# Patient Record
Sex: MALE | Race: WHITE | Employment: OTHER | URBAN - METROPOLITAN AREA
[De-identification: names, ages, dates, MRNs, and addresses within clinical notes are randomized per-mention and may not be internally consistent; named-entity substitution may affect disease eponyms.]

---

## 2017-01-30 LAB — INR BLD: 2.6

## 2017-01-31 ENCOUNTER — ANTI-COAG VISIT (OUTPATIENT)
Dept: CARDIOLOGY | Age: 82
End: 2017-01-31

## 2017-01-31 DIAGNOSIS — I48.92 ATRIAL FLUTTER, UNSPECIFIED TYPE (HCC): ICD-10-CM

## 2017-01-31 DIAGNOSIS — I48.0 PAROXYSMAL ATRIAL FIBRILLATION (HCC): ICD-10-CM

## 2017-02-08 DIAGNOSIS — I48.20 CHRONIC ATRIAL FIBRILLATION (HCC): ICD-10-CM

## 2017-02-08 RX ORDER — WARFARIN SODIUM 2 MG/1
TABLET ORAL
Qty: 90 TABLET | Refills: 3 | Status: SHIPPED | OUTPATIENT
Start: 2017-02-08 | End: 2018-07-11 | Stop reason: SDUPTHER

## 2017-02-08 RX ORDER — DILTIAZEM HYDROCHLORIDE 300 MG/1
CAPSULE, EXTENDED RELEASE ORAL
Qty: 90 CAPSULE | Refills: 3 | Status: SHIPPED | OUTPATIENT
Start: 2017-02-08 | End: 2018-02-13 | Stop reason: SDUPTHER

## 2017-03-02 LAB — INR BLD: 2.3

## 2017-03-03 ENCOUNTER — ANTI-COAG VISIT (OUTPATIENT)
Dept: CARDIOLOGY | Age: 82
End: 2017-03-03

## 2017-03-03 DIAGNOSIS — I48.92 ATRIAL FLUTTER, UNSPECIFIED TYPE (HCC): ICD-10-CM

## 2017-03-03 DIAGNOSIS — I48.0 PAROXYSMAL ATRIAL FIBRILLATION (HCC): ICD-10-CM

## 2017-04-04 LAB — INR BLD: 2.5

## 2017-04-05 ENCOUNTER — ANTI-COAG VISIT (OUTPATIENT)
Dept: CARDIOLOGY | Age: 82
End: 2017-04-05

## 2017-04-05 DIAGNOSIS — I48.92 ATRIAL FLUTTER, UNSPECIFIED TYPE (HCC): ICD-10-CM

## 2017-04-05 DIAGNOSIS — I48.0 PAROXYSMAL ATRIAL FIBRILLATION (HCC): ICD-10-CM

## 2017-05-02 ENCOUNTER — ANTI-COAG VISIT (OUTPATIENT)
Dept: CARDIOLOGY | Age: 82
End: 2017-05-02

## 2017-05-02 DIAGNOSIS — I48.0 PAROXYSMAL ATRIAL FIBRILLATION (HCC): ICD-10-CM

## 2017-05-02 DIAGNOSIS — I48.92 ATRIAL FLUTTER, UNSPECIFIED TYPE (HCC): ICD-10-CM

## 2017-05-31 ENCOUNTER — ANTI-COAG VISIT (OUTPATIENT)
Dept: CARDIOLOGY CLINIC | Age: 82
End: 2017-05-31

## 2017-05-31 DIAGNOSIS — I48.0 PAROXYSMAL ATRIAL FIBRILLATION (HCC): ICD-10-CM

## 2017-05-31 DIAGNOSIS — I48.92 ATRIAL FLUTTER, UNSPECIFIED TYPE (HCC): ICD-10-CM

## 2017-06-14 ENCOUNTER — OFFICE VISIT (OUTPATIENT)
Dept: CARDIOLOGY CLINIC | Age: 82
End: 2017-06-14

## 2017-06-14 VITALS — WEIGHT: 204.4 LBS | DIASTOLIC BLOOD PRESSURE: 60 MMHG | SYSTOLIC BLOOD PRESSURE: 150 MMHG | HEART RATE: 62 BPM

## 2017-06-14 DIAGNOSIS — I48.0 PAROXYSMAL ATRIAL FIBRILLATION (HCC): Primary | ICD-10-CM

## 2017-06-14 PROCEDURE — 93000 ELECTROCARDIOGRAM COMPLETE: CPT | Performed by: INTERNAL MEDICINE

## 2017-06-14 PROCEDURE — 4040F PNEUMOC VAC/ADMIN/RCVD: CPT | Performed by: INTERNAL MEDICINE

## 2017-06-14 PROCEDURE — 99213 OFFICE O/P EST LOW 20 MIN: CPT | Performed by: INTERNAL MEDICINE

## 2017-06-14 PROCEDURE — 1036F TOBACCO NON-USER: CPT | Performed by: INTERNAL MEDICINE

## 2017-06-14 PROCEDURE — 1123F ACP DISCUSS/DSCN MKR DOCD: CPT | Performed by: INTERNAL MEDICINE

## 2017-06-14 PROCEDURE — G8421 BMI NOT CALCULATED: HCPCS | Performed by: INTERNAL MEDICINE

## 2017-06-14 PROCEDURE — G8428 CUR MEDS NOT DOCUMENT: HCPCS | Performed by: INTERNAL MEDICINE

## 2017-07-05 LAB
INR BLD: 2.4 (ref 0.9–1.1)
PROTHROMBIN TIME: 26.9 SECONDS (ref 11.6–14.4)

## 2017-07-06 ENCOUNTER — ANTI-COAG VISIT (OUTPATIENT)
Dept: CARDIOLOGY CLINIC | Age: 82
End: 2017-07-06

## 2017-07-06 DIAGNOSIS — I48.3 TYPICAL ATRIAL FLUTTER (HCC): ICD-10-CM

## 2017-07-06 DIAGNOSIS — I48.0 PAROXYSMAL ATRIAL FIBRILLATION (HCC): ICD-10-CM

## 2017-07-06 RX ORDER — WARFARIN SODIUM 5 MG/1
TABLET ORAL
Qty: 90 TABLET | Refills: 2 | Status: SHIPPED | OUTPATIENT
Start: 2017-07-06 | End: 2018-04-24 | Stop reason: SDUPTHER

## 2017-08-01 LAB
INR BLD: 2.1 (ref 0.9–1.1)
PROTHROMBIN TIME: 24.4 SECONDS (ref 11.6–14.4)

## 2017-08-02 ENCOUNTER — ANTI-COAG VISIT (OUTPATIENT)
Dept: CARDIOLOGY CLINIC | Age: 82
End: 2017-08-02

## 2017-08-02 DIAGNOSIS — I48.0 PAROXYSMAL ATRIAL FIBRILLATION (HCC): ICD-10-CM

## 2017-08-02 DIAGNOSIS — I48.3 TYPICAL ATRIAL FLUTTER (HCC): ICD-10-CM

## 2017-08-31 ENCOUNTER — ANTI-COAG VISIT (OUTPATIENT)
Dept: CARDIOLOGY CLINIC | Age: 82
End: 2017-08-31

## 2017-08-31 DIAGNOSIS — I48.3 TYPICAL ATRIAL FLUTTER (HCC): ICD-10-CM

## 2017-08-31 DIAGNOSIS — I48.0 PAROXYSMAL ATRIAL FIBRILLATION (HCC): ICD-10-CM

## 2017-10-04 ENCOUNTER — ANTI-COAG VISIT (OUTPATIENT)
Dept: CARDIOLOGY CLINIC | Age: 82
End: 2017-10-04

## 2017-10-04 DIAGNOSIS — I48.3 TYPICAL ATRIAL FLUTTER (HCC): ICD-10-CM

## 2017-10-04 DIAGNOSIS — I48.0 PAROXYSMAL ATRIAL FIBRILLATION (HCC): ICD-10-CM

## 2017-10-04 LAB — INR BLD: 2.1

## 2017-10-04 NOTE — PATIENT INSTRUCTIONS
Old Dose: take 6 mg monday. Take 5 mg all other days.   New Dose: no change  Re-check in 1 month    Spoke to pt, verbalized understanding

## 2017-11-02 ENCOUNTER — ANTI-COAG VISIT (OUTPATIENT)
Dept: CARDIOLOGY CLINIC | Age: 82
End: 2017-11-02

## 2017-11-02 DIAGNOSIS — I48.3 TYPICAL ATRIAL FLUTTER (HCC): ICD-10-CM

## 2017-11-02 DIAGNOSIS — I48.0 PAROXYSMAL ATRIAL FIBRILLATION (HCC): ICD-10-CM

## 2017-11-02 NOTE — PATIENT INSTRUCTIONS
Old Dose: take 6 mg monday. Take 5 mg all other days.   New Dose: 6 mg   Re-check in 1 week    Left message for pt to call back

## 2017-11-10 ENCOUNTER — ANTI-COAG VISIT (OUTPATIENT)
Dept: CARDIOLOGY CLINIC | Age: 82
End: 2017-11-10

## 2017-11-10 DIAGNOSIS — I48.3 TYPICAL ATRIAL FLUTTER (HCC): ICD-10-CM

## 2017-11-10 DIAGNOSIS — I48.0 PAROXYSMAL ATRIAL FIBRILLATION (HCC): ICD-10-CM

## 2017-11-10 NOTE — PATIENT INSTRUCTIONS
Old Dose:  take 6 mg Monday and thursday 5 mg other days  New Dose:no change   Re-check in 2 weeks  attempted to call pt x3 No answer

## 2017-12-05 DIAGNOSIS — Z79.01 ANTICOAGULATED: Primary | ICD-10-CM

## 2017-12-07 ENCOUNTER — TELEPHONE (OUTPATIENT)
Dept: CARDIOLOGY CLINIC | Age: 82
End: 2017-12-07

## 2017-12-07 ENCOUNTER — ANTI-COAG VISIT (OUTPATIENT)
Dept: CARDIOLOGY CLINIC | Age: 82
End: 2017-12-07

## 2017-12-07 DIAGNOSIS — I48.0 PAROXYSMAL ATRIAL FIBRILLATION (HCC): ICD-10-CM

## 2017-12-07 DIAGNOSIS — I48.3 TYPICAL ATRIAL FLUTTER (HCC): ICD-10-CM

## 2017-12-07 LAB — INR BLD: 2.5

## 2017-12-07 NOTE — TELEPHONE ENCOUNTER
Pt called asking why he has not received a call for his INR. Informed pt three calls where made by this nurse on 11/10/17 and there was no answer. Pt then stated he had his INR drawn on 12/5/17. Informed pt there is not any results in epic. Pt stated that he went to [facility] lab. This nurse asked pt to call the lab and ask for the results to be refaxed and also offered to call the lab for him. Pt stated that he will go down to the lab office and have someone call us. Spoke to Omar Mejia at [facility] lab and asked her to re fax lab results. received fax from [facility] lab and spoke to pt about INR level. Pt stated that the lab did not have his information in the system. Informed pt to call the office in 2 days after blood test if he has not herd from us. Pt verbalized understanding and satisfied.

## 2018-01-04 ENCOUNTER — ANTI-COAG VISIT (OUTPATIENT)
Dept: CARDIOLOGY CLINIC | Age: 83
End: 2018-01-04

## 2018-01-04 DIAGNOSIS — I48.0 PAROXYSMAL ATRIAL FIBRILLATION (HCC): ICD-10-CM

## 2018-01-04 DIAGNOSIS — I48.3 TYPICAL ATRIAL FLUTTER (HCC): ICD-10-CM

## 2018-01-04 LAB — INR BLD: 2.2

## 2018-01-04 NOTE — PATIENT INSTRUCTIONS
Old Dose:  take 6 mg Monday, 5 mg other days  New Dose:no change   Re-check in 1 month  Spoke to pt he verbalized understanding

## 2018-01-10 ENCOUNTER — OFFICE VISIT (OUTPATIENT)
Dept: CARDIOLOGY CLINIC | Age: 83
End: 2018-01-10

## 2018-01-10 VITALS — SYSTOLIC BLOOD PRESSURE: 148 MMHG | HEART RATE: 86 BPM | DIASTOLIC BLOOD PRESSURE: 90 MMHG | WEIGHT: 205.2 LBS

## 2018-01-10 DIAGNOSIS — I10 ESSENTIAL HYPERTENSION, BENIGN: ICD-10-CM

## 2018-01-10 DIAGNOSIS — I48.0 PAROXYSMAL ATRIAL FIBRILLATION (HCC): Primary | ICD-10-CM

## 2018-01-10 PROCEDURE — 4040F PNEUMOC VAC/ADMIN/RCVD: CPT | Performed by: NURSE PRACTITIONER

## 2018-01-10 PROCEDURE — 99213 OFFICE O/P EST LOW 20 MIN: CPT | Performed by: NURSE PRACTITIONER

## 2018-01-10 PROCEDURE — G8421 BMI NOT CALCULATED: HCPCS | Performed by: NURSE PRACTITIONER

## 2018-01-10 PROCEDURE — 1123F ACP DISCUSS/DSCN MKR DOCD: CPT | Performed by: NURSE PRACTITIONER

## 2018-01-10 PROCEDURE — 1036F TOBACCO NON-USER: CPT | Performed by: NURSE PRACTITIONER

## 2018-01-10 PROCEDURE — G8427 DOCREV CUR MEDS BY ELIG CLIN: HCPCS | Performed by: NURSE PRACTITIONER

## 2018-01-10 PROCEDURE — G8484 FLU IMMUNIZE NO ADMIN: HCPCS | Performed by: NURSE PRACTITIONER

## 2018-01-10 ASSESSMENT — ENCOUNTER SYMPTOMS
RESPIRATORY NEGATIVE: 1
GASTROINTESTINAL NEGATIVE: 1

## 2018-01-31 ENCOUNTER — ANTI-COAG VISIT (OUTPATIENT)
Dept: CARDIOLOGY CLINIC | Age: 83
End: 2018-01-31

## 2018-01-31 DIAGNOSIS — I48.3 TYPICAL ATRIAL FLUTTER (HCC): ICD-10-CM

## 2018-01-31 DIAGNOSIS — I48.0 PAROXYSMAL ATRIAL FIBRILLATION (HCC): ICD-10-CM

## 2018-01-31 LAB — INR BLD: 2.3

## 2018-01-31 NOTE — PATIENT INSTRUCTIONS
Old Dose:  take 7 mg Monday, 5 mg other days  New Dose:no change   Re-check in 1 month  Spoke to pt he verbalized understanding

## 2018-02-13 DIAGNOSIS — I48.20 CHRONIC ATRIAL FIBRILLATION (HCC): ICD-10-CM

## 2018-02-13 RX ORDER — DILTIAZEM HYDROCHLORIDE 300 MG/1
CAPSULE, EXTENDED RELEASE ORAL
Qty: 90 CAPSULE | Refills: 3 | Status: SHIPPED | OUTPATIENT
Start: 2018-02-13 | End: 2018-10-30 | Stop reason: SDUPTHER

## 2018-03-06 LAB — INR BLD: 2.3

## 2018-03-07 ENCOUNTER — ANTI-COAG VISIT (OUTPATIENT)
Dept: CARDIOLOGY CLINIC | Age: 83
End: 2018-03-07

## 2018-03-07 DIAGNOSIS — I48.3 TYPICAL ATRIAL FLUTTER (HCC): ICD-10-CM

## 2018-03-07 DIAGNOSIS — I48.0 PAROXYSMAL ATRIAL FIBRILLATION (HCC): ICD-10-CM

## 2018-03-07 NOTE — PATIENT INSTRUCTIONS
Old Dose:  take 7 mg Monday, 5 mg other days  New Dose: No change   Re-check in 1 month  Spoke to pt he verbalized understanding

## 2018-04-25 RX ORDER — WARFARIN SODIUM 5 MG/1
TABLET ORAL
Qty: 90 TABLET | Refills: 1 | Status: SHIPPED | OUTPATIENT
Start: 2018-04-25 | End: 2018-07-11 | Stop reason: SDUPTHER

## 2018-05-01 LAB — INR BLD: 2.6

## 2018-05-02 ENCOUNTER — ANTI-COAG VISIT (OUTPATIENT)
Dept: CARDIOLOGY CLINIC | Age: 83
End: 2018-05-02

## 2018-05-02 DIAGNOSIS — I48.0 PAROXYSMAL ATRIAL FIBRILLATION (HCC): ICD-10-CM

## 2018-05-02 DIAGNOSIS — I48.3 TYPICAL ATRIAL FLUTTER (HCC): ICD-10-CM

## 2018-06-06 ENCOUNTER — ANTI-COAG VISIT (OUTPATIENT)
Dept: CARDIOLOGY CLINIC | Age: 83
End: 2018-06-06

## 2018-06-06 DIAGNOSIS — I48.3 TYPICAL ATRIAL FLUTTER (HCC): ICD-10-CM

## 2018-06-06 DIAGNOSIS — I48.0 PAROXYSMAL ATRIAL FIBRILLATION (HCC): ICD-10-CM

## 2018-06-06 LAB — INR BLD: 2.5

## 2018-07-11 ENCOUNTER — OFFICE VISIT (OUTPATIENT)
Dept: CARDIOLOGY CLINIC | Age: 83
End: 2018-07-11

## 2018-07-11 VITALS — WEIGHT: 208 LBS | DIASTOLIC BLOOD PRESSURE: 70 MMHG | SYSTOLIC BLOOD PRESSURE: 124 MMHG | HEART RATE: 64 BPM

## 2018-07-11 DIAGNOSIS — I10 ESSENTIAL HYPERTENSION, BENIGN: Primary | ICD-10-CM

## 2018-07-11 DIAGNOSIS — I48.0 PAROXYSMAL ATRIAL FIBRILLATION (HCC): ICD-10-CM

## 2018-07-11 PROCEDURE — 99213 OFFICE O/P EST LOW 20 MIN: CPT | Performed by: INTERNAL MEDICINE

## 2018-07-11 PROCEDURE — 93000 ELECTROCARDIOGRAM COMPLETE: CPT | Performed by: INTERNAL MEDICINE

## 2018-07-11 RX ORDER — WARFARIN SODIUM 2 MG/1
TABLET ORAL
Qty: 12 TABLET | Refills: 3 | Status: SHIPPED | OUTPATIENT
Start: 2018-07-11 | End: 2018-07-12 | Stop reason: SDUPTHER

## 2018-07-11 RX ORDER — WARFARIN SODIUM 5 MG/1
5 TABLET ORAL DAILY
Qty: 90 TABLET | Refills: 3 | Status: SHIPPED | OUTPATIENT
Start: 2018-07-11 | End: 2019-02-04 | Stop reason: SDUPTHER

## 2018-07-11 NOTE — PROGRESS NOTES
Subjective:      Patient ID: Kahlil Rosas is a 80 y.o. male. CC:  Follow up paroxysmal AF    HPI Mr. Laura Chapa is here for a 4 month review with history of AF. No CP. No near syncope. No palps and no chest pains. hasnt had a headache in \"years\". No Known Allergies     Social History     Social History    Marital status:      Spouse name: N/A    Number of children: N/A    Years of education: N/A     Occupational History    Not on file. Social History Main Topics    Smoking status: Never Smoker    Smokeless tobacco: Never Used    Alcohol use No    Drug use: No    Sexual activity: Not on file      Comment:  living with spouse     Other Topics Concern    Not on file     Social History Narrative    No narrative on file        Patient has a family history is not on file. Patient  has a past medical history of Hypertension and PAF (paroxysmal atrial fibrillation) ([facility] [address] Current Outpatient Prescriptions   Medication Sig Dispense Refill    warfarin (COUMADIN) 5 MG tablet TAKE ONE TABLET BY MOUTH DAILY FOR SIX DAYS PER WEEK AND 4MG ONE DAY PER WEEK OR AS DIRECTED BY THE PHYSICIAN 90 tablet 1    CARTIA  MG extended release capsule TAKE ONE CAPSULE BY MOUTH DAILY 90 capsule 3    warfarin (COUMADIN) 2 MG tablet Three tablets once day per week 90 tablet 3     No current facility-administered medications for this visit. Vitals  Weight: 208 lb (94.3 kg)  Blood Pressure:  164/98  160/98   Pulse: 64       Review of Systems   Constitutional: Negative. HENT: Negative. Eyes: Negative. Respiratory: Negative. Cardiovascular: Negative. Gastrointestinal: Negative. Genitourinary: Negative. Musculoskeletal: Negative. Neurological: Negative. Hematological: Negative. Psychiatric/Behavioral: Negative. Objective:   Physical Exam   Nursing note and vitals reviewed. Constitutional: He is oriented to person, place, and time.  He appears well-developed and well-nourished. No distress. HENT:   Head: Normocephalic and atraumatic. Nose: Nose normal.   Mouth/Throat: Oropharynx is clear and moist. No oropharyngeal exudate. Eyes: Conjunctivae and EOM are normal. Pupils are equal, round, and reactive to light. Right eye exhibits no discharge. Left eye exhibits no discharge. No scleral icterus. Neck: Normal range of motion. Neck supple. No JVD present. No tracheal deviation present. No thyromegaly present. Cardiovascular: Normal rate, regular rhythm, normal heart sounds and intact distal pulses. Exam reveals no gallop and no friction rub. II/VI HSM and ESM   No murmur heard. Pulmonary/Chest: Effort normal. No respiratory distress. He has no wheezes. He has no rales. He exhibits no tenderness. Abdominal: Soft. Bowel sounds are normal. He exhibits no distension and no mass. No tenderness. He has no rebound and no guarding. Musculoskeletal: He exhibits no edema and no tenderness. Lymphadenopathy:     He has no cervical adenopathy. Neurological: He is alert and oriented to person, place, and time. No cranial nerve deficit. Coordination normal.   Skin: Skin is warm and dry. No rash noted. He is not diaphoretic. No erythema. Psychiatric: He has a normal mood and affect. His behavior is normal. Judgment and thought content normal.       Assessment:      Patient Active Problem List   Diagnosis    Atrial fibrillation (HCC)    Atrial flutter (HCC)    Essential hypertension, benign           Plan:      Continue Cartia to 300 mg daily. AF is controlled and is on warfarin. Continue warfarin. He is SR today on ECG with RBBB and unchanged with 1 pVC. Luke Terrell

## 2018-07-12 DIAGNOSIS — I48.0 PAROXYSMAL ATRIAL FIBRILLATION (HCC): Primary | ICD-10-CM

## 2018-07-12 RX ORDER — WARFARIN SODIUM 2 MG/1
TABLET ORAL
Qty: 30 TABLET | Refills: 3 | Status: SHIPPED | OUTPATIENT
Start: 2018-07-12 | End: 2019-02-04 | Stop reason: SDUPTHER

## 2018-08-02 ENCOUNTER — TELEPHONE (OUTPATIENT)
Dept: CARDIOLOGY CLINIC | Age: 83
End: 2018-08-02

## 2018-08-02 NOTE — TELEPHONE ENCOUNTER
is upset that he has to call us to get his protime. I explained to him that we can only call him once we receive his Pro time from . He stated that for two months now it has been a delay in him getting his results. Please look into this for .

## 2018-09-05 DIAGNOSIS — I48.0 PAROXYSMAL ATRIAL FIBRILLATION (HCC): Primary | ICD-10-CM

## 2018-09-05 DIAGNOSIS — I48.3 TYPICAL ATRIAL FLUTTER (HCC): ICD-10-CM

## 2018-09-05 LAB
INR BLD: 2.36
INR BLD: 2.36 (ref 0.86–1.14)
PROTHROMBIN TIME: 26.9 SEC (ref 9.8–13)

## 2018-09-06 ENCOUNTER — ANTI-COAG VISIT (OUTPATIENT)
Dept: CARDIOLOGY CLINIC | Age: 83
End: 2018-09-06

## 2018-09-06 DIAGNOSIS — I48.3 TYPICAL ATRIAL FLUTTER (HCC): ICD-10-CM

## 2018-09-06 DIAGNOSIS — I48.0 PAROXYSMAL ATRIAL FIBRILLATION (HCC): ICD-10-CM

## 2018-10-02 DIAGNOSIS — Z79.01 ANTICOAGULATED: ICD-10-CM

## 2018-10-02 LAB
INR BLD: 2.4 (ref 0.86–1.14)
PROTHROMBIN TIME: 27.4 SEC (ref 9.8–13)

## 2018-10-03 ENCOUNTER — ANTI-COAG VISIT (OUTPATIENT)
Dept: CARDIOLOGY CLINIC | Age: 83
End: 2018-10-03
Payer: MEDICARE

## 2018-10-03 DIAGNOSIS — I48.92 ATRIAL FLUTTER, UNSPECIFIED TYPE (HCC): ICD-10-CM

## 2018-10-03 DIAGNOSIS — I48.91 ATRIAL FIBRILLATION, UNSPECIFIED TYPE (HCC): ICD-10-CM

## 2018-10-03 PROCEDURE — 93793 ANTICOAG MGMT PT WARFARIN: CPT | Performed by: NURSE PRACTITIONER

## 2018-10-24 ENCOUNTER — TELEPHONE (OUTPATIENT)
Dept: CARDIOLOGY CLINIC | Age: 83
End: 2018-10-24

## 2018-10-25 ENCOUNTER — TELEPHONE (OUTPATIENT)
Dept: PHARMACY | Age: 83
End: 2018-10-25

## 2018-10-25 ENCOUNTER — TELEPHONE (OUTPATIENT)
Dept: CARDIOLOGY CLINIC | Age: 83
End: 2018-10-25

## 2018-10-25 DIAGNOSIS — I48.91 ATRIAL FIBRILLATION, UNSPECIFIED TYPE (HCC): Primary | ICD-10-CM

## 2018-10-25 NOTE — TELEPHONE ENCOUNTER
Received e-referral from Dr. Fouzia Carrasquillo to manage warfarin therapy. Attempted to call patient's daughter To Donovan) in order to schedule first appointment. Left a voicemail with instructions for Valentina to contact the clinic in order to schedule an appointment. Note: patient was recently diagnosed with left central retinal artery occlusion. Lipitor and ASA 81 mg daily were initiated for stroke prevention. Patient was also prescribed prednisone, which will likely elevate INR. Patient has lost vision in his left eye, so Laura Chavez has been helping with medical care.       Adonis Denny, PharmD  [facility]   [facility] Ph: [phone]  [facility] Ph: [phone]  10/25/2018 1:22 PM

## 2018-10-30 ENCOUNTER — OFFICE VISIT (OUTPATIENT)
Dept: CARDIOLOGY CLINIC | Age: 83
End: 2018-10-30
Payer: MEDICARE

## 2018-10-30 ENCOUNTER — ANTI-COAG VISIT (OUTPATIENT)
Dept: CARDIOLOGY CLINIC | Age: 83
End: 2018-10-30
Payer: MEDICARE

## 2018-10-30 VITALS
OXYGEN SATURATION: 96 % | BODY MASS INDEX: 28.78 KG/M2 | DIASTOLIC BLOOD PRESSURE: 70 MMHG | HEART RATE: 78 BPM | SYSTOLIC BLOOD PRESSURE: 110 MMHG | HEIGHT: 71 IN | WEIGHT: 205.6 LBS

## 2018-10-30 DIAGNOSIS — I48.0 PAROXYSMAL ATRIAL FIBRILLATION (HCC): ICD-10-CM

## 2018-10-30 DIAGNOSIS — I48.92 ATRIAL FLUTTER, UNSPECIFIED TYPE (HCC): ICD-10-CM

## 2018-10-30 DIAGNOSIS — I48.91 ATRIAL FIBRILLATION, UNSPECIFIED TYPE (HCC): ICD-10-CM

## 2018-10-30 DIAGNOSIS — I48.20 CHRONIC ATRIAL FIBRILLATION (HCC): ICD-10-CM

## 2018-10-30 DIAGNOSIS — I48.3 TYPICAL ATRIAL FLUTTER (HCC): ICD-10-CM

## 2018-10-30 DIAGNOSIS — I48.91 ATRIAL FIBRILLATION, UNSPECIFIED TYPE (HCC): Primary | ICD-10-CM

## 2018-10-30 LAB
INR BLD: 2.75 (ref 0.86–1.14)
PROTHROMBIN TIME: 31.3 SEC (ref 9.8–13)

## 2018-10-30 PROCEDURE — 93793 ANTICOAG MGMT PT WARFARIN: CPT | Performed by: NURSE PRACTITIONER

## 2018-10-30 PROCEDURE — 99214 OFFICE O/P EST MOD 30 MIN: CPT | Performed by: INTERNAL MEDICINE

## 2018-10-30 PROCEDURE — 93000 ELECTROCARDIOGRAM COMPLETE: CPT | Performed by: INTERNAL MEDICINE

## 2018-10-30 RX ORDER — PREDNISONE 20 MG/1
20 TABLET ORAL DAILY
COMMUNITY
End: 2018-12-26 | Stop reason: CLARIF

## 2018-10-30 RX ORDER — DILTIAZEM HYDROCHLORIDE 300 MG/1
CAPSULE, COATED, EXTENDED RELEASE ORAL
Qty: 90 CAPSULE | Refills: 3 | Status: SHIPPED | OUTPATIENT
Start: 2018-10-30 | End: 2019-11-17 | Stop reason: SDUPTHER

## 2018-10-30 RX ORDER — ATORVASTATIN CALCIUM 80 MG/1
80 TABLET, FILM COATED ORAL DAILY
COMMUNITY
End: 2018-11-16 | Stop reason: SDUPTHER

## 2018-10-30 NOTE — TELEPHONE ENCOUNTER
Spoke to patient- he feels that [place] is too far away and he relies on his daughter for transportation. He will discuss plans for INR monitoring with cardiologist today at [facility].     Popeye Trujillo, PharmD, [facility]  Medication Management Clinic   [facility] Ph: [phone]  Rosanna Gerardo Ph: [phone]  10/30/2018 9:46 AM

## 2018-11-02 ENCOUNTER — TELEPHONE (OUTPATIENT)
Dept: CARDIOLOGY CLINIC | Age: 83
End: 2018-11-02

## 2018-11-08 ENCOUNTER — ANTI-COAG VISIT (OUTPATIENT)
Dept: PHARMACY | Age: 83
End: 2018-11-08
Payer: MEDICARE

## 2018-11-08 DIAGNOSIS — I48.91 ATRIAL FIBRILLATION, UNSPECIFIED TYPE (HCC): ICD-10-CM

## 2018-11-08 DIAGNOSIS — I48.92 ATRIAL FLUTTER, UNSPECIFIED TYPE (HCC): ICD-10-CM

## 2018-11-08 LAB — INTERNATIONAL NORMALIZATION RATIO, POC: 3.5

## 2018-11-08 PROCEDURE — 99213 OFFICE O/P EST LOW 20 MIN: CPT

## 2018-11-08 PROCEDURE — 85610 PROTHROMBIN TIME: CPT

## 2018-11-08 NOTE — PROGRESS NOTES
ANTICOAGULATION SERVICE    Ismael Billingsley is a 80 y.o. male with PMHx significant for A-fib/A-flutter, HTN, recent left central retinal artery occlusion who presents to clinic 11/8/2018 for anticoagulation monitoring and adjustment. Patient has been taking warfarin for a couple of years.     Anticoagulation Indication(s):  Afib, Aflutter    Referring Physician:  Dr. Bea Mosley  Goal INR Range:  2-3  Duration of Anticoagulation Therapy:  Indefinite  Time of day dose taken:  PM  Product patient has at home:  warfarin 5 mg (peach), 2 mg (lavender)    Recent INR Results:  Lab Results   Component Value Date    INR 2.75 (H) 10/30/2018    INR 2.40 (H) 10/02/2018    INR 2.36 (H) 09/05/2018    INR 2.36 09/05/2018       INR Summary                             Warfarin regimen (mg)  Date INR   A/P     Sun Mon Tue Wed Thu Fri Sat Mg/wk  11/8 3.5 Above goal, decrease   5 5 5 5 2.5/5 5 5 35  10/30 2.75 Per cardio office, continue  5 7 5 5 5 5 5 37    Last CBC:  Lab Results   Component Value Date    RBC 5.16 02/12/2014    HGB 16.2 02/12/2014    HCT 47.7 02/12/2014    MCV 92.5 02/12/2014    MCH 31.5 02/12/2014    MPV 10.4 02/12/2014    RDW 14.0 02/12/2014     02/12/2014       Patient History:  Recent hospitalizations/HC visits -11/8 rheumatologist: patient needs temporal artery biopsy to r/o temporal arteritis   -10/30 Dr. Bea Mosley: no med changes  -10/19 [facility] AT [facility] ED: diagnosed with left central retinal artery occlusion   Recent medication changes -Prednisone 60 mg daily started 10/23  -Started on ASA + Lipitor for central retinal artery occlusion on 10/19   Medications taken regularly that may interact with warfarin or alter INR ASA 81 mg   Warfarin dose taken as prescribed Yes, does not use pillbox, but has own system of taking medications that works very well   Signs/symptoms of bleeding No h/o bleeding reported   Vitamin K intake Normally has ~0 servings of green, leafy vegetables per week   Recent vomiting/diarrhea/fever,

## 2018-11-16 ENCOUNTER — TELEPHONE (OUTPATIENT)
Dept: CARDIOLOGY CLINIC | Age: 83
End: 2018-11-16

## 2018-11-16 RX ORDER — ATORVASTATIN CALCIUM 80 MG/1
80 TABLET, FILM COATED ORAL DAILY
Qty: 30 TABLET | Refills: 5 | Status: ON HOLD | OUTPATIENT
Start: 2018-11-16 | End: 2019-02-24 | Stop reason: CLARIF

## 2018-11-19 ENCOUNTER — ANTI-COAG VISIT (OUTPATIENT)
Dept: PHARMACY | Age: 83
End: 2018-11-19
Payer: MEDICARE

## 2018-11-19 DIAGNOSIS — I48.91 ATRIAL FIBRILLATION, UNSPECIFIED TYPE (HCC): ICD-10-CM

## 2018-11-19 DIAGNOSIS — I48.92 ATRIAL FLUTTER, UNSPECIFIED TYPE (HCC): ICD-10-CM

## 2018-11-19 LAB — INTERNATIONAL NORMALIZATION RATIO, POC: 2.7

## 2018-11-19 PROCEDURE — 85610 PROTHROMBIN TIME: CPT

## 2018-11-19 PROCEDURE — 99211 OFF/OP EST MAY X REQ PHY/QHP: CPT

## 2018-11-19 NOTE — PROGRESS NOTES
ANTICOAGULATION SERVICE    Shellie Valentino is a 80 y.o. male with PMHx significant for A-fib/A-flutter, HTN, recent left central retinal artery occlusion who presents to clinic 11/19/2018 for anticoagulation monitoring and adjustment. Patient has been taking warfarin for a couple of years.     Anticoagulation Indication(s):  Afib, Aflutter    Referring Physician:  Dr. Adan Fields  Goal INR Range:  2-3  Duration of Anticoagulation Therapy:  Indefinite  Time of day dose taken:  PM  Product patient has at home:  warfarin 5 mg (peach), 2 mg (lavender)    Recent INR Results:  Lab Results   Component Value Date    INR 3.5 11/08/2018    INR 2.75 (H) 10/30/2018    INR 2.40 (H) 10/02/2018    INR 2.36 (H) 09/05/2018       INR Summary                             Warfarin regimen (mg)  Date INR   A/P     Sun Mon Tue Wed Thu Fri Sat Mg/wk  11/19 2.7 At goal, no change   5 5 5 5 5 5 5 35  11/8 3.5 Above goal, decrease   5 5 5 5 2.5/5 5 5 35  10/30 2.75 Per cardio office, continue  5 7 5 5 5 5 5 37    Last CBC:  Lab Results   Component Value Date    RBC 5.16 02/12/2014    HGB 16.2 02/12/2014    HCT 47.7 02/12/2014    MCV 92.5 02/12/2014    MCH 31.5 02/12/2014    MPV 10.4 02/12/2014    RDW 14.0 02/12/2014     02/12/2014       Patient History:  Recent hospitalizations/HC visits -11/8 rheumatologist: patient needs temporal artery biopsy to r/o temporal arteritis   -10/30 Dr. Adan Fields: no med changes  -10/19 [facility] AT [facility] ED: diagnosed with left central retinal artery occlusion   Recent medication changes -Prednisone 60 mg daily started 10/23  -Started on ASA + Lipitor for central retinal artery occlusion on 10/19   Medications taken regularly that may interact with warfarin or alter INR ASA 81 mg   Warfarin dose taken as prescribed Yes, does not use pillbox, but has own system of taking medications that works very well   Signs/symptoms of bleeding No h/o bleeding reported   Vitamin K intake Normally has ~0 servings of green, leafy vegetables per week   Recent vomiting/diarrhea/fever, changes in weight or activity level None reported   Tobacco or alcohol use Patient quit smoking in 1955  Patient quit drinking alcohol at least 5 years ago   Upcoming surgeries or procedures Temporal artery biopsy 11/26 at [facility]: patient will hold warfarin 11/21-11/26 and resume on 11/27     Assessment/Plan:  Patient's INR was therapeutic today (2.7) following warfarin dose reduction at last visit. Patient was instructed to continue warfarin 5 mg daily, then hold 11/21-11/26 for temporal artery biopsy on 11/26. Repeat INR 1 week after procedure. Patient was reminded to maintain consistent vitamin K intake and call with any bleeding, medication changes, or fever/vomiting/diarrhea. Patient understands dosing directions and information discussed. Dosing schedule and follow up appointment given to patient. Progress note routed to referring physician's office. Patient acknowledges working in consult agreement with pharmacist as referred by his/her physician. Next Clinic Appointment:  12/6    Please call [facility] at [phone] with any questions. Thanks! Flor Toure.  Ag Bolton, PharmD  [facility]  Ph: [phone]  11/19/2018 9:53 AM

## 2018-12-06 ENCOUNTER — ANTI-COAG VISIT (OUTPATIENT)
Dept: PHARMACY | Age: 83
End: 2018-12-06
Payer: MEDICARE

## 2018-12-06 DIAGNOSIS — I48.92 ATRIAL FLUTTER, UNSPECIFIED TYPE (HCC): ICD-10-CM

## 2018-12-06 DIAGNOSIS — I48.91 ATRIAL FIBRILLATION, UNSPECIFIED TYPE (HCC): ICD-10-CM

## 2018-12-06 LAB — INTERNATIONAL NORMALIZATION RATIO, POC: 2.8

## 2018-12-06 PROCEDURE — 99211 OFF/OP EST MAY X REQ PHY/QHP: CPT

## 2018-12-06 PROCEDURE — 85610 PROTHROMBIN TIME: CPT

## 2018-12-07 ENCOUNTER — TELEPHONE (OUTPATIENT)
Dept: CARDIOLOGY CLINIC | Age: 83
End: 2018-12-07

## 2018-12-21 ENCOUNTER — ANTI-COAG VISIT (OUTPATIENT)
Dept: PHARMACY | Age: 83
End: 2018-12-21
Payer: MEDICARE

## 2018-12-21 DIAGNOSIS — I48.91 ATRIAL FIBRILLATION, UNSPECIFIED TYPE (HCC): ICD-10-CM

## 2018-12-21 DIAGNOSIS — I48.92 ATRIAL FLUTTER, UNSPECIFIED TYPE (HCC): ICD-10-CM

## 2018-12-21 LAB — INTERNATIONAL NORMALIZATION RATIO, POC: 3.6

## 2018-12-21 PROCEDURE — 99211 OFF/OP EST MAY X REQ PHY/QHP: CPT

## 2018-12-21 PROCEDURE — 85610 PROTHROMBIN TIME: CPT

## 2018-12-26 ENCOUNTER — TELEPHONE (OUTPATIENT)
Dept: CARDIOLOGY CLINIC | Age: 83
End: 2018-12-26

## 2018-12-26 ENCOUNTER — HOSPITAL ENCOUNTER (INPATIENT)
Age: 83
LOS: 3 days | Discharge: HOME HEALTH | DRG: 292 | End: 2018-12-29
Attending: EMERGENCY MEDICINE | Admitting: INTERNAL MEDICINE
Payer: MEDICARE

## 2018-12-26 ENCOUNTER — APPOINTMENT (OUTPATIENT)
Dept: GENERAL RADIOLOGY | Age: 83
DRG: 292 | End: 2018-12-26
Payer: MEDICARE

## 2018-12-26 DIAGNOSIS — L03.116 CELLULITIS OF LEFT LOWER EXTREMITY: ICD-10-CM

## 2018-12-26 DIAGNOSIS — I50.9 ACUTE CONGESTIVE HEART FAILURE, UNSPECIFIED HEART FAILURE TYPE (HCC): Primary | ICD-10-CM

## 2018-12-26 LAB
ANION GAP SERPL CALCULATED.3IONS-SCNC: 12 MMOL/L (ref 3–16)
BASOPHILS ABSOLUTE: 0 K/UL (ref 0–0.2)
BASOPHILS RELATIVE PERCENT: 0.5 %
BUN BLDV-MCNC: 10 MG/DL (ref 7–20)
CALCIUM SERPL-MCNC: 8.5 MG/DL (ref 8.3–10.6)
CHLORIDE BLD-SCNC: 99 MMOL/L (ref 99–110)
CO2: 29 MMOL/L (ref 21–32)
CREAT SERPL-MCNC: 1.3 MG/DL (ref 0.8–1.3)
EKG ATRIAL RATE: 73 BPM
EKG DIAGNOSIS: NORMAL
EKG Q-T INTERVAL: 408 MS
EKG QRS DURATION: 128 MS
EKG QTC CALCULATION (BAZETT): 479 MS
EKG R AXIS: 18 DEGREES
EKG T AXIS: 34 DEGREES
EKG VENTRICULAR RATE: 83 BPM
EOSINOPHILS ABSOLUTE: 0 K/UL (ref 0–0.6)
EOSINOPHILS RELATIVE PERCENT: 0.6 %
GFR AFRICAN AMERICAN: >60
GFR NON-AFRICAN AMERICAN: 52
GLUCOSE BLD-MCNC: 200 MG/DL (ref 70–99)
HCT VFR BLD CALC: 38.5 % (ref 40.5–52.5)
HEMOGLOBIN: 13.1 G/DL (ref 13.5–17.5)
INR BLD: 4.01 (ref 0.86–1.14)
LYMPHOCYTES ABSOLUTE: 1.3 K/UL (ref 1–5.1)
LYMPHOCYTES RELATIVE PERCENT: 15.9 %
MCH RBC QN AUTO: 30.9 PG (ref 26–34)
MCHC RBC AUTO-ENTMCNC: 34 G/DL (ref 31–36)
MCV RBC AUTO: 90.9 FL (ref 80–100)
MONOCYTES ABSOLUTE: 0.7 K/UL (ref 0–1.3)
MONOCYTES RELATIVE PERCENT: 9.2 %
NEUTROPHILS ABSOLUTE: 5.8 K/UL (ref 1.7–7.7)
NEUTROPHILS RELATIVE PERCENT: 73.8 %
PDW BLD-RTO: 15.1 % (ref 12.4–15.4)
PLATELET # BLD: 190 K/UL (ref 135–450)
PMV BLD AUTO: 9 FL (ref 5–10.5)
POTASSIUM SERPL-SCNC: 3.4 MMOL/L (ref 3.5–5.1)
PRO-BNP: 474 PG/ML (ref 0–449)
PROTHROMBIN TIME: 45.7 SEC (ref 9.8–13)
RBC # BLD: 4.23 M/UL (ref 4.2–5.9)
SODIUM BLD-SCNC: 140 MMOL/L (ref 136–145)
TROPONIN: 0.01 NG/ML
WBC # BLD: 7.9 K/UL (ref 4–11)

## 2018-12-26 PROCEDURE — 6370000000 HC RX 637 (ALT 250 FOR IP): Performed by: INTERNAL MEDICINE

## 2018-12-26 PROCEDURE — 2580000003 HC RX 258: Performed by: INTERNAL MEDICINE

## 2018-12-26 PROCEDURE — 36415 COLL VENOUS BLD VENIPUNCTURE: CPT

## 2018-12-26 PROCEDURE — 2580000003 HC RX 258: Performed by: EMERGENCY MEDICINE

## 2018-12-26 PROCEDURE — 84443 ASSAY THYROID STIM HORMONE: CPT

## 2018-12-26 PROCEDURE — 80048 BASIC METABOLIC PNL TOTAL CA: CPT

## 2018-12-26 PROCEDURE — 1200000000 HC SEMI PRIVATE

## 2018-12-26 PROCEDURE — 83880 ASSAY OF NATRIURETIC PEPTIDE: CPT

## 2018-12-26 PROCEDURE — 99285 EMERGENCY DEPT VISIT HI MDM: CPT

## 2018-12-26 PROCEDURE — 6360000002 HC RX W HCPCS: Performed by: EMERGENCY MEDICINE

## 2018-12-26 PROCEDURE — 2500000003 HC RX 250 WO HCPCS: Performed by: EMERGENCY MEDICINE

## 2018-12-26 PROCEDURE — 93005 ELECTROCARDIOGRAM TRACING: CPT | Performed by: EMERGENCY MEDICINE

## 2018-12-26 PROCEDURE — 87040 BLOOD CULTURE FOR BACTERIA: CPT

## 2018-12-26 PROCEDURE — 96365 THER/PROPH/DIAG IV INF INIT: CPT

## 2018-12-26 PROCEDURE — 85610 PROTHROMBIN TIME: CPT

## 2018-12-26 PROCEDURE — 6360000002 HC RX W HCPCS: Performed by: INTERNAL MEDICINE

## 2018-12-26 PROCEDURE — 96375 TX/PRO/DX INJ NEW DRUG ADDON: CPT

## 2018-12-26 PROCEDURE — 99223 1ST HOSP IP/OBS HIGH 75: CPT | Performed by: INTERNAL MEDICINE

## 2018-12-26 PROCEDURE — 85025 COMPLETE CBC W/AUTO DIFF WBC: CPT

## 2018-12-26 PROCEDURE — 71046 X-RAY EXAM CHEST 2 VIEWS: CPT

## 2018-12-26 PROCEDURE — 84484 ASSAY OF TROPONIN QUANT: CPT

## 2018-12-26 RX ORDER — SACCHAROMYCES BOULARDII 250 MG
250 CAPSULE ORAL 2 TIMES DAILY
Status: DISCONTINUED | OUTPATIENT
Start: 2018-12-26 | End: 2018-12-29 | Stop reason: HOSPADM

## 2018-12-26 RX ORDER — DILTIAZEM HYDROCHLORIDE 300 MG/1
300 CAPSULE, COATED, EXTENDED RELEASE ORAL DAILY
Status: DISCONTINUED | OUTPATIENT
Start: 2018-12-27 | End: 2018-12-29 | Stop reason: HOSPADM

## 2018-12-26 RX ORDER — CLINDAMYCIN HYDROCHLORIDE 300 MG/1
300 CAPSULE ORAL EVERY 6 HOURS
Status: DISCONTINUED | OUTPATIENT
Start: 2018-12-26 | End: 2018-12-28

## 2018-12-26 RX ORDER — ASPIRIN 81 MG/1
81 TABLET, CHEWABLE ORAL DAILY
Status: DISCONTINUED | OUTPATIENT
Start: 2018-12-26 | End: 2018-12-29 | Stop reason: HOSPADM

## 2018-12-26 RX ORDER — SODIUM CHLORIDE 0.9 % (FLUSH) 0.9 %
10 SYRINGE (ML) INJECTION EVERY 12 HOURS SCHEDULED
Status: DISCONTINUED | OUTPATIENT
Start: 2018-12-26 | End: 2018-12-29 | Stop reason: HOSPADM

## 2018-12-26 RX ORDER — POTASSIUM CHLORIDE 20 MEQ/1
20 TABLET, EXTENDED RELEASE ORAL
Status: DISCONTINUED | OUTPATIENT
Start: 2018-12-27 | End: 2018-12-27

## 2018-12-26 RX ORDER — ATORVASTATIN CALCIUM 80 MG/1
80 TABLET, FILM COATED ORAL DAILY
Status: DISCONTINUED | OUTPATIENT
Start: 2018-12-26 | End: 2018-12-29 | Stop reason: HOSPADM

## 2018-12-26 RX ORDER — POTASSIUM CHLORIDE 20 MEQ/1
40 TABLET, EXTENDED RELEASE ORAL ONCE
Status: COMPLETED | OUTPATIENT
Start: 2018-12-26 | End: 2018-12-26

## 2018-12-26 RX ORDER — ONDANSETRON 2 MG/ML
4 INJECTION INTRAMUSCULAR; INTRAVENOUS EVERY 6 HOURS PRN
Status: DISCONTINUED | OUTPATIENT
Start: 2018-12-26 | End: 2018-12-29 | Stop reason: HOSPADM

## 2018-12-26 RX ORDER — FUROSEMIDE 10 MG/ML
60 INJECTION INTRAMUSCULAR; INTRAVENOUS ONCE
Status: COMPLETED | OUTPATIENT
Start: 2018-12-26 | End: 2018-12-26

## 2018-12-26 RX ORDER — CLINDAMYCIN PHOSPHATE 600 MG/50ML
600 INJECTION INTRAVENOUS ONCE
Status: COMPLETED | OUTPATIENT
Start: 2018-12-26 | End: 2018-12-26

## 2018-12-26 RX ORDER — FUROSEMIDE 10 MG/ML
40 INJECTION INTRAMUSCULAR; INTRAVENOUS 2 TIMES DAILY
Status: DISCONTINUED | OUTPATIENT
Start: 2018-12-26 | End: 2018-12-29

## 2018-12-26 RX ORDER — SODIUM CHLORIDE 0.9 % (FLUSH) 0.9 %
10 SYRINGE (ML) INJECTION PRN
Status: DISCONTINUED | OUTPATIENT
Start: 2018-12-26 | End: 2018-12-29 | Stop reason: HOSPADM

## 2018-12-26 RX ADMIN — POTASSIUM CHLORIDE 40 MEQ: 20 TABLET, EXTENDED RELEASE ORAL at 17:22

## 2018-12-26 RX ADMIN — ASPIRIN 81 MG 81 MG: 81 TABLET ORAL at 18:47

## 2018-12-26 RX ADMIN — FUROSEMIDE 40 MG: 10 INJECTION, SOLUTION INTRAMUSCULAR; INTRAVENOUS at 18:44

## 2018-12-26 RX ADMIN — CEFAZOLIN 1 G: 1 INJECTION, POWDER, FOR SOLUTION INTRAMUSCULAR; INTRAVENOUS at 16:47

## 2018-12-26 RX ADMIN — FUROSEMIDE 60 MG: 10 INJECTION, SOLUTION INTRAMUSCULAR; INTRAVENOUS at 16:47

## 2018-12-26 RX ADMIN — CLINDAMYCIN HYDROCHLORIDE 300 MG: 300 CAPSULE ORAL at 23:28

## 2018-12-26 RX ADMIN — ATORVASTATIN CALCIUM 80 MG: 80 TABLET, FILM COATED ORAL at 18:47

## 2018-12-26 RX ADMIN — Medication 10 ML: at 20:24

## 2018-12-26 RX ADMIN — CLINDAMYCIN PHOSPHATE 600 MG: 600 INJECTION, SOLUTION INTRAVENOUS at 18:43

## 2018-12-26 RX ADMIN — Medication 10 ML: at 18:42

## 2018-12-26 RX ADMIN — Medication 250 MG: at 20:24

## 2018-12-26 ASSESSMENT — PAIN SCALES - GENERAL
PAINLEVEL_OUTOF10: 0
PAINLEVEL_OUTOF10: 3

## 2018-12-26 ASSESSMENT — PAIN DESCRIPTION - PAIN TYPE: TYPE: ACUTE PAIN

## 2018-12-26 ASSESSMENT — PAIN DESCRIPTION - LOCATION: LOCATION: ANKLE

## 2018-12-26 ASSESSMENT — PAIN DESCRIPTION - ORIENTATION: ORIENTATION: LEFT

## 2018-12-27 LAB
ANION GAP SERPL CALCULATED.3IONS-SCNC: 12 MMOL/L (ref 3–16)
BASOPHILS ABSOLUTE: 0 K/UL (ref 0–0.2)
BASOPHILS RELATIVE PERCENT: 0.4 %
BUN BLDV-MCNC: 9 MG/DL (ref 7–20)
CALCIUM SERPL-MCNC: 8.1 MG/DL (ref 8.3–10.6)
CHLORIDE BLD-SCNC: 97 MMOL/L (ref 99–110)
CHOLESTEROL, TOTAL: 85 MG/DL (ref 0–199)
CO2: 29 MMOL/L (ref 21–32)
CREAT SERPL-MCNC: 1.2 MG/DL (ref 0.8–1.3)
EOSINOPHILS ABSOLUTE: 0.1 K/UL (ref 0–0.6)
EOSINOPHILS RELATIVE PERCENT: 1 %
GFR AFRICAN AMERICAN: >60
GFR NON-AFRICAN AMERICAN: 57
GLUCOSE BLD-MCNC: 127 MG/DL (ref 70–99)
HCT VFR BLD CALC: 36.9 % (ref 40.5–52.5)
HDLC SERPL-MCNC: 36 MG/DL (ref 40–60)
HEMOGLOBIN: 12.5 G/DL (ref 13.5–17.5)
INR BLD: 3.3 (ref 0.86–1.14)
LDL CHOLESTEROL CALCULATED: 36 MG/DL
LV EF: 65 %
LVEF MODALITY: NORMAL
LYMPHOCYTES ABSOLUTE: 1.9 K/UL (ref 1–5.1)
LYMPHOCYTES RELATIVE PERCENT: 22 %
MAGNESIUM: 1.3 MG/DL (ref 1.8–2.4)
MCH RBC QN AUTO: 31 PG (ref 26–34)
MCHC RBC AUTO-ENTMCNC: 33.7 G/DL (ref 31–36)
MCV RBC AUTO: 92.1 FL (ref 80–100)
MONOCYTES ABSOLUTE: 1 K/UL (ref 0–1.3)
MONOCYTES RELATIVE PERCENT: 11.1 %
NEUTROPHILS ABSOLUTE: 5.6 K/UL (ref 1.7–7.7)
NEUTROPHILS RELATIVE PERCENT: 65.5 %
PDW BLD-RTO: 15 % (ref 12.4–15.4)
PLATELET # BLD: 187 K/UL (ref 135–450)
PMV BLD AUTO: 8.2 FL (ref 5–10.5)
POTASSIUM SERPL-SCNC: 3.3 MMOL/L (ref 3.5–5.1)
PROTHROMBIN TIME: 37.6 SEC (ref 9.8–13)
RBC # BLD: 4.01 M/UL (ref 4.2–5.9)
REASON FOR REJECTION: NORMAL
REJECTED TEST: NORMAL
SODIUM BLD-SCNC: 138 MMOL/L (ref 136–145)
TRIGL SERPL-MCNC: 67 MG/DL (ref 0–150)
VLDLC SERPL CALC-MCNC: 13 MG/DL
WBC # BLD: 8.5 K/UL (ref 4–11)

## 2018-12-27 PROCEDURE — 6360000002 HC RX W HCPCS: Performed by: INTERNAL MEDICINE

## 2018-12-27 PROCEDURE — 1200000000 HC SEMI PRIVATE

## 2018-12-27 PROCEDURE — 83735 ASSAY OF MAGNESIUM: CPT

## 2018-12-27 PROCEDURE — 80048 BASIC METABOLIC PNL TOTAL CA: CPT

## 2018-12-27 PROCEDURE — 80061 LIPID PANEL: CPT

## 2018-12-27 PROCEDURE — 6370000000 HC RX 637 (ALT 250 FOR IP): Performed by: INTERNAL MEDICINE

## 2018-12-27 PROCEDURE — 6370000000 HC RX 637 (ALT 250 FOR IP): Performed by: STUDENT IN AN ORGANIZED HEALTH CARE EDUCATION/TRAINING PROGRAM

## 2018-12-27 PROCEDURE — 85025 COMPLETE CBC W/AUTO DIFF WBC: CPT

## 2018-12-27 PROCEDURE — 2580000003 HC RX 258: Performed by: INTERNAL MEDICINE

## 2018-12-27 PROCEDURE — 99233 SBSQ HOSP IP/OBS HIGH 50: CPT | Performed by: INTERNAL MEDICINE

## 2018-12-27 PROCEDURE — 85610 PROTHROMBIN TIME: CPT

## 2018-12-27 PROCEDURE — 93306 TTE W/DOPPLER COMPLETE: CPT

## 2018-12-27 PROCEDURE — 36415 COLL VENOUS BLD VENIPUNCTURE: CPT

## 2018-12-27 RX ORDER — POTASSIUM CHLORIDE 20 MEQ/1
40 TABLET, EXTENDED RELEASE ORAL ONCE
Status: COMPLETED | OUTPATIENT
Start: 2018-12-27 | End: 2018-12-27

## 2018-12-27 RX ORDER — POTASSIUM CHLORIDE 20 MEQ/1
40 TABLET, EXTENDED RELEASE ORAL
Status: DISCONTINUED | OUTPATIENT
Start: 2018-12-28 | End: 2018-12-27

## 2018-12-27 RX ADMIN — POTASSIUM CHLORIDE 40 MEQ: 20 TABLET, EXTENDED RELEASE ORAL at 10:37

## 2018-12-27 RX ADMIN — Medication 250 MG: at 20:19

## 2018-12-27 RX ADMIN — MAGNESIUM SULFATE HEPTAHYDRATE 3 G: 500 INJECTION, SOLUTION INTRAMUSCULAR; INTRAVENOUS at 10:41

## 2018-12-27 RX ADMIN — CLINDAMYCIN HYDROCHLORIDE 300 MG: 300 CAPSULE ORAL at 10:37

## 2018-12-27 RX ADMIN — CLINDAMYCIN HYDROCHLORIDE 300 MG: 300 CAPSULE ORAL at 04:37

## 2018-12-27 RX ADMIN — Medication 10 ML: at 20:19

## 2018-12-27 RX ADMIN — CLINDAMYCIN HYDROCHLORIDE 300 MG: 300 CAPSULE ORAL at 22:24

## 2018-12-27 RX ADMIN — ASPIRIN 81 MG 81 MG: 81 TABLET ORAL at 08:19

## 2018-12-27 RX ADMIN — Medication 250 MG: at 08:19

## 2018-12-27 RX ADMIN — FUROSEMIDE 40 MG: 10 INJECTION, SOLUTION INTRAMUSCULAR; INTRAVENOUS at 08:19

## 2018-12-27 RX ADMIN — Medication 10 ML: at 08:20

## 2018-12-27 RX ADMIN — CLINDAMYCIN HYDROCHLORIDE 300 MG: 300 CAPSULE ORAL at 17:16

## 2018-12-27 RX ADMIN — DILTIAZEM HYDROCHLORIDE 300 MG: 300 CAPSULE, COATED, EXTENDED RELEASE ORAL at 08:19

## 2018-12-27 RX ADMIN — FUROSEMIDE 40 MG: 10 INJECTION, SOLUTION INTRAMUSCULAR; INTRAVENOUS at 17:16

## 2018-12-27 RX ADMIN — ATORVASTATIN CALCIUM 80 MG: 80 TABLET, FILM COATED ORAL at 08:19

## 2018-12-27 ASSESSMENT — PAIN SCALES - GENERAL
PAINLEVEL_OUTOF10: 0
PAINLEVEL_OUTOF10: 4
PAINLEVEL_OUTOF10: 0

## 2018-12-28 ENCOUNTER — TELEPHONE (OUTPATIENT)
Dept: CARDIOLOGY CLINIC | Age: 83
End: 2018-12-28

## 2018-12-28 LAB
ANION GAP SERPL CALCULATED.3IONS-SCNC: 11 MMOL/L (ref 3–16)
BUN BLDV-MCNC: 9 MG/DL (ref 7–20)
CALCIUM SERPL-MCNC: 8 MG/DL (ref 8.3–10.6)
CHLORIDE BLD-SCNC: 97 MMOL/L (ref 99–110)
CO2: 30 MMOL/L (ref 21–32)
CREAT SERPL-MCNC: 1.2 MG/DL (ref 0.8–1.3)
EKG ATRIAL RATE: 87 BPM
EKG DIAGNOSIS: NORMAL
EKG P AXIS: 57 DEGREES
EKG P-R INTERVAL: 236 MS
EKG Q-T INTERVAL: 442 MS
EKG QRS DURATION: 138 MS
EKG QTC CALCULATION (BAZETT): 531 MS
EKG R AXIS: 1 DEGREES
EKG T AXIS: 61 DEGREES
EKG VENTRICULAR RATE: 87 BPM
GFR AFRICAN AMERICAN: >60
GFR NON-AFRICAN AMERICAN: 57
GLUCOSE BLD-MCNC: 148 MG/DL (ref 70–99)
HCT VFR BLD CALC: 37.3 % (ref 40.5–52.5)
HEMOGLOBIN: 12.8 G/DL (ref 13.5–17.5)
INR BLD: 2.77 (ref 0.86–1.14)
MAGNESIUM: 1.7 MG/DL (ref 1.8–2.4)
MCH RBC QN AUTO: 30.8 PG (ref 26–34)
MCHC RBC AUTO-ENTMCNC: 34.4 G/DL (ref 31–36)
MCV RBC AUTO: 89.5 FL (ref 80–100)
PDW BLD-RTO: 14.8 % (ref 12.4–15.4)
PLATELET # BLD: 209 K/UL (ref 135–450)
PMV BLD AUTO: 8.6 FL (ref 5–10.5)
POTASSIUM REFLEX MAGNESIUM: 3.6 MMOL/L (ref 3.5–5.1)
POTASSIUM SERPL-SCNC: 2.9 MMOL/L (ref 3.5–5.1)
PRO-BNP: 391 PG/ML (ref 0–449)
PROTHROMBIN TIME: 31.6 SEC (ref 9.8–13)
RBC # BLD: 4.16 M/UL (ref 4.2–5.9)
SODIUM BLD-SCNC: 138 MMOL/L (ref 136–145)
TSH SERPL DL<=0.05 MIU/L-ACNC: 3.13 UIU/ML (ref 0.27–4.2)
WBC # BLD: 9.6 K/UL (ref 4–11)

## 2018-12-28 PROCEDURE — G0238 OTH RESP PROC, INDIV: HCPCS

## 2018-12-28 PROCEDURE — 51798 US URINE CAPACITY MEASURE: CPT

## 2018-12-28 PROCEDURE — 6370000000 HC RX 637 (ALT 250 FOR IP): Performed by: INTERNAL MEDICINE

## 2018-12-28 PROCEDURE — 1200000000 HC SEMI PRIVATE

## 2018-12-28 PROCEDURE — 6360000002 HC RX W HCPCS: Performed by: STUDENT IN AN ORGANIZED HEALTH CARE EDUCATION/TRAINING PROGRAM

## 2018-12-28 PROCEDURE — 97535 SELF CARE MNGMENT TRAINING: CPT

## 2018-12-28 PROCEDURE — 85027 COMPLETE CBC AUTOMATED: CPT

## 2018-12-28 PROCEDURE — 93005 ELECTROCARDIOGRAM TRACING: CPT | Performed by: INTERNAL MEDICINE

## 2018-12-28 PROCEDURE — 83735 ASSAY OF MAGNESIUM: CPT

## 2018-12-28 PROCEDURE — G8988 SELF CARE GOAL STATUS: HCPCS

## 2018-12-28 PROCEDURE — 6370000000 HC RX 637 (ALT 250 FOR IP): Performed by: STUDENT IN AN ORGANIZED HEALTH CARE EDUCATION/TRAINING PROGRAM

## 2018-12-28 PROCEDURE — 83880 ASSAY OF NATRIURETIC PEPTIDE: CPT

## 2018-12-28 PROCEDURE — 85610 PROTHROMBIN TIME: CPT

## 2018-12-28 PROCEDURE — 36415 COLL VENOUS BLD VENIPUNCTURE: CPT

## 2018-12-28 PROCEDURE — 99233 SBSQ HOSP IP/OBS HIGH 50: CPT | Performed by: INTERNAL MEDICINE

## 2018-12-28 PROCEDURE — 84132 ASSAY OF SERUM POTASSIUM: CPT

## 2018-12-28 PROCEDURE — 94760 N-INVAS EAR/PLS OXIMETRY 1: CPT

## 2018-12-28 PROCEDURE — 97165 OT EVAL LOW COMPLEX 30 MIN: CPT

## 2018-12-28 PROCEDURE — 2580000003 HC RX 258: Performed by: INTERNAL MEDICINE

## 2018-12-28 PROCEDURE — 93010 ELECTROCARDIOGRAM REPORT: CPT | Performed by: INTERNAL MEDICINE

## 2018-12-28 PROCEDURE — 6360000002 HC RX W HCPCS: Performed by: INTERNAL MEDICINE

## 2018-12-28 PROCEDURE — G8987 SELF CARE CURRENT STATUS: HCPCS

## 2018-12-28 PROCEDURE — 97530 THERAPEUTIC ACTIVITIES: CPT

## 2018-12-28 PROCEDURE — 80048 BASIC METABOLIC PNL TOTAL CA: CPT

## 2018-12-28 RX ORDER — WARFARIN SODIUM 5 MG/1
5 TABLET ORAL
Status: DISCONTINUED | OUTPATIENT
Start: 2018-12-28 | End: 2018-12-29 | Stop reason: HOSPADM

## 2018-12-28 RX ORDER — POTASSIUM CHLORIDE 20 MEQ/1
40 TABLET, EXTENDED RELEASE ORAL ONCE
Status: COMPLETED | OUTPATIENT
Start: 2018-12-28 | End: 2018-12-28

## 2018-12-28 RX ORDER — MAGNESIUM SULFATE IN WATER 40 MG/ML
2 INJECTION, SOLUTION INTRAVENOUS ONCE
Status: COMPLETED | OUTPATIENT
Start: 2018-12-28 | End: 2018-12-28

## 2018-12-28 RX ORDER — AMIODARONE HYDROCHLORIDE 200 MG/1
200 TABLET ORAL DAILY
Status: DISCONTINUED | OUTPATIENT
Start: 2018-12-28 | End: 2018-12-29 | Stop reason: HOSPADM

## 2018-12-28 RX ORDER — POTASSIUM CHLORIDE 7.45 MG/ML
10 INJECTION INTRAVENOUS ONCE
Status: COMPLETED | OUTPATIENT
Start: 2018-12-28 | End: 2018-12-28

## 2018-12-28 RX ORDER — MAGNESIUM SULFATE 1 G/100ML
1 INJECTION INTRAVENOUS ONCE
Status: COMPLETED | OUTPATIENT
Start: 2018-12-28 | End: 2018-12-28

## 2018-12-28 RX ORDER — FUROSEMIDE 40 MG/1
40 TABLET ORAL DAILY
Qty: 30 TABLET | Refills: 0 | Status: CANCELLED | OUTPATIENT
Start: 2018-12-28 | End: 2019-01-27

## 2018-12-28 RX ORDER — WARFARIN SODIUM 2.5 MG/1
2.5 TABLET ORAL
Status: DISCONTINUED | OUTPATIENT
Start: 2018-12-30 | End: 2018-12-29 | Stop reason: HOSPADM

## 2018-12-28 RX ORDER — SACCHAROMYCES BOULARDII 250 MG
250 CAPSULE ORAL 2 TIMES DAILY
Qty: 20 CAPSULE | Refills: 0 | Status: CANCELLED | OUTPATIENT
Start: 2018-12-28 | End: 2019-01-07

## 2018-12-28 RX ORDER — POTASSIUM CHLORIDE 20 MEQ/1
20 TABLET, EXTENDED RELEASE ORAL DAILY
Qty: 30 TABLET | Refills: 0 | Status: CANCELLED | OUTPATIENT
Start: 2018-12-28 | End: 2019-01-27

## 2018-12-28 RX ORDER — CLINDAMYCIN HYDROCHLORIDE 300 MG/1
600 CAPSULE ORAL EVERY 8 HOURS SCHEDULED
Qty: 60 CAPSULE | Refills: 0 | Status: CANCELLED | OUTPATIENT
Start: 2018-12-28 | End: 2019-01-07

## 2018-12-28 RX ORDER — CLINDAMYCIN HYDROCHLORIDE 300 MG/1
600 CAPSULE ORAL EVERY 8 HOURS SCHEDULED
Status: DISCONTINUED | OUTPATIENT
Start: 2018-12-28 | End: 2018-12-29 | Stop reason: HOSPADM

## 2018-12-28 RX ADMIN — POTASSIUM CHLORIDE 10 MEQ: 7.46 INJECTION, SOLUTION INTRAVENOUS at 06:47

## 2018-12-28 RX ADMIN — AMIODARONE HYDROCHLORIDE 200 MG: 200 TABLET ORAL at 10:26

## 2018-12-28 RX ADMIN — FUROSEMIDE 40 MG: 10 INJECTION, SOLUTION INTRAMUSCULAR; INTRAVENOUS at 08:21

## 2018-12-28 RX ADMIN — Medication 250 MG: at 21:40

## 2018-12-28 RX ADMIN — Medication 10 ML: at 08:21

## 2018-12-28 RX ADMIN — DILTIAZEM HYDROCHLORIDE 300 MG: 300 CAPSULE, COATED, EXTENDED RELEASE ORAL at 08:21

## 2018-12-28 RX ADMIN — Medication 250 MG: at 08:20

## 2018-12-28 RX ADMIN — Medication 10 ML: at 21:42

## 2018-12-28 RX ADMIN — CLINDAMYCIN HYDROCHLORIDE 600 MG: 300 CAPSULE ORAL at 21:40

## 2018-12-28 RX ADMIN — CLINDAMYCIN HYDROCHLORIDE 600 MG: 300 CAPSULE ORAL at 14:49

## 2018-12-28 RX ADMIN — MAGNESIUM SULFATE IN DEXTROSE 1 G: 10 INJECTION, SOLUTION INTRAVENOUS at 10:30

## 2018-12-28 RX ADMIN — MAGNESIUM SULFATE HEPTAHYDRATE 2 G: 40 INJECTION, SOLUTION INTRAVENOUS at 08:07

## 2018-12-28 RX ADMIN — ASPIRIN 81 MG 81 MG: 81 TABLET ORAL at 08:21

## 2018-12-28 RX ADMIN — DICLOFENAC 2 G: 10 GEL TOPICAL at 20:22

## 2018-12-28 RX ADMIN — DICLOFENAC 2 G: 10 GEL TOPICAL at 10:26

## 2018-12-28 RX ADMIN — WARFARIN SODIUM 5 MG: 5 TABLET ORAL at 18:45

## 2018-12-28 RX ADMIN — ATORVASTATIN CALCIUM 80 MG: 80 TABLET, FILM COATED ORAL at 08:21

## 2018-12-28 RX ADMIN — POTASSIUM CHLORIDE 40 MEQ: 20 TABLET, EXTENDED RELEASE ORAL at 06:47

## 2018-12-28 RX ADMIN — CLINDAMYCIN HYDROCHLORIDE 300 MG: 300 CAPSULE ORAL at 05:14

## 2018-12-28 RX ADMIN — FUROSEMIDE 40 MG: 10 INJECTION, SOLUTION INTRAMUSCULAR; INTRAVENOUS at 18:45

## 2018-12-28 ASSESSMENT — PAIN SCALES - GENERAL
PAINLEVEL_OUTOF10: 0
PAINLEVEL_OUTOF10: 0
PAINLEVEL_OUTOF10: 6
PAINLEVEL_OUTOF10: 0

## 2018-12-28 ASSESSMENT — PAIN DESCRIPTION - PAIN TYPE: TYPE: ACUTE PAIN

## 2018-12-28 NOTE — TELEPHONE ENCOUNTER
Patient's daughter, Álvaro Zepeda called to see if labs will be needed before visit on 1/2/19 with Dr. Vincent Stein. They have been going to the lab next door. Please call her at [phone]. Thanks.

## 2018-12-29 VITALS
DIASTOLIC BLOOD PRESSURE: 79 MMHG | TEMPERATURE: 97.4 F | HEIGHT: 71 IN | BODY MASS INDEX: 27.72 KG/M2 | SYSTOLIC BLOOD PRESSURE: 125 MMHG | WEIGHT: 197.97 LBS | HEART RATE: 76 BPM | RESPIRATION RATE: 20 BRPM | OXYGEN SATURATION: 97 %

## 2018-12-29 LAB
ANION GAP SERPL CALCULATED.3IONS-SCNC: 10 MMOL/L (ref 3–16)
BUN BLDV-MCNC: 13 MG/DL (ref 7–20)
CALCIUM SERPL-MCNC: 8.2 MG/DL (ref 8.3–10.6)
CHLORIDE BLD-SCNC: 96 MMOL/L (ref 99–110)
CO2: 29 MMOL/L (ref 21–32)
CREAT SERPL-MCNC: 1.2 MG/DL (ref 0.8–1.3)
EKG ATRIAL RATE: 79 BPM
EKG ATRIAL RATE: 83 BPM
EKG DIAGNOSIS: NORMAL
EKG DIAGNOSIS: NORMAL
EKG P AXIS: 41 DEGREES
EKG P AXIS: 48 DEGREES
EKG P-R INTERVAL: 210 MS
EKG P-R INTERVAL: 248 MS
EKG Q-T INTERVAL: 416 MS
EKG Q-T INTERVAL: 446 MS
EKG QRS DURATION: 136 MS
EKG QRS DURATION: 148 MS
EKG QTC CALCULATION (BAZETT): 488 MS
EKG QTC CALCULATION (BAZETT): 511 MS
EKG R AXIS: -6 DEGREES
EKG R AXIS: 12 DEGREES
EKG T AXIS: 26 DEGREES
EKG T AXIS: 33 DEGREES
EKG VENTRICULAR RATE: 79 BPM
EKG VENTRICULAR RATE: 83 BPM
GFR AFRICAN AMERICAN: >60
GFR NON-AFRICAN AMERICAN: 57
GLUCOSE BLD-MCNC: 143 MG/DL (ref 70–99)
HCT VFR BLD CALC: 39.4 % (ref 40.5–52.5)
HEMOGLOBIN: 13.4 G/DL (ref 13.5–17.5)
INR BLD: 2.54 (ref 0.86–1.14)
MAGNESIUM: 2 MG/DL (ref 1.8–2.4)
MCH RBC QN AUTO: 30.8 PG (ref 26–34)
MCHC RBC AUTO-ENTMCNC: 34.1 G/DL (ref 31–36)
MCV RBC AUTO: 90.3 FL (ref 80–100)
PDW BLD-RTO: 14.9 % (ref 12.4–15.4)
PLATELET # BLD: 209 K/UL (ref 135–450)
PMV BLD AUTO: 8.5 FL (ref 5–10.5)
POTASSIUM SERPL-SCNC: 2.9 MMOL/L (ref 3.5–5.1)
POTASSIUM SERPL-SCNC: 3.6 MMOL/L (ref 3.5–5.1)
PROTHROMBIN TIME: 28.9 SEC (ref 9.8–13)
RBC # BLD: 4.36 M/UL (ref 4.2–5.9)
SODIUM BLD-SCNC: 135 MMOL/L (ref 136–145)
WBC # BLD: 10.3 K/UL (ref 4–11)

## 2018-12-29 PROCEDURE — 6370000000 HC RX 637 (ALT 250 FOR IP): Performed by: INTERNAL MEDICINE

## 2018-12-29 PROCEDURE — 83735 ASSAY OF MAGNESIUM: CPT

## 2018-12-29 PROCEDURE — 85027 COMPLETE CBC AUTOMATED: CPT

## 2018-12-29 PROCEDURE — 93010 ELECTROCARDIOGRAM REPORT: CPT | Performed by: INTERNAL MEDICINE

## 2018-12-29 PROCEDURE — 94760 N-INVAS EAR/PLS OXIMETRY 1: CPT

## 2018-12-29 PROCEDURE — 80048 BASIC METABOLIC PNL TOTAL CA: CPT

## 2018-12-29 PROCEDURE — 99233 SBSQ HOSP IP/OBS HIGH 50: CPT | Performed by: NURSE PRACTITIONER

## 2018-12-29 PROCEDURE — 85610 PROTHROMBIN TIME: CPT

## 2018-12-29 PROCEDURE — 36415 COLL VENOUS BLD VENIPUNCTURE: CPT

## 2018-12-29 PROCEDURE — 93005 ELECTROCARDIOGRAM TRACING: CPT | Performed by: INTERNAL MEDICINE

## 2018-12-29 PROCEDURE — 84132 ASSAY OF SERUM POTASSIUM: CPT

## 2018-12-29 PROCEDURE — 2580000003 HC RX 258: Performed by: INTERNAL MEDICINE

## 2018-12-29 PROCEDURE — 6370000000 HC RX 637 (ALT 250 FOR IP): Performed by: STUDENT IN AN ORGANIZED HEALTH CARE EDUCATION/TRAINING PROGRAM

## 2018-12-29 RX ORDER — POTASSIUM CHLORIDE 20 MEQ/1
20 TABLET, EXTENDED RELEASE ORAL ONCE
Status: COMPLETED | OUTPATIENT
Start: 2018-12-29 | End: 2018-12-29

## 2018-12-29 RX ORDER — CLINDAMYCIN HYDROCHLORIDE 300 MG/1
600 CAPSULE ORAL EVERY 8 HOURS SCHEDULED
Qty: 60 CAPSULE | Refills: 0 | Status: SHIPPED | OUTPATIENT
Start: 2018-12-29 | End: 2018-12-29

## 2018-12-29 RX ORDER — AMIODARONE HYDROCHLORIDE 200 MG/1
200 TABLET ORAL DAILY
Qty: 30 TABLET | Refills: 3 | Status: SHIPPED | OUTPATIENT
Start: 2018-12-29 | End: 2019-04-03 | Stop reason: SDUPTHER

## 2018-12-29 RX ORDER — FUROSEMIDE 40 MG/1
40 TABLET ORAL DAILY
Qty: 60 TABLET | Refills: 3 | Status: SHIPPED | OUTPATIENT
Start: 2018-12-29 | End: 2018-12-29

## 2018-12-29 RX ORDER — FUROSEMIDE 40 MG/1
40 TABLET ORAL DAILY
Status: DISCONTINUED | OUTPATIENT
Start: 2018-12-29 | End: 2018-12-29 | Stop reason: HOSPADM

## 2018-12-29 RX ORDER — FUROSEMIDE 40 MG/1
40 TABLET ORAL DAILY
Qty: 60 TABLET | Refills: 3 | Status: SHIPPED | OUTPATIENT
Start: 2018-12-29 | End: 2019-01-02 | Stop reason: SDUPTHER

## 2018-12-29 RX ORDER — POTASSIUM CHLORIDE 29.8 MG/ML
10 INJECTION INTRAVENOUS
Status: DISCONTINUED | OUTPATIENT
Start: 2018-12-29 | End: 2018-12-29

## 2018-12-29 RX ORDER — POTASSIUM CHLORIDE 20 MEQ/1
40 TABLET, EXTENDED RELEASE ORAL 2 TIMES DAILY WITH MEALS
Status: DISCONTINUED | OUTPATIENT
Start: 2018-12-29 | End: 2018-12-29 | Stop reason: HOSPADM

## 2018-12-29 RX ORDER — POTASSIUM CHLORIDE 20 MEQ/1
20 TABLET, EXTENDED RELEASE ORAL DAILY
Qty: 60 TABLET | Refills: 3 | Status: SHIPPED | OUTPATIENT
Start: 2018-12-29 | End: 2019-01-02 | Stop reason: SDUPTHER

## 2018-12-29 RX ORDER — AMIODARONE HYDROCHLORIDE 200 MG/1
200 TABLET ORAL DAILY
Qty: 30 TABLET | Refills: 3 | Status: SHIPPED | OUTPATIENT
Start: 2018-12-29 | End: 2018-12-29

## 2018-12-29 RX ORDER — L. ACIDOPHILUS/L.BULGARICUS 100MM CELL
1 GRANULES IN PACKET (EA) ORAL 2 TIMES DAILY
Qty: 20 PACKET | Refills: 0 | Status: SHIPPED | OUTPATIENT
Start: 2018-12-29 | End: 2019-01-08

## 2018-12-29 RX ORDER — CLINDAMYCIN HYDROCHLORIDE 300 MG/1
600 CAPSULE ORAL EVERY 8 HOURS SCHEDULED
Qty: 60 CAPSULE | Refills: 0 | Status: SHIPPED | OUTPATIENT
Start: 2018-12-29 | End: 2019-01-08

## 2018-12-29 RX ORDER — POTASSIUM CHLORIDE 20 MEQ/1
20 TABLET, EXTENDED RELEASE ORAL DAILY
Qty: 60 TABLET | Refills: 3 | Status: SHIPPED | OUTPATIENT
Start: 2018-12-29 | End: 2018-12-29

## 2018-12-29 RX ORDER — POTASSIUM CHLORIDE 20 MEQ/1
40 TABLET, EXTENDED RELEASE ORAL 2 TIMES DAILY WITH MEALS
Status: DISCONTINUED | OUTPATIENT
Start: 2018-12-29 | End: 2018-12-29

## 2018-12-29 RX ORDER — POTASSIUM CHLORIDE 20 MEQ/1
40 TABLET, EXTENDED RELEASE ORAL
Status: DISCONTINUED | OUTPATIENT
Start: 2018-12-29 | End: 2018-12-29

## 2018-12-29 RX ORDER — L. ACIDOPHILUS/L.BULGARICUS 100MM CELL
1 GRANULES IN PACKET (EA) ORAL 2 TIMES DAILY
Qty: 20 PACKET | Refills: 0 | Status: SHIPPED | OUTPATIENT
Start: 2018-12-29 | End: 2018-12-29

## 2018-12-29 RX ADMIN — FUROSEMIDE 40 MG: 40 TABLET ORAL at 09:08

## 2018-12-29 RX ADMIN — POTASSIUM CHLORIDE 20 MEQ: 20 TABLET, EXTENDED RELEASE ORAL at 16:04

## 2018-12-29 RX ADMIN — AMIODARONE HYDROCHLORIDE 200 MG: 200 TABLET ORAL at 09:08

## 2018-12-29 RX ADMIN — Medication 10 ML: at 09:10

## 2018-12-29 RX ADMIN — DILTIAZEM HYDROCHLORIDE 300 MG: 300 CAPSULE, COATED, EXTENDED RELEASE ORAL at 09:08

## 2018-12-29 RX ADMIN — CLINDAMYCIN HYDROCHLORIDE 600 MG: 300 CAPSULE ORAL at 06:55

## 2018-12-29 RX ADMIN — Medication 250 MG: at 09:06

## 2018-12-29 RX ADMIN — ATORVASTATIN CALCIUM 80 MG: 80 TABLET, FILM COATED ORAL at 09:06

## 2018-12-29 RX ADMIN — ASPIRIN 81 MG 81 MG: 81 TABLET ORAL at 09:08

## 2018-12-29 RX ADMIN — POTASSIUM CHLORIDE 40 MEQ: 20 TABLET, EXTENDED RELEASE ORAL at 06:55

## 2018-12-29 RX ADMIN — CLINDAMYCIN HYDROCHLORIDE 600 MG: 300 CAPSULE ORAL at 16:04

## 2018-12-29 ASSESSMENT — PAIN SCALES - GENERAL
PAINLEVEL_OUTOF10: 0

## 2018-12-31 ENCOUNTER — CARE COORDINATION (OUTPATIENT)
Dept: CASE MANAGEMENT | Age: 83
End: 2018-12-31

## 2018-12-31 DIAGNOSIS — I50.9 CHF WITH UNKNOWN LVEF (HCC): Primary | ICD-10-CM

## 2018-12-31 LAB
BLOOD CULTURE, ROUTINE: NORMAL
CULTURE, BLOOD 2: NORMAL

## 2018-12-31 PROCEDURE — 1111F DSCHRG MED/CURRENT MED MERGE: CPT | Performed by: INTERNAL MEDICINE

## 2018-12-31 NOTE — CARE COORDINATION
[facility] Transitions Initial Follow Up Call    Call within 2 business days of discharge: No. RRS changed and after call, noted pt has [facility] PCP    Patient: Kae Sorto Patient : 1931   MRN: [id number]  Reason for Admission: CHF  Discharge Date: 18 RARS: Readmission Risk Score: 14     Spoke with: [address]: [facility]  Non-face-to-face services provided:  Scheduled appointment with Specialist-cardiology  Obtained and reviewed discharge summary and/or continuity of care documents  Assessment and support for treatment adherence and medication management-med rec 1111f completed. Care Transitions 24 Hour Call    Do you have any ongoing symptoms?:  No  Do you have a copy of your discharge instructions?:  Yes  Do you have all of your prescriptions and are they filled?:  Yes  Have you been contacted by a [address]?:  No  Have you scheduled your follow up appointment?:  Yes  How are you going to get to your appointment?:  Car - family or friend to transport  Were you discharged with any Home Care or Post Acute Services:  No  Do you feel like you have everything you need to keep you well at home?:  Yes  Care Transitions Interventions     Care Transition f/u call to pt. And spoke with Laura Chavez dtr. She said pt is doing \"good\". She and her sister are assisting pt with his meds. Med rec 4414N completed without discrepancy. They have new meds as noted on AVS.   Pt has f/u appt scheduled. Review to check wt daily and to call for wt gain of 3# in 1 day or 5# over 1 week and she expressed understanding. Follow Up  Future Appointments  Date Time Provider Polly Armendariz   2019 11:30 AM Casimiro Alvarez MD [facility] [address] [place] Transition Coordinator  [phone]  [email or web address]

## 2019-01-02 ENCOUNTER — OFFICE VISIT (OUTPATIENT)
Dept: CARDIOLOGY CLINIC | Age: 84
End: 2019-01-02
Payer: MEDICARE

## 2019-01-02 ENCOUNTER — TELEPHONE (OUTPATIENT)
Dept: CARDIOLOGY CLINIC | Age: 84
End: 2019-01-02

## 2019-01-02 ENCOUNTER — ANTI-COAG VISIT (OUTPATIENT)
Dept: PHARMACY | Age: 84
End: 2019-01-02
Payer: MEDICARE

## 2019-01-02 VITALS
DIASTOLIC BLOOD PRESSURE: 80 MMHG | BODY MASS INDEX: 27.98 KG/M2 | SYSTOLIC BLOOD PRESSURE: 130 MMHG | HEART RATE: 60 BPM | WEIGHT: 200.6 LBS

## 2019-01-02 DIAGNOSIS — I48.92 ATRIAL FLUTTER, UNSPECIFIED TYPE (HCC): ICD-10-CM

## 2019-01-02 DIAGNOSIS — I48.0 PAROXYSMAL ATRIAL FIBRILLATION (HCC): ICD-10-CM

## 2019-01-02 DIAGNOSIS — I50.9 CHF WITH UNKNOWN LVEF (HCC): ICD-10-CM

## 2019-01-02 DIAGNOSIS — I10 ESSENTIAL HYPERTENSION, BENIGN: Primary | ICD-10-CM

## 2019-01-02 DIAGNOSIS — I50.33 ACUTE ON CHRONIC DIASTOLIC HEART FAILURE (HCC): ICD-10-CM

## 2019-01-02 LAB — INTERNATIONAL NORMALIZATION RATIO, POC: 3

## 2019-01-02 PROCEDURE — 85610 PROTHROMBIN TIME: CPT

## 2019-01-02 PROCEDURE — 99214 OFFICE O/P EST MOD 30 MIN: CPT | Performed by: INTERNAL MEDICINE

## 2019-01-02 PROCEDURE — 99211 OFF/OP EST MAY X REQ PHY/QHP: CPT

## 2019-01-02 RX ORDER — POTASSIUM CHLORIDE 20 MEQ/1
20 TABLET, EXTENDED RELEASE ORAL DAILY
Qty: 180 TABLET | Refills: 3 | Status: SHIPPED | OUTPATIENT
Start: 2019-01-02 | End: 2019-01-14 | Stop reason: ALTCHOICE

## 2019-01-02 RX ORDER — FUROSEMIDE 40 MG/1
40 TABLET ORAL 2 TIMES DAILY
Qty: 60 TABLET | Refills: 5 | Status: SHIPPED | OUTPATIENT
Start: 2019-01-02 | End: 2019-01-14 | Stop reason: SDUPTHER

## 2019-01-09 ENCOUNTER — OFFICE VISIT (OUTPATIENT)
Dept: INTERNAL MEDICINE CLINIC | Age: 84
End: 2019-01-09
Payer: MEDICARE

## 2019-01-09 VITALS
SYSTOLIC BLOOD PRESSURE: 130 MMHG | HEIGHT: 68 IN | BODY MASS INDEX: 30.04 KG/M2 | OXYGEN SATURATION: 96 % | DIASTOLIC BLOOD PRESSURE: 72 MMHG | WEIGHT: 198.2 LBS | HEART RATE: 76 BPM

## 2019-01-09 DIAGNOSIS — I48.0 PAROXYSMAL ATRIAL FIBRILLATION (HCC): ICD-10-CM

## 2019-01-09 DIAGNOSIS — I50.9 CHF WITH UNKNOWN LVEF (HCC): ICD-10-CM

## 2019-01-09 DIAGNOSIS — I10 ESSENTIAL HYPERTENSION, BENIGN: ICD-10-CM

## 2019-01-09 DIAGNOSIS — I48.92 ATRIAL FLUTTER, UNSPECIFIED TYPE (HCC): ICD-10-CM

## 2019-01-09 DIAGNOSIS — L03.116 CELLULITIS OF LEFT FOOT: ICD-10-CM

## 2019-01-09 DIAGNOSIS — I10 ESSENTIAL HYPERTENSION, BENIGN: Primary | ICD-10-CM

## 2019-01-09 LAB
ANION GAP SERPL CALCULATED.3IONS-SCNC: 17 MMOL/L (ref 3–16)
BUN BLDV-MCNC: 14 MG/DL (ref 7–20)
CALCIUM SERPL-MCNC: 8.8 MG/DL (ref 8.3–10.6)
CHLORIDE BLD-SCNC: 94 MMOL/L (ref 99–110)
CO2: 29 MMOL/L (ref 21–32)
CREAT SERPL-MCNC: 1.5 MG/DL (ref 0.8–1.3)
GFR AFRICAN AMERICAN: 54
GFR NON-AFRICAN AMERICAN: 44
GLUCOSE BLD-MCNC: 105 MG/DL (ref 70–99)
POTASSIUM SERPL-SCNC: 3.4 MMOL/L (ref 3.5–5.1)
SODIUM BLD-SCNC: 140 MMOL/L (ref 136–145)

## 2019-01-09 PROCEDURE — 99204 OFFICE O/P NEW MOD 45 MIN: CPT | Performed by: NURSE PRACTITIONER

## 2019-01-09 PROCEDURE — 3288F FALL RISK ASSESSMENT DOCD: CPT | Performed by: NURSE PRACTITIONER

## 2019-01-09 RX ORDER — CLINDAMYCIN HYDROCHLORIDE 300 MG/1
600 CAPSULE ORAL 3 TIMES DAILY
Qty: 30 CAPSULE | Refills: 0 | Status: SHIPPED | OUTPATIENT
Start: 2019-01-09 | End: 2019-01-19

## 2019-01-09 ASSESSMENT — ENCOUNTER SYMPTOMS
SHORTNESS OF BREATH: 1
ABDOMINAL PAIN: 0

## 2019-01-10 ENCOUNTER — TELEPHONE (OUTPATIENT)
Dept: CARDIOLOGY CLINIC | Age: 84
End: 2019-01-10

## 2019-01-10 DIAGNOSIS — R94.4 DECREASED GFR: Primary | ICD-10-CM

## 2019-01-10 DIAGNOSIS — R79.89 ELEVATED SERUM CREATININE: ICD-10-CM

## 2019-01-11 ENCOUNTER — TELEPHONE (OUTPATIENT)
Dept: INTERNAL MEDICINE CLINIC | Age: 84
End: 2019-01-11

## 2019-01-11 ENCOUNTER — ANTI-COAG VISIT (OUTPATIENT)
Dept: PHARMACY | Age: 84
End: 2019-01-11
Payer: MEDICARE

## 2019-01-11 DIAGNOSIS — I48.0 PAROXYSMAL ATRIAL FIBRILLATION (HCC): ICD-10-CM

## 2019-01-11 DIAGNOSIS — R94.4 DECREASED GFR: ICD-10-CM

## 2019-01-11 DIAGNOSIS — I48.92 ATRIAL FLUTTER, UNSPECIFIED TYPE (HCC): ICD-10-CM

## 2019-01-11 DIAGNOSIS — R79.89 ELEVATED SERUM CREATININE: ICD-10-CM

## 2019-01-11 LAB
ALBUMIN SERPL-MCNC: 3.7 G/DL (ref 3.4–5)
ANION GAP SERPL CALCULATED.3IONS-SCNC: 13 MMOL/L (ref 3–16)
BUN BLDV-MCNC: 13 MG/DL (ref 7–20)
CALCIUM SERPL-MCNC: 9 MG/DL (ref 8.3–10.6)
CHLORIDE BLD-SCNC: 98 MMOL/L (ref 99–110)
CO2: 31 MMOL/L (ref 21–32)
CREAT SERPL-MCNC: 1.8 MG/DL (ref 0.8–1.3)
GFR AFRICAN AMERICAN: 43
GFR NON-AFRICAN AMERICAN: 36
GLUCOSE BLD-MCNC: 149 MG/DL (ref 70–99)
INTERNATIONAL NORMALIZATION RATIO, POC: 2.8
PHOSPHORUS: 3.3 MG/DL (ref 2.5–4.9)
POTASSIUM SERPL-SCNC: 3.4 MMOL/L (ref 3.5–5.1)
SODIUM BLD-SCNC: 142 MMOL/L (ref 136–145)

## 2019-01-11 PROCEDURE — 99211 OFF/OP EST MAY X REQ PHY/QHP: CPT

## 2019-01-11 PROCEDURE — 85610 PROTHROMBIN TIME: CPT

## 2019-01-13 ENCOUNTER — APPOINTMENT (OUTPATIENT)
Dept: GENERAL RADIOLOGY | Age: 84
End: 2019-01-13
Payer: MEDICARE

## 2019-01-13 ENCOUNTER — HOSPITAL ENCOUNTER (EMERGENCY)
Age: 84
Discharge: HOME | End: 2019-01-13
Attending: EMERGENCY MEDICINE
Payer: MEDICARE

## 2019-01-13 VITALS
DIASTOLIC BLOOD PRESSURE: 92 MMHG | RESPIRATION RATE: 15 BRPM | SYSTOLIC BLOOD PRESSURE: 155 MMHG | OXYGEN SATURATION: 95 % | HEIGHT: 70 IN | HEART RATE: 71 BPM | TEMPERATURE: 98 F | BODY MASS INDEX: 28.63 KG/M2 | WEIGHT: 200 LBS

## 2019-01-13 DIAGNOSIS — M79.89 LEG SWELLING: Primary | ICD-10-CM

## 2019-01-13 LAB
ANION GAP SERPL CALCULATED.3IONS-SCNC: 11 MMOL/L (ref 3–16)
BASOPHILS ABSOLUTE: 0.1 K/UL (ref 0–0.2)
BASOPHILS RELATIVE PERCENT: 1.1 %
BUN BLDV-MCNC: 12 MG/DL (ref 7–20)
CALCIUM SERPL-MCNC: 8.2 MG/DL (ref 8.3–10.6)
CHLORIDE BLD-SCNC: 95 MMOL/L (ref 99–110)
CO2: 29 MMOL/L (ref 21–32)
CREAT SERPL-MCNC: 1.5 MG/DL (ref 0.8–1.3)
EOSINOPHILS ABSOLUTE: 0.1 K/UL (ref 0–0.6)
EOSINOPHILS RELATIVE PERCENT: 1 %
GFR AFRICAN AMERICAN: 54
GFR NON-AFRICAN AMERICAN: 44
GLUCOSE BLD-MCNC: 133 MG/DL (ref 70–99)
HCT VFR BLD CALC: 37 % (ref 40.5–52.5)
HEMOGLOBIN: 12.9 G/DL (ref 13.5–17.5)
LYMPHOCYTES ABSOLUTE: 1.9 K/UL (ref 1–5.1)
LYMPHOCYTES RELATIVE PERCENT: 21.5 %
MCH RBC QN AUTO: 30.9 PG (ref 26–34)
MCHC RBC AUTO-ENTMCNC: 35 G/DL (ref 31–36)
MCV RBC AUTO: 88.2 FL (ref 80–100)
MONOCYTES ABSOLUTE: 0.9 K/UL (ref 0–1.3)
MONOCYTES RELATIVE PERCENT: 10.3 %
NEUTROPHILS ABSOLUTE: 5.9 K/UL (ref 1.7–7.7)
NEUTROPHILS RELATIVE PERCENT: 66.1 %
PDW BLD-RTO: 15.3 % (ref 12.4–15.4)
PLATELET # BLD: 284 K/UL (ref 135–450)
PMV BLD AUTO: 9.2 FL (ref 5–10.5)
POTASSIUM REFLEX MAGNESIUM: 3.9 MMOL/L (ref 3.5–5.1)
PRO-BNP: 185 PG/ML (ref 0–449)
RBC # BLD: 4.19 M/UL (ref 4.2–5.9)
SODIUM BLD-SCNC: 135 MMOL/L (ref 136–145)
TROPONIN: 0.01 NG/ML
TROPONIN: 0.02 NG/ML
WBC # BLD: 9 K/UL (ref 4–11)

## 2019-01-13 PROCEDURE — 84484 ASSAY OF TROPONIN QUANT: CPT

## 2019-01-13 PROCEDURE — 80048 BASIC METABOLIC PNL TOTAL CA: CPT

## 2019-01-13 PROCEDURE — 71046 X-RAY EXAM CHEST 2 VIEWS: CPT

## 2019-01-13 PROCEDURE — 85025 COMPLETE CBC W/AUTO DIFF WBC: CPT

## 2019-01-13 PROCEDURE — 83880 ASSAY OF NATRIURETIC PEPTIDE: CPT

## 2019-01-13 PROCEDURE — 93005 ELECTROCARDIOGRAM TRACING: CPT | Performed by: STUDENT IN AN ORGANIZED HEALTH CARE EDUCATION/TRAINING PROGRAM

## 2019-01-13 PROCEDURE — 99285 EMERGENCY DEPT VISIT HI MDM: CPT

## 2019-01-14 ENCOUNTER — OFFICE VISIT (OUTPATIENT)
Dept: CARDIOLOGY CLINIC | Age: 84
End: 2019-01-14
Payer: MEDICARE

## 2019-01-14 ENCOUNTER — TELEPHONE (OUTPATIENT)
Dept: OTHER | Facility: CLINIC | Age: 84
End: 2019-01-14

## 2019-01-14 VITALS
DIASTOLIC BLOOD PRESSURE: 74 MMHG | SYSTOLIC BLOOD PRESSURE: 136 MMHG | BODY MASS INDEX: 28.98 KG/M2 | HEART RATE: 86 BPM | WEIGHT: 202 LBS

## 2019-01-14 DIAGNOSIS — I50.32 CHRONIC DIASTOLIC HEART FAILURE (HCC): Primary | ICD-10-CM

## 2019-01-14 DIAGNOSIS — I48.0 PAROXYSMAL ATRIAL FIBRILLATION (HCC): ICD-10-CM

## 2019-01-14 DIAGNOSIS — R79.89 ELEVATED SERUM CREATININE: ICD-10-CM

## 2019-01-14 DIAGNOSIS — I10 ESSENTIAL HYPERTENSION, BENIGN: ICD-10-CM

## 2019-01-14 PROBLEM — I50.9 CHF WITH UNKNOWN LVEF (HCC): Status: RESOLVED | Noted: 2018-12-26 | Resolved: 2019-01-14

## 2019-01-14 LAB
EKG ATRIAL RATE: 60 BPM
EKG DIAGNOSIS: NORMAL
EKG P AXIS: 49 DEGREES
EKG P-R INTERVAL: 226 MS
EKG Q-T INTERVAL: 450 MS
EKG QRS DURATION: 144 MS
EKG QTC CALCULATION (BAZETT): 450 MS
EKG R AXIS: -22 DEGREES
EKG T AXIS: 29 DEGREES
EKG VENTRICULAR RATE: 60 BPM

## 2019-01-14 PROCEDURE — 99214 OFFICE O/P EST MOD 30 MIN: CPT | Performed by: NURSE PRACTITIONER

## 2019-01-14 RX ORDER — SPIRONOLACTONE 25 MG/1
12.5 TABLET ORAL DAILY
Qty: 30 TABLET | Refills: 3 | Status: SHIPPED | OUTPATIENT
Start: 2019-01-14 | End: 2019-08-11 | Stop reason: SDUPTHER

## 2019-01-14 RX ORDER — FUROSEMIDE 40 MG/1
40 TABLET ORAL DAILY
Qty: 60 TABLET | Refills: 5 | Status: SHIPPED | OUTPATIENT
Start: 2019-01-14 | End: 2019-12-04 | Stop reason: SDUPTHER

## 2019-01-14 ASSESSMENT — ENCOUNTER SYMPTOMS
COUGH: 0
GASTROINTESTINAL NEGATIVE: 1
WHEEZING: 0
SHORTNESS OF BREATH: 1

## 2019-01-21 DIAGNOSIS — I10 ESSENTIAL HYPERTENSION, BENIGN: ICD-10-CM

## 2019-01-22 LAB — PRO-BNP: 279 PG/ML (ref 0–449)

## 2019-01-25 ENCOUNTER — ANTI-COAG VISIT (OUTPATIENT)
Dept: PHARMACY | Age: 84
End: 2019-01-25
Payer: MEDICARE

## 2019-01-25 DIAGNOSIS — I48.92 ATRIAL FLUTTER, UNSPECIFIED TYPE (HCC): ICD-10-CM

## 2019-01-25 DIAGNOSIS — I48.0 PAROXYSMAL ATRIAL FIBRILLATION (HCC): ICD-10-CM

## 2019-01-25 LAB — INTERNATIONAL NORMALIZATION RATIO, POC: 3.1

## 2019-01-25 PROCEDURE — 85610 PROTHROMBIN TIME: CPT

## 2019-01-25 PROCEDURE — 99211 OFF/OP EST MAY X REQ PHY/QHP: CPT

## 2019-02-04 ENCOUNTER — OFFICE VISIT (OUTPATIENT)
Dept: CARDIOLOGY CLINIC | Age: 84
End: 2019-02-04
Payer: MEDICARE

## 2019-02-04 ENCOUNTER — ANTI-COAG VISIT (OUTPATIENT)
Dept: PHARMACY | Age: 84
End: 2019-02-04
Payer: MEDICARE

## 2019-02-04 VITALS
BODY MASS INDEX: 28.7 KG/M2 | DIASTOLIC BLOOD PRESSURE: 60 MMHG | SYSTOLIC BLOOD PRESSURE: 128 MMHG | WEIGHT: 200 LBS | HEART RATE: 72 BPM

## 2019-02-04 DIAGNOSIS — I48.0 PAROXYSMAL ATRIAL FIBRILLATION (HCC): ICD-10-CM

## 2019-02-04 DIAGNOSIS — I48.92 ATRIAL FLUTTER, UNSPECIFIED TYPE (HCC): ICD-10-CM

## 2019-02-04 DIAGNOSIS — I50.32 CHRONIC DIASTOLIC HEART FAILURE (HCC): ICD-10-CM

## 2019-02-04 DIAGNOSIS — I10 ESSENTIAL HYPERTENSION, BENIGN: Primary | ICD-10-CM

## 2019-02-04 LAB — INTERNATIONAL NORMALIZATION RATIO, POC: 2.4

## 2019-02-04 PROCEDURE — 85610 PROTHROMBIN TIME: CPT

## 2019-02-04 PROCEDURE — 99214 OFFICE O/P EST MOD 30 MIN: CPT | Performed by: INTERNAL MEDICINE

## 2019-02-04 PROCEDURE — 99211 OFF/OP EST MAY X REQ PHY/QHP: CPT

## 2019-02-04 RX ORDER — WARFARIN SODIUM 2 MG/1
TABLET ORAL
Qty: 30 TABLET | Refills: 3 | Status: ON HOLD | OUTPATIENT
Start: 2019-02-04 | End: 2019-02-24 | Stop reason: CLARIF

## 2019-02-04 RX ORDER — WARFARIN SODIUM 5 MG/1
5 TABLET ORAL DAILY
Qty: 90 TABLET | Refills: 3 | Status: ON HOLD | OUTPATIENT
Start: 2019-02-04 | End: 2019-03-02 | Stop reason: HOSPADM

## 2019-02-11 ENCOUNTER — TELEPHONE (OUTPATIENT)
Dept: INTERNAL MEDICINE CLINIC | Age: 84
End: 2019-02-11

## 2019-02-12 ENCOUNTER — HOSPITAL ENCOUNTER (EMERGENCY)
Age: 84
Discharge: HOME | End: 2019-02-12
Attending: EMERGENCY MEDICINE
Payer: MEDICARE

## 2019-02-12 ENCOUNTER — APPOINTMENT (OUTPATIENT)
Dept: GENERAL RADIOLOGY | Age: 84
End: 2019-02-12
Payer: MEDICARE

## 2019-02-12 ENCOUNTER — APPOINTMENT (OUTPATIENT)
Dept: VASCULAR LAB | Age: 84
End: 2019-02-12
Payer: MEDICARE

## 2019-02-12 VITALS
DIASTOLIC BLOOD PRESSURE: 85 MMHG | HEIGHT: 71 IN | HEART RATE: 71 BPM | WEIGHT: 200 LBS | OXYGEN SATURATION: 91 % | RESPIRATION RATE: 16 BRPM | TEMPERATURE: 98.2 F | BODY MASS INDEX: 28 KG/M2 | SYSTOLIC BLOOD PRESSURE: 145 MMHG

## 2019-02-12 DIAGNOSIS — M25.551 RIGHT HIP PAIN: Primary | ICD-10-CM

## 2019-02-12 DIAGNOSIS — I73.9 PERIPHERAL ARTERY DISEASE (HCC): ICD-10-CM

## 2019-02-12 LAB
ANION GAP SERPL CALCULATED.3IONS-SCNC: 15 MMOL/L (ref 3–16)
BASOPHILS ABSOLUTE: 0 K/UL (ref 0–0.2)
BASOPHILS RELATIVE PERCENT: 0.5 %
BUN BLDV-MCNC: 24 MG/DL (ref 7–20)
CALCIUM SERPL-MCNC: 8.5 MG/DL (ref 8.3–10.6)
CHLORIDE BLD-SCNC: 98 MMOL/L (ref 99–110)
CO2: 28 MMOL/L (ref 21–32)
CREAT SERPL-MCNC: 2 MG/DL (ref 0.8–1.3)
EKG ATRIAL RATE: 66 BPM
EKG DIAGNOSIS: NORMAL
EKG P AXIS: 90 DEGREES
EKG P-R INTERVAL: 212 MS
EKG Q-T INTERVAL: 452 MS
EKG QRS DURATION: 124 MS
EKG QTC CALCULATION (BAZETT): 473 MS
EKG R AXIS: 21 DEGREES
EKG T AXIS: 32 DEGREES
EKG VENTRICULAR RATE: 66 BPM
EOSINOPHILS ABSOLUTE: 0.1 K/UL (ref 0–0.6)
EOSINOPHILS RELATIVE PERCENT: 1.2 %
GFR AFRICAN AMERICAN: 38
GFR NON-AFRICAN AMERICAN: 32
GLUCOSE BLD-MCNC: 106 MG/DL (ref 70–99)
HCT VFR BLD CALC: 36.1 % (ref 40.5–52.5)
HEMOGLOBIN: 12 G/DL (ref 13.5–17.5)
INR BLD: 3.65 (ref 0.86–1.14)
LYMPHOCYTES ABSOLUTE: 1.6 K/UL (ref 1–5.1)
LYMPHOCYTES RELATIVE PERCENT: 28.5 %
MCH RBC QN AUTO: 30.3 PG (ref 26–34)
MCHC RBC AUTO-ENTMCNC: 33.1 G/DL (ref 31–36)
MCV RBC AUTO: 91.4 FL (ref 80–100)
MONOCYTES ABSOLUTE: 1.2 K/UL (ref 0–1.3)
MONOCYTES RELATIVE PERCENT: 22.2 %
NEUTROPHILS ABSOLUTE: 2.6 K/UL (ref 1.7–7.7)
NEUTROPHILS RELATIVE PERCENT: 47.6 %
PDW BLD-RTO: 15.5 % (ref 12.4–15.4)
PLATELET # BLD: 250 K/UL (ref 135–450)
PMV BLD AUTO: 8.6 FL (ref 5–10.5)
POTASSIUM SERPL-SCNC: 3.5 MMOL/L (ref 3.5–5.1)
PRO-BNP: 250 PG/ML (ref 0–449)
PROTHROMBIN TIME: 41.6 SEC (ref 9.8–13)
RBC # BLD: 3.95 M/UL (ref 4.2–5.9)
SODIUM BLD-SCNC: 141 MMOL/L (ref 136–145)
WBC # BLD: 5.5 K/UL (ref 4–11)

## 2019-02-12 PROCEDURE — 83880 ASSAY OF NATRIURETIC PEPTIDE: CPT

## 2019-02-12 PROCEDURE — 93005 ELECTROCARDIOGRAM TRACING: CPT | Performed by: PHYSICIAN ASSISTANT

## 2019-02-12 PROCEDURE — 93971 EXTREMITY STUDY: CPT

## 2019-02-12 PROCEDURE — 85610 PROTHROMBIN TIME: CPT

## 2019-02-12 PROCEDURE — 99285 EMERGENCY DEPT VISIT HI MDM: CPT

## 2019-02-12 PROCEDURE — 85025 COMPLETE CBC W/AUTO DIFF WBC: CPT

## 2019-02-12 PROCEDURE — 73502 X-RAY EXAM HIP UNI 2-3 VIEWS: CPT

## 2019-02-12 PROCEDURE — 80048 BASIC METABOLIC PNL TOTAL CA: CPT

## 2019-02-12 PROCEDURE — 93923 UPR/LXTR ART STDY 3+ LVLS: CPT

## 2019-02-12 PROCEDURE — 71046 X-RAY EXAM CHEST 2 VIEWS: CPT

## 2019-02-12 PROCEDURE — 6370000000 HC RX 637 (ALT 250 FOR IP): Performed by: PHYSICIAN ASSISTANT

## 2019-02-12 RX ORDER — TRAMADOL HYDROCHLORIDE 50 MG/1
50 TABLET ORAL ONCE
Status: COMPLETED | OUTPATIENT
Start: 2019-02-12 | End: 2019-02-12

## 2019-02-12 RX ORDER — TRAMADOL HYDROCHLORIDE 50 MG/1
50 TABLET ORAL EVERY 4 HOURS PRN
Qty: 10 TABLET | Refills: 0 | Status: SHIPPED | OUTPATIENT
Start: 2019-02-12 | End: 2019-02-15

## 2019-02-12 RX ORDER — ACETAMINOPHEN 325 MG/1
650 TABLET ORAL ONCE
Status: DISCONTINUED | OUTPATIENT
Start: 2019-02-12 | End: 2019-02-12 | Stop reason: HOSPADM

## 2019-02-12 RX ADMIN — TRAMADOL HYDROCHLORIDE 50 MG: 50 TABLET, FILM COATED ORAL at 11:40

## 2019-02-12 RX ADMIN — TRAMADOL HYDROCHLORIDE 50 MG: 50 TABLET, FILM COATED ORAL at 15:53

## 2019-02-12 ASSESSMENT — PAIN DESCRIPTION - PAIN TYPE: TYPE: ACUTE PAIN

## 2019-02-12 ASSESSMENT — PAIN SCALES - GENERAL
PAINLEVEL_OUTOF10: 7
PAINLEVEL_OUTOF10: 7
PAINLEVEL_OUTOF10: 10
PAINLEVEL_OUTOF10: 5

## 2019-02-12 ASSESSMENT — ENCOUNTER SYMPTOMS
SHORTNESS OF BREATH: 1
BACK PAIN: 0
COUGH: 0
RHINORRHEA: 0
ABDOMINAL PAIN: 0

## 2019-02-12 ASSESSMENT — PAIN DESCRIPTION - ONSET: ONSET: AWAKENED FROM SLEEP

## 2019-02-12 ASSESSMENT — PAIN - FUNCTIONAL ASSESSMENT: PAIN_FUNCTIONAL_ASSESSMENT: 0-10

## 2019-02-12 ASSESSMENT — PAIN DESCRIPTION - ORIENTATION: ORIENTATION: RIGHT

## 2019-02-12 ASSESSMENT — PAIN DESCRIPTION - FREQUENCY: FREQUENCY: CONTINUOUS

## 2019-02-13 ENCOUNTER — TELEPHONE (OUTPATIENT)
Dept: VASCULAR SURGERY | Age: 84
End: 2019-02-13

## 2019-02-13 ENCOUNTER — TELEPHONE (OUTPATIENT)
Dept: SURGERY | Age: 84
End: 2019-02-13

## 2019-02-14 ENCOUNTER — TELEPHONE (OUTPATIENT)
Dept: SURGERY | Age: 84
End: 2019-02-14

## 2019-02-14 ENCOUNTER — TELEPHONE (OUTPATIENT)
Dept: CARDIOLOGY CLINIC | Age: 84
End: 2019-02-14

## 2019-02-15 ENCOUNTER — TELEPHONE (OUTPATIENT)
Dept: INTERNAL MEDICINE CLINIC | Age: 84
End: 2019-02-15

## 2019-02-15 NOTE — TELEPHONE ENCOUNTER
Pt has blood INR done regularly , I want him to be current in the system so he can get his blood test in the lab of the office. Do you need to get things set up with his cardiology for him to come be tested in the office?      or we just need to show up for the test ?    Pt is taking these two blood thiner:    warfarin (COUMADIN) 5 MG tablet [[id number]    warfarin (COUMADIN) 2 MG tablet [[id number]    Daughter is Sylvester Spann phone # [phone]

## 2019-02-18 NOTE — TELEPHONE ENCOUNTER
Called  The daughter  Back  He just wants  To have it done at  The cardiologist   / [facility]  [facility]  Wrote  The orders and he  Told him he could go to any [facility] to have drawn  Cardiologist is following him on this

## 2019-02-19 DIAGNOSIS — I48.0 PAROXYSMAL ATRIAL FIBRILLATION (HCC): ICD-10-CM

## 2019-02-19 DIAGNOSIS — I48.3 TYPICAL ATRIAL FLUTTER (HCC): ICD-10-CM

## 2019-02-19 LAB
INR BLD: 4.75 (ref 0.86–1.14)
PROTHROMBIN TIME: 54.1 SEC (ref 9.8–13)

## 2019-02-20 ENCOUNTER — TELEPHONE (OUTPATIENT)
Dept: PHARMACY | Age: 84
End: 2019-02-20

## 2019-02-20 ENCOUNTER — ANTI-COAG VISIT (OUTPATIENT)
Dept: CARDIOLOGY CLINIC | Age: 84
End: 2019-02-20
Payer: MEDICARE

## 2019-02-20 ENCOUNTER — TELEPHONE (OUTPATIENT)
Dept: CARDIOLOGY CLINIC | Age: 84
End: 2019-02-20

## 2019-02-20 DIAGNOSIS — I48.91 ATRIAL FIBRILLATION, UNSPECIFIED TYPE (HCC): Primary | ICD-10-CM

## 2019-02-20 DIAGNOSIS — I48.91 ATRIAL FIBRILLATION, UNSPECIFIED TYPE (HCC): ICD-10-CM

## 2019-02-20 DIAGNOSIS — I48.92 ATRIAL FLUTTER, UNSPECIFIED TYPE (HCC): ICD-10-CM

## 2019-02-20 PROCEDURE — 93793 ANTICOAG MGMT PT WARFARIN: CPT | Performed by: NURSE PRACTITIONER

## 2019-02-24 ENCOUNTER — APPOINTMENT (OUTPATIENT)
Dept: GENERAL RADIOLOGY | Age: 84
DRG: 074 | End: 2019-02-24
Payer: MEDICARE

## 2019-02-24 ENCOUNTER — HOSPITAL ENCOUNTER (INPATIENT)
Age: 84
LOS: 6 days | Discharge: HOME HEALTH | DRG: 074 | End: 2019-03-02
Attending: EMERGENCY MEDICINE | Admitting: INTERNAL MEDICINE
Payer: MEDICARE

## 2019-02-24 DIAGNOSIS — E83.42 HYPOMAGNESEMIA: ICD-10-CM

## 2019-02-24 DIAGNOSIS — R79.1 SUPRATHERAPEUTIC INR: ICD-10-CM

## 2019-02-24 DIAGNOSIS — R53.1 GENERALIZED WEAKNESS: Primary | ICD-10-CM

## 2019-02-24 PROBLEM — R11.2 NAUSEA & VOMITING: Status: ACTIVE | Noted: 2019-02-24

## 2019-02-24 LAB
A/G RATIO: 1 (ref 1.1–2.2)
ALBUMIN SERPL-MCNC: 2.7 G/DL (ref 3.4–5)
ALP BLD-CCNC: 72 U/L (ref 40–129)
ALT SERPL-CCNC: 14 U/L (ref 10–40)
ANION GAP SERPL CALCULATED.3IONS-SCNC: 14 MMOL/L (ref 3–16)
AST SERPL-CCNC: 19 U/L (ref 15–37)
BASOPHILS ABSOLUTE: 0 K/UL (ref 0–0.2)
BASOPHILS RELATIVE PERCENT: 0.2 %
BILIRUB SERPL-MCNC: 1 MG/DL (ref 0–1)
BUN BLDV-MCNC: 27 MG/DL (ref 7–20)
C-REACTIVE PROTEIN: 63.2 MG/L (ref 0–5.1)
CALCIUM SERPL-MCNC: 8.2 MG/DL (ref 8.3–10.6)
CHLORIDE BLD-SCNC: 95 MMOL/L (ref 99–110)
CO2: 26 MMOL/L (ref 21–32)
CREAT SERPL-MCNC: 1.8 MG/DL (ref 0.8–1.3)
EOSINOPHILS ABSOLUTE: 0 K/UL (ref 0–0.6)
EOSINOPHILS RELATIVE PERCENT: 0.1 %
GFR AFRICAN AMERICAN: 43
GFR NON-AFRICAN AMERICAN: 36
GLOBULIN: 2.8 G/DL
GLUCOSE BLD-MCNC: 118 MG/DL (ref 70–99)
HCT VFR BLD CALC: 37 % (ref 40.5–52.5)
HCT VFR BLD CALC: 40.6 % (ref 40.5–52.5)
HEMOGLOBIN: 12.2 G/DL (ref 13.5–17.5)
HEMOGLOBIN: 13.4 G/DL (ref 13.5–17.5)
INR BLD: 5.48 (ref 0.86–1.14)
LACTIC ACID: 1.7 MMOL/L (ref 0.4–2)
LYMPHOCYTES ABSOLUTE: 1.1 K/UL (ref 1–5.1)
LYMPHOCYTES RELATIVE PERCENT: 6.4 %
MAGNESIUM: 1.4 MG/DL (ref 1.8–2.4)
MCH RBC QN AUTO: 29.5 PG (ref 26–34)
MCH RBC QN AUTO: 29.7 PG (ref 26–34)
MCHC RBC AUTO-ENTMCNC: 32.9 G/DL (ref 31–36)
MCHC RBC AUTO-ENTMCNC: 33.1 G/DL (ref 31–36)
MCV RBC AUTO: 89.6 FL (ref 80–100)
MCV RBC AUTO: 89.8 FL (ref 80–100)
MONOCYTES ABSOLUTE: 1.4 K/UL (ref 0–1.3)
MONOCYTES RELATIVE PERCENT: 7.8 %
NEUTROPHILS ABSOLUTE: 15.2 K/UL (ref 1.7–7.7)
NEUTROPHILS RELATIVE PERCENT: 85.5 %
PDW BLD-RTO: 15.7 % (ref 12.4–15.4)
PDW BLD-RTO: 15.7 % (ref 12.4–15.4)
PLATELET # BLD: 310 K/UL (ref 135–450)
PLATELET # BLD: 350 K/UL (ref 135–450)
PMV BLD AUTO: 8.5 FL (ref 5–10.5)
PMV BLD AUTO: 9.1 FL (ref 5–10.5)
POTASSIUM REFLEX MAGNESIUM: 3.5 MMOL/L (ref 3.5–5.1)
PRO-BNP: 437 PG/ML (ref 0–449)
PROTHROMBIN TIME: 62.5 SEC (ref 9.8–13)
RBC # BLD: 4.13 M/UL (ref 4.2–5.9)
RBC # BLD: 4.52 M/UL (ref 4.2–5.9)
SEDIMENTATION RATE, ERYTHROCYTE: 11 MM/HR (ref 0–20)
SODIUM BLD-SCNC: 135 MMOL/L (ref 136–145)
TOTAL PROTEIN: 5.5 G/DL (ref 6.4–8.2)
TROPONIN: 0.01 NG/ML
URIC ACID, SERUM: 6.4 MG/DL (ref 3.5–7.2)
WBC # BLD: 14 K/UL (ref 4–11)
WBC # BLD: 17.7 K/UL (ref 4–11)

## 2019-02-24 PROCEDURE — 93005 ELECTROCARDIOGRAM TRACING: CPT | Performed by: STUDENT IN AN ORGANIZED HEALTH CARE EDUCATION/TRAINING PROGRAM

## 2019-02-24 PROCEDURE — 85652 RBC SED RATE AUTOMATED: CPT

## 2019-02-24 PROCEDURE — 83735 ASSAY OF MAGNESIUM: CPT

## 2019-02-24 PROCEDURE — 84550 ASSAY OF BLOOD/URIC ACID: CPT

## 2019-02-24 PROCEDURE — 85027 COMPLETE CBC AUTOMATED: CPT

## 2019-02-24 PROCEDURE — 6370000000 HC RX 637 (ALT 250 FOR IP): Performed by: INTERNAL MEDICINE

## 2019-02-24 PROCEDURE — 73630 X-RAY EXAM OF FOOT: CPT

## 2019-02-24 PROCEDURE — C9113 INJ PANTOPRAZOLE SODIUM, VIA: HCPCS | Performed by: STUDENT IN AN ORGANIZED HEALTH CARE EDUCATION/TRAINING PROGRAM

## 2019-02-24 PROCEDURE — 83880 ASSAY OF NATRIURETIC PEPTIDE: CPT

## 2019-02-24 PROCEDURE — 36415 COLL VENOUS BLD VENIPUNCTURE: CPT

## 2019-02-24 PROCEDURE — 99285 EMERGENCY DEPT VISIT HI MDM: CPT

## 2019-02-24 PROCEDURE — 2580000003 HC RX 258: Performed by: INTERNAL MEDICINE

## 2019-02-24 PROCEDURE — 84484 ASSAY OF TROPONIN QUANT: CPT

## 2019-02-24 PROCEDURE — 96374 THER/PROPH/DIAG INJ IV PUSH: CPT

## 2019-02-24 PROCEDURE — 6370000000 HC RX 637 (ALT 250 FOR IP): Performed by: STUDENT IN AN ORGANIZED HEALTH CARE EDUCATION/TRAINING PROGRAM

## 2019-02-24 PROCEDURE — 85610 PROTHROMBIN TIME: CPT

## 2019-02-24 PROCEDURE — 6360000002 HC RX W HCPCS: Performed by: STUDENT IN AN ORGANIZED HEALTH CARE EDUCATION/TRAINING PROGRAM

## 2019-02-24 PROCEDURE — 71046 X-RAY EXAM CHEST 2 VIEWS: CPT

## 2019-02-24 PROCEDURE — 83605 ASSAY OF LACTIC ACID: CPT

## 2019-02-24 PROCEDURE — 1200000000 HC SEMI PRIVATE

## 2019-02-24 PROCEDURE — 86140 C-REACTIVE PROTEIN: CPT

## 2019-02-24 PROCEDURE — 85025 COMPLETE CBC W/AUTO DIFF WBC: CPT

## 2019-02-24 PROCEDURE — 80053 COMPREHEN METABOLIC PANEL: CPT

## 2019-02-24 RX ORDER — ALLOPURINOL 300 MG/1
300 TABLET ORAL DAILY
Status: ON HOLD | COMMUNITY
End: 2019-03-02 | Stop reason: HOSPADM

## 2019-02-24 RX ORDER — FENTANYL CITRATE 50 UG/ML
25 INJECTION, SOLUTION INTRAMUSCULAR; INTRAVENOUS ONCE
Status: COMPLETED | OUTPATIENT
Start: 2019-02-24 | End: 2019-02-24

## 2019-02-24 RX ORDER — WARFARIN SODIUM 5 MG/1
2.5-5 TABLET ORAL
Status: ON HOLD | COMMUNITY
End: 2019-03-02 | Stop reason: HOSPADM

## 2019-02-24 RX ORDER — PANTOPRAZOLE SODIUM 40 MG/10ML
40 INJECTION, POWDER, LYOPHILIZED, FOR SOLUTION INTRAVENOUS ONCE
Status: COMPLETED | OUTPATIENT
Start: 2019-02-24 | End: 2019-02-24

## 2019-02-24 RX ORDER — COLCHICINE 0.6 MG/1
0.6 TABLET ORAL DAILY
Status: ON HOLD | COMMUNITY
End: 2019-03-02 | Stop reason: HOSPADM

## 2019-02-24 RX ORDER — MAGNESIUM SULFATE IN WATER 40 MG/ML
4 INJECTION, SOLUTION INTRAVENOUS ONCE
Status: DISCONTINUED | OUTPATIENT
Start: 2019-02-24 | End: 2019-02-24

## 2019-02-24 RX ORDER — CEPHALEXIN 250 MG/1
250 CAPSULE ORAL EVERY 6 HOURS SCHEDULED
Status: DISCONTINUED | OUTPATIENT
Start: 2019-02-24 | End: 2019-02-24

## 2019-02-24 RX ORDER — ATORVASTATIN CALCIUM 40 MG/1
40 TABLET, FILM COATED ORAL NIGHTLY
COMMUNITY
End: 2020-01-10 | Stop reason: DRUGHIGH

## 2019-02-24 RX ORDER — SODIUM CHLORIDE 0.9 % (FLUSH) 0.9 %
10 SYRINGE (ML) INJECTION EVERY 12 HOURS SCHEDULED
Status: DISCONTINUED | OUTPATIENT
Start: 2019-02-24 | End: 2019-03-02 | Stop reason: HOSPADM

## 2019-02-24 RX ORDER — ONDANSETRON 2 MG/ML
4 INJECTION INTRAMUSCULAR; INTRAVENOUS EVERY 6 HOURS PRN
Status: DISCONTINUED | OUTPATIENT
Start: 2019-02-24 | End: 2019-03-02 | Stop reason: HOSPADM

## 2019-02-24 RX ORDER — SODIUM CHLORIDE 0.9 % (FLUSH) 0.9 %
10 SYRINGE (ML) INJECTION PRN
Status: DISCONTINUED | OUTPATIENT
Start: 2019-02-24 | End: 2019-03-02 | Stop reason: HOSPADM

## 2019-02-24 RX ORDER — ATORVASTATIN CALCIUM 40 MG/1
40 TABLET, FILM COATED ORAL NIGHTLY
Status: DISCONTINUED | OUTPATIENT
Start: 2019-02-24 | End: 2019-03-02 | Stop reason: HOSPADM

## 2019-02-24 RX ORDER — LANOLIN ALCOHOL/MO/W.PET/CERES
CREAM (GRAM) TOPICAL 2 TIMES DAILY
Status: DISCONTINUED | OUTPATIENT
Start: 2019-02-24 | End: 2019-03-02 | Stop reason: HOSPADM

## 2019-02-24 RX ORDER — LIDOCAINE HYDROCHLORIDE 10 MG/ML
5 INJECTION, SOLUTION EPIDURAL; INFILTRATION; INTRACAUDAL; PERINEURAL ONCE
Status: DISCONTINUED | OUTPATIENT
Start: 2019-02-24 | End: 2019-02-24

## 2019-02-24 RX ORDER — MAGNESIUM SULFATE IN WATER 40 MG/ML
2 INJECTION, SOLUTION INTRAVENOUS ONCE
Status: COMPLETED | OUTPATIENT
Start: 2019-02-24 | End: 2019-02-24

## 2019-02-24 RX ORDER — SODIUM CHLORIDE 9 MG/ML
INJECTION, SOLUTION INTRAVENOUS CONTINUOUS
Status: DISCONTINUED | OUTPATIENT
Start: 2019-02-24 | End: 2019-02-26

## 2019-02-24 RX ORDER — ATORVASTATIN CALCIUM 40 MG/1
80 TABLET, FILM COATED ORAL DAILY
Status: DISCONTINUED | OUTPATIENT
Start: 2019-02-24 | End: 2019-02-24

## 2019-02-24 RX ORDER — AMIODARONE HYDROCHLORIDE 200 MG/1
200 TABLET ORAL DAILY
Status: DISCONTINUED | OUTPATIENT
Start: 2019-02-24 | End: 2019-03-02 | Stop reason: HOSPADM

## 2019-02-24 RX ORDER — BACITRACIN ZINC AND POLYMYXIN B SULFATE 500; 1000 [USP'U]/G; [USP'U]/G
OINTMENT TOPICAL 2 TIMES DAILY
Status: DISCONTINUED | OUTPATIENT
Start: 2019-02-24 | End: 2019-03-02

## 2019-02-24 RX ORDER — ACETAMINOPHEN 325 MG/1
650 TABLET ORAL EVERY 4 HOURS PRN
Status: DISCONTINUED | OUTPATIENT
Start: 2019-02-24 | End: 2019-03-02 | Stop reason: HOSPADM

## 2019-02-24 RX ADMIN — MAGNESIUM SULFATE HEPTAHYDRATE 2 G: 40 INJECTION, SOLUTION INTRAVENOUS at 16:33

## 2019-02-24 RX ADMIN — LIDOCAINE HYDROCHLORIDE: 20 SOLUTION ORAL; TOPICAL at 14:07

## 2019-02-24 RX ADMIN — AMIODARONE HYDROCHLORIDE 200 MG: 200 TABLET ORAL at 23:22

## 2019-02-24 RX ADMIN — ATORVASTATIN CALCIUM 40 MG: 40 TABLET, FILM COATED ORAL at 23:22

## 2019-02-24 RX ADMIN — DILTIAZEM HYDROCHLORIDE 300 MG: 180 CAPSULE, COATED, EXTENDED RELEASE ORAL at 23:22

## 2019-02-24 RX ADMIN — PANTOPRAZOLE SODIUM 40 MG: 40 INJECTION, POWDER, FOR SOLUTION INTRAVENOUS at 14:17

## 2019-02-24 RX ADMIN — FENTANYL CITRATE 25 MCG: 50 INJECTION INTRAMUSCULAR; INTRAVENOUS at 15:58

## 2019-02-24 RX ADMIN — SODIUM CHLORIDE: 900 INJECTION, SOLUTION INTRAVENOUS at 18:08

## 2019-02-24 ASSESSMENT — PAIN SCALES - GENERAL
PAINLEVEL_OUTOF10: 10
PAINLEVEL_OUTOF10: 0
PAINLEVEL_OUTOF10: 10
PAINLEVEL_OUTOF10: 8

## 2019-02-24 ASSESSMENT — PAIN DESCRIPTION - DESCRIPTORS: DESCRIPTORS: ACHING

## 2019-02-24 ASSESSMENT — PAIN - FUNCTIONAL ASSESSMENT: PAIN_FUNCTIONAL_ASSESSMENT: PREVENTS OR INTERFERES SOME ACTIVE ACTIVITIES AND ADLS

## 2019-02-24 ASSESSMENT — PAIN DESCRIPTION - LOCATION
LOCATION: FOOT
LOCATION: LEG;FOOT

## 2019-02-24 ASSESSMENT — PAIN DESCRIPTION - PAIN TYPE: TYPE: ACUTE PAIN

## 2019-02-24 ASSESSMENT — PAIN DESCRIPTION - ORIENTATION
ORIENTATION: RIGHT
ORIENTATION: RIGHT

## 2019-02-24 ASSESSMENT — PAIN DESCRIPTION - ONSET: ONSET: ON-GOING

## 2019-02-24 ASSESSMENT — PAIN DESCRIPTION - PROGRESSION: CLINICAL_PROGRESSION: NOT CHANGED

## 2019-02-24 ASSESSMENT — PAIN DESCRIPTION - FREQUENCY: FREQUENCY: CONTINUOUS

## 2019-02-24 ASSESSMENT — PAIN DESCRIPTION - DIRECTION: RADIATING_TOWARDS: FOOT

## 2019-02-25 ENCOUNTER — APPOINTMENT (OUTPATIENT)
Dept: MRI IMAGING | Age: 84
DRG: 074 | End: 2019-02-25
Payer: MEDICARE

## 2019-02-25 ENCOUNTER — TELEPHONE (OUTPATIENT)
Dept: CARDIOLOGY CLINIC | Age: 84
End: 2019-02-25

## 2019-02-25 ENCOUNTER — TELEPHONE (OUTPATIENT)
Dept: PHARMACY | Age: 84
End: 2019-02-25

## 2019-02-25 LAB
ANION GAP SERPL CALCULATED.3IONS-SCNC: 12 MMOL/L (ref 3–16)
BASOPHILS ABSOLUTE: 0 K/UL (ref 0–0.2)
BASOPHILS RELATIVE PERCENT: 0.3 %
BUN BLDV-MCNC: 25 MG/DL (ref 7–20)
C DIFFICILE TOXIN, EIA: NORMAL
CALCIUM SERPL-MCNC: 7.6 MG/DL (ref 8.3–10.6)
CHLORIDE BLD-SCNC: 102 MMOL/L (ref 99–110)
CO2: 27 MMOL/L (ref 21–32)
CREAT SERPL-MCNC: 1.5 MG/DL (ref 0.8–1.3)
EKG ATRIAL RATE: 83 BPM
EKG DIAGNOSIS: NORMAL
EKG P AXIS: 27 DEGREES
EKG P-R INTERVAL: 194 MS
EKG Q-T INTERVAL: 462 MS
EKG QRS DURATION: 142 MS
EKG QTC CALCULATION (BAZETT): 542 MS
EKG R AXIS: -42 DEGREES
EKG T AXIS: 8 DEGREES
EKG VENTRICULAR RATE: 83 BPM
EOSINOPHILS ABSOLUTE: 0 K/UL (ref 0–0.6)
EOSINOPHILS RELATIVE PERCENT: 0.2 %
GFR AFRICAN AMERICAN: 54
GFR NON-AFRICAN AMERICAN: 44
GLUCOSE BLD-MCNC: 108 MG/DL (ref 70–99)
HCT VFR BLD CALC: 35.5 % (ref 40.5–52.5)
HEMOGLOBIN: 11.8 G/DL (ref 13.5–17.5)
INR BLD: 6.99 (ref 0.86–1.14)
LYMPHOCYTES ABSOLUTE: 1.3 K/UL (ref 1–5.1)
LYMPHOCYTES RELATIVE PERCENT: 9.5 %
MAGNESIUM: 2 MG/DL (ref 1.8–2.4)
MCH RBC QN AUTO: 30 PG (ref 26–34)
MCHC RBC AUTO-ENTMCNC: 33.3 G/DL (ref 31–36)
MCV RBC AUTO: 90.1 FL (ref 80–100)
MONOCYTES ABSOLUTE: 1.3 K/UL (ref 0–1.3)
MONOCYTES RELATIVE PERCENT: 9.3 %
NEUTROPHILS ABSOLUTE: 11.1 K/UL (ref 1.7–7.7)
NEUTROPHILS RELATIVE PERCENT: 80.7 %
PDW BLD-RTO: 15.6 % (ref 12.4–15.4)
PLATELET # BLD: 269 K/UL (ref 135–450)
PMV BLD AUTO: 8.4 FL (ref 5–10.5)
POTASSIUM REFLEX MAGNESIUM: 3.5 MMOL/L (ref 3.5–5.1)
PROTHROMBIN TIME: 79.7 SEC (ref 9.8–13)
RBC # BLD: 3.95 M/UL (ref 4.2–5.9)
SODIUM BLD-SCNC: 141 MMOL/L (ref 136–145)
WBC # BLD: 13.8 K/UL (ref 4–11)

## 2019-02-25 PROCEDURE — 36415 COLL VENOUS BLD VENIPUNCTURE: CPT

## 2019-02-25 PROCEDURE — 6370000000 HC RX 637 (ALT 250 FOR IP): Performed by: INTERNAL MEDICINE

## 2019-02-25 PROCEDURE — 87493 C DIFF AMPLIFIED PROBE: CPT

## 2019-02-25 PROCEDURE — 83735 ASSAY OF MAGNESIUM: CPT

## 2019-02-25 PROCEDURE — 2580000003 HC RX 258: Performed by: INTERNAL MEDICINE

## 2019-02-25 PROCEDURE — 6370000000 HC RX 637 (ALT 250 FOR IP): Performed by: STUDENT IN AN ORGANIZED HEALTH CARE EDUCATION/TRAINING PROGRAM

## 2019-02-25 PROCEDURE — 87449 NOS EACH ORGANISM AG IA: CPT

## 2019-02-25 PROCEDURE — 87046 STOOL CULTR AEROBIC BACT EA: CPT

## 2019-02-25 PROCEDURE — 85025 COMPLETE CBC W/AUTO DIFF WBC: CPT

## 2019-02-25 PROCEDURE — 87505 NFCT AGENT DETECTION GI: CPT

## 2019-02-25 PROCEDURE — 85610 PROTHROMBIN TIME: CPT

## 2019-02-25 PROCEDURE — 87324 CLOSTRIDIUM AG IA: CPT

## 2019-02-25 PROCEDURE — 80048 BASIC METABOLIC PNL TOTAL CA: CPT

## 2019-02-25 PROCEDURE — 73718 MRI LOWER EXTREMITY W/O DYE: CPT

## 2019-02-25 PROCEDURE — 1200000000 HC SEMI PRIVATE

## 2019-02-25 RX ORDER — TRAMADOL HYDROCHLORIDE 50 MG/1
50 TABLET ORAL EVERY 8 HOURS PRN
Status: DISCONTINUED | OUTPATIENT
Start: 2019-02-25 | End: 2019-03-02 | Stop reason: HOSPADM

## 2019-02-25 RX ORDER — ASPIRIN 81 MG/1
81 TABLET, CHEWABLE ORAL DAILY
Status: DISCONTINUED | OUTPATIENT
Start: 2019-02-26 | End: 2019-03-02 | Stop reason: HOSPADM

## 2019-02-25 RX ORDER — GENTAMICIN SULFATE 1 MG/G
CREAM TOPICAL DAILY
Status: DISCONTINUED | OUTPATIENT
Start: 2019-02-25 | End: 2019-03-02 | Stop reason: HOSPADM

## 2019-02-25 RX ADMIN — BACITRACIN ZINC AND POLYMYXIN B SULFATE: 500; 10000 OINTMENT TOPICAL at 00:20

## 2019-02-25 RX ADMIN — GENTAMICIN SULFATE: 1 CREAM TOPICAL at 17:14

## 2019-02-25 RX ADMIN — ATORVASTATIN CALCIUM 40 MG: 40 TABLET, FILM COATED ORAL at 21:40

## 2019-02-25 RX ADMIN — BACITRACIN ZINC AND POLYMYXIN B SULFATE 28.3 G: 500; 10000 OINTMENT TOPICAL at 21:40

## 2019-02-25 RX ADMIN — Medication: at 21:40

## 2019-02-25 RX ADMIN — DICLOFENAC 2 G: 10 GEL TOPICAL at 17:14

## 2019-02-25 RX ADMIN — Medication: at 08:37

## 2019-02-25 RX ADMIN — DILTIAZEM HYDROCHLORIDE 300 MG: 180 CAPSULE, COATED, EXTENDED RELEASE ORAL at 08:37

## 2019-02-25 RX ADMIN — BACITRACIN ZINC AND POLYMYXIN B SULFATE: 500; 10000 OINTMENT TOPICAL at 08:37

## 2019-02-25 RX ADMIN — DICLOFENAC 2 G: 10 GEL TOPICAL at 21:46

## 2019-02-25 RX ADMIN — AMIODARONE HYDROCHLORIDE 200 MG: 200 TABLET ORAL at 08:37

## 2019-02-25 RX ADMIN — SODIUM CHLORIDE: 900 INJECTION, SOLUTION INTRAVENOUS at 04:20

## 2019-02-25 RX ADMIN — TRAMADOL HYDROCHLORIDE 50 MG: 50 TABLET, FILM COATED ORAL at 13:20

## 2019-02-25 RX ADMIN — Medication: at 00:20

## 2019-02-25 ASSESSMENT — PAIN - FUNCTIONAL ASSESSMENT
PAIN_FUNCTIONAL_ASSESSMENT: PREVENTS OR INTERFERES SOME ACTIVE ACTIVITIES AND ADLS
PAIN_FUNCTIONAL_ASSESSMENT: PREVENTS OR INTERFERES SOME ACTIVE ACTIVITIES AND ADLS

## 2019-02-25 ASSESSMENT — PAIN DESCRIPTION - DESCRIPTORS
DESCRIPTORS: ACHING
DESCRIPTORS: ACHING

## 2019-02-25 ASSESSMENT — PAIN DESCRIPTION - ONSET
ONSET: ON-GOING
ONSET: ON-GOING

## 2019-02-25 ASSESSMENT — PAIN DESCRIPTION - LOCATION
LOCATION: FOOT
LOCATION: FOOT

## 2019-02-25 ASSESSMENT — PAIN SCALES - GENERAL
PAINLEVEL_OUTOF10: 8
PAINLEVEL_OUTOF10: 8
PAINLEVEL_OUTOF10: 0

## 2019-02-25 ASSESSMENT — PAIN DESCRIPTION - ORIENTATION
ORIENTATION: RIGHT
ORIENTATION: RIGHT

## 2019-02-25 ASSESSMENT — PAIN DESCRIPTION - FREQUENCY
FREQUENCY: CONTINUOUS
FREQUENCY: CONTINUOUS

## 2019-02-25 ASSESSMENT — PAIN DESCRIPTION - PROGRESSION
CLINICAL_PROGRESSION: NOT CHANGED
CLINICAL_PROGRESSION: NOT CHANGED

## 2019-02-25 ASSESSMENT — PAIN DESCRIPTION - PAIN TYPE
TYPE: ACUTE PAIN
TYPE: ACUTE PAIN

## 2019-02-26 LAB
ANION GAP SERPL CALCULATED.3IONS-SCNC: 9 MMOL/L (ref 3–16)
BASOPHILS ABSOLUTE: 0 K/UL (ref 0–0.2)
BASOPHILS RELATIVE PERCENT: 0.2 %
BUN BLDV-MCNC: 17 MG/DL (ref 7–20)
CALCIUM SERPL-MCNC: 7.8 MG/DL (ref 8.3–10.6)
CHLORIDE BLD-SCNC: 102 MMOL/L (ref 99–110)
CLOSTRIDIUM DIFFICILE DNA AMPLIFICATION: ABNORMAL
CLOSTRIDIUM DIFFICILE DNA AMPLIFICATION: ABNORMAL
CO2: 27 MMOL/L (ref 21–32)
CREAT SERPL-MCNC: 1.2 MG/DL (ref 0.8–1.3)
EOSINOPHILS ABSOLUTE: 0.1 K/UL (ref 0–0.6)
EOSINOPHILS RELATIVE PERCENT: 0.5 %
GFR AFRICAN AMERICAN: >60
GFR NON-AFRICAN AMERICAN: 57
GI BACTERIAL PATHOGENS BY PCR: NORMAL
GLUCOSE BLD-MCNC: 96 MG/DL (ref 70–99)
HCT VFR BLD CALC: 35.3 % (ref 40.5–52.5)
HEMOGLOBIN: 11.9 G/DL (ref 13.5–17.5)
INR BLD: 9.39 (ref 0.86–1.14)
LYMPHOCYTES ABSOLUTE: 1.3 K/UL (ref 1–5.1)
LYMPHOCYTES RELATIVE PERCENT: 10.5 %
MAGNESIUM: 1.7 MG/DL (ref 1.8–2.4)
MCH RBC QN AUTO: 30 PG (ref 26–34)
MCHC RBC AUTO-ENTMCNC: 33.7 G/DL (ref 31–36)
MCV RBC AUTO: 89.2 FL (ref 80–100)
MONOCYTES ABSOLUTE: 1.2 K/UL (ref 0–1.3)
MONOCYTES RELATIVE PERCENT: 9.9 %
NEUTROPHILS ABSOLUTE: 9.5 K/UL (ref 1.7–7.7)
NEUTROPHILS RELATIVE PERCENT: 78.9 %
ORGANISM: ABNORMAL
PDW BLD-RTO: 15.5 % (ref 12.4–15.4)
PLATELET # BLD: 273 K/UL (ref 135–450)
PMV BLD AUTO: 8.9 FL (ref 5–10.5)
POTASSIUM REFLEX MAGNESIUM: 3.2 MMOL/L (ref 3.5–5.1)
PROTHROMBIN TIME: 107.1 SEC (ref 9.8–13)
RBC # BLD: 3.96 M/UL (ref 4.2–5.9)
SODIUM BLD-SCNC: 138 MMOL/L (ref 136–145)
WBC # BLD: 12.1 K/UL (ref 4–11)

## 2019-02-26 PROCEDURE — 6370000000 HC RX 637 (ALT 250 FOR IP): Performed by: INTERNAL MEDICINE

## 2019-02-26 PROCEDURE — 6370000000 HC RX 637 (ALT 250 FOR IP): Performed by: STUDENT IN AN ORGANIZED HEALTH CARE EDUCATION/TRAINING PROGRAM

## 2019-02-26 PROCEDURE — 6360000002 HC RX W HCPCS: Performed by: STUDENT IN AN ORGANIZED HEALTH CARE EDUCATION/TRAINING PROGRAM

## 2019-02-26 PROCEDURE — 85025 COMPLETE CBC W/AUTO DIFF WBC: CPT

## 2019-02-26 PROCEDURE — 85610 PROTHROMBIN TIME: CPT

## 2019-02-26 PROCEDURE — 2580000003 HC RX 258: Performed by: INTERNAL MEDICINE

## 2019-02-26 PROCEDURE — 83735 ASSAY OF MAGNESIUM: CPT

## 2019-02-26 PROCEDURE — 36415 COLL VENOUS BLD VENIPUNCTURE: CPT

## 2019-02-26 PROCEDURE — 1200000000 HC SEMI PRIVATE

## 2019-02-26 PROCEDURE — 80048 BASIC METABOLIC PNL TOTAL CA: CPT

## 2019-02-26 RX ORDER — GABAPENTIN 100 MG/1
100 CAPSULE ORAL 3 TIMES DAILY
Status: DISCONTINUED | OUTPATIENT
Start: 2019-02-26 | End: 2019-02-28

## 2019-02-26 RX ORDER — FUROSEMIDE 40 MG/1
40 TABLET ORAL DAILY
Status: DISCONTINUED | OUTPATIENT
Start: 2019-02-27 | End: 2019-03-01

## 2019-02-26 RX ORDER — POTASSIUM CHLORIDE 20 MEQ/1
40 TABLET, EXTENDED RELEASE ORAL EVERY 4 HOURS
Status: COMPLETED | OUTPATIENT
Start: 2019-02-26 | End: 2019-02-26

## 2019-02-26 RX ORDER — PHYTONADIONE 5 MG/1
5 TABLET ORAL ONCE
Status: COMPLETED | OUTPATIENT
Start: 2019-02-26 | End: 2019-02-26

## 2019-02-26 RX ORDER — MAGNESIUM SULFATE IN WATER 40 MG/ML
2 INJECTION, SOLUTION INTRAVENOUS ONCE
Status: COMPLETED | OUTPATIENT
Start: 2019-02-26 | End: 2019-02-26

## 2019-02-26 RX ADMIN — Medication 10 ML: at 21:10

## 2019-02-26 RX ADMIN — PHYTONADIONE 5 MG: 5 TABLET ORAL at 11:37

## 2019-02-26 RX ADMIN — DILTIAZEM HYDROCHLORIDE 300 MG: 180 CAPSULE, COATED, EXTENDED RELEASE ORAL at 08:38

## 2019-02-26 RX ADMIN — BACITRACIN ZINC AND POLYMYXIN B SULFATE 28.3 G: 500; 10000 OINTMENT TOPICAL at 08:40

## 2019-02-26 RX ADMIN — POTASSIUM CHLORIDE 40 MEQ: 20 TABLET, EXTENDED RELEASE ORAL at 15:10

## 2019-02-26 RX ADMIN — ATORVASTATIN CALCIUM 40 MG: 40 TABLET, FILM COATED ORAL at 21:10

## 2019-02-26 RX ADMIN — DICLOFENAC 2 G: 10 GEL TOPICAL at 08:40

## 2019-02-26 RX ADMIN — Medication 125 MG: at 18:38

## 2019-02-26 RX ADMIN — Medication 125 MG: at 06:19

## 2019-02-26 RX ADMIN — MAGNESIUM SULFATE HEPTAHYDRATE 2 G: 40 INJECTION, SOLUTION INTRAVENOUS at 11:37

## 2019-02-26 RX ADMIN — GABAPENTIN 100 MG: 100 CAPSULE ORAL at 21:10

## 2019-02-26 RX ADMIN — DICLOFENAC 2 G: 10 GEL TOPICAL at 21:10

## 2019-02-26 RX ADMIN — GENTAMICIN SULFATE: 1 CREAM TOPICAL at 08:41

## 2019-02-26 RX ADMIN — BACITRACIN ZINC AND POLYMYXIN B SULFATE 28.3 G: 500; 10000 OINTMENT TOPICAL at 21:10

## 2019-02-26 RX ADMIN — Medication: at 08:40

## 2019-02-26 RX ADMIN — GABAPENTIN 100 MG: 100 CAPSULE ORAL at 15:10

## 2019-02-26 RX ADMIN — POTASSIUM CHLORIDE 40 MEQ: 20 TABLET, EXTENDED RELEASE ORAL at 11:37

## 2019-02-26 RX ADMIN — Medication 125 MG: at 11:37

## 2019-02-26 RX ADMIN — ASPIRIN 81 MG 81 MG: 81 TABLET ORAL at 08:38

## 2019-02-26 RX ADMIN — Medication: at 21:10

## 2019-02-26 RX ADMIN — AMIODARONE HYDROCHLORIDE 200 MG: 200 TABLET ORAL at 08:38

## 2019-02-26 RX ADMIN — TRAMADOL HYDROCHLORIDE 50 MG: 50 TABLET, FILM COATED ORAL at 08:54

## 2019-02-26 ASSESSMENT — PAIN SCALES - GENERAL
PAINLEVEL_OUTOF10: 8
PAINLEVEL_OUTOF10: 10
PAINLEVEL_OUTOF10: 10
PAINLEVEL_OUTOF10: 8
PAINLEVEL_OUTOF10: 8

## 2019-02-26 ASSESSMENT — PAIN DESCRIPTION - DESCRIPTORS
DESCRIPTORS: ACHING
DESCRIPTORS: THROBBING
DESCRIPTORS: ACHING
DESCRIPTORS: THROBBING

## 2019-02-26 ASSESSMENT — PAIN - FUNCTIONAL ASSESSMENT
PAIN_FUNCTIONAL_ASSESSMENT: PREVENTS OR INTERFERES SOME ACTIVE ACTIVITIES AND ADLS
PAIN_FUNCTIONAL_ASSESSMENT: PREVENTS OR INTERFERES SOME ACTIVE ACTIVITIES AND ADLS
PAIN_FUNCTIONAL_ASSESSMENT: PREVENTS OR INTERFERES WITH MANY ACTIVE NOT PASSIVE ACTIVITIES
PAIN_FUNCTIONAL_ASSESSMENT: PREVENTS OR INTERFERES SOME ACTIVE ACTIVITIES AND ADLS

## 2019-02-26 ASSESSMENT — PAIN DESCRIPTION - PROGRESSION
CLINICAL_PROGRESSION: NOT CHANGED

## 2019-02-26 ASSESSMENT — PAIN DESCRIPTION - FREQUENCY
FREQUENCY: CONTINUOUS

## 2019-02-26 ASSESSMENT — PAIN DESCRIPTION - ONSET
ONSET: ON-GOING

## 2019-02-26 ASSESSMENT — PAIN DESCRIPTION - DIRECTION
RADIATING_TOWARDS: FOOT

## 2019-02-26 ASSESSMENT — PAIN DESCRIPTION - ORIENTATION
ORIENTATION: RIGHT

## 2019-02-26 ASSESSMENT — PAIN DESCRIPTION - LOCATION
LOCATION: FOOT

## 2019-02-26 ASSESSMENT — PAIN DESCRIPTION - PAIN TYPE
TYPE: ACUTE PAIN

## 2019-02-27 ENCOUNTER — APPOINTMENT (OUTPATIENT)
Dept: GENERAL RADIOLOGY | Age: 84
DRG: 074 | End: 2019-02-27
Payer: MEDICARE

## 2019-02-27 ENCOUNTER — APPOINTMENT (OUTPATIENT)
Dept: CT IMAGING | Age: 84
DRG: 074 | End: 2019-02-27
Payer: MEDICARE

## 2019-02-27 LAB
ANION GAP SERPL CALCULATED.3IONS-SCNC: 8 MMOL/L (ref 3–16)
BASOPHILS ABSOLUTE: 0 K/UL (ref 0–0.2)
BASOPHILS RELATIVE PERCENT: 0.2 %
BUN BLDV-MCNC: 14 MG/DL (ref 7–20)
CALCIUM SERPL-MCNC: 7.8 MG/DL (ref 8.3–10.6)
CHLORIDE BLD-SCNC: 101 MMOL/L (ref 99–110)
CO2: 26 MMOL/L (ref 21–32)
CREAT SERPL-MCNC: 1.1 MG/DL (ref 0.8–1.3)
EOSINOPHILS ABSOLUTE: 0.1 K/UL (ref 0–0.6)
EOSINOPHILS RELATIVE PERCENT: 0.8 %
GFR AFRICAN AMERICAN: >60
GFR NON-AFRICAN AMERICAN: >60
GLUCOSE BLD-MCNC: 81 MG/DL (ref 70–99)
HCT VFR BLD CALC: 35.4 % (ref 40.5–52.5)
HEMOGLOBIN: 11.8 G/DL (ref 13.5–17.5)
INR BLD: 1.96 (ref 0.86–1.14)
LYMPHOCYTES ABSOLUTE: 1.5 K/UL (ref 1–5.1)
LYMPHOCYTES RELATIVE PERCENT: 12.1 %
MAGNESIUM: 1.7 MG/DL (ref 1.8–2.4)
MCH RBC QN AUTO: 30.4 PG (ref 26–34)
MCHC RBC AUTO-ENTMCNC: 33.4 G/DL (ref 31–36)
MCV RBC AUTO: 90.8 FL (ref 80–100)
MONOCYTES ABSOLUTE: 1.3 K/UL (ref 0–1.3)
MONOCYTES RELATIVE PERCENT: 10 %
NEUTROPHILS ABSOLUTE: 9.7 K/UL (ref 1.7–7.7)
NEUTROPHILS RELATIVE PERCENT: 76.9 %
PDW BLD-RTO: 15.3 % (ref 12.4–15.4)
PLATELET # BLD: 253 K/UL (ref 135–450)
PMV BLD AUTO: 8.9 FL (ref 5–10.5)
POTASSIUM REFLEX MAGNESIUM: 3.4 MMOL/L (ref 3.5–5.1)
PROTHROMBIN TIME: 22.4 SEC (ref 9.8–13)
RBC # BLD: 3.9 M/UL (ref 4.2–5.9)
SODIUM BLD-SCNC: 135 MMOL/L (ref 136–145)
WBC # BLD: 12.6 K/UL (ref 4–11)

## 2019-02-27 PROCEDURE — 72131 CT LUMBAR SPINE W/O DYE: CPT

## 2019-02-27 PROCEDURE — 85610 PROTHROMBIN TIME: CPT

## 2019-02-27 PROCEDURE — 2580000003 HC RX 258: Performed by: INTERNAL MEDICINE

## 2019-02-27 PROCEDURE — 85025 COMPLETE CBC W/AUTO DIFF WBC: CPT

## 2019-02-27 PROCEDURE — 97530 THERAPEUTIC ACTIVITIES: CPT

## 2019-02-27 PROCEDURE — 80048 BASIC METABOLIC PNL TOTAL CA: CPT

## 2019-02-27 PROCEDURE — 36415 COLL VENOUS BLD VENIPUNCTURE: CPT

## 2019-02-27 PROCEDURE — 6370000000 HC RX 637 (ALT 250 FOR IP): Performed by: INTERNAL MEDICINE

## 2019-02-27 PROCEDURE — 6360000002 HC RX W HCPCS: Performed by: STUDENT IN AN ORGANIZED HEALTH CARE EDUCATION/TRAINING PROGRAM

## 2019-02-27 PROCEDURE — 97116 GAIT TRAINING THERAPY: CPT

## 2019-02-27 PROCEDURE — 73502 X-RAY EXAM HIP UNI 2-3 VIEWS: CPT

## 2019-02-27 PROCEDURE — 1200000000 HC SEMI PRIVATE

## 2019-02-27 PROCEDURE — 6370000000 HC RX 637 (ALT 250 FOR IP): Performed by: STUDENT IN AN ORGANIZED HEALTH CARE EDUCATION/TRAINING PROGRAM

## 2019-02-27 PROCEDURE — 83735 ASSAY OF MAGNESIUM: CPT

## 2019-02-27 PROCEDURE — 97165 OT EVAL LOW COMPLEX 30 MIN: CPT

## 2019-02-27 PROCEDURE — 97162 PT EVAL MOD COMPLEX 30 MIN: CPT

## 2019-02-27 RX ORDER — POTASSIUM CHLORIDE 20 MEQ/1
40 TABLET, EXTENDED RELEASE ORAL ONCE
Status: COMPLETED | OUTPATIENT
Start: 2019-02-27 | End: 2019-02-27

## 2019-02-27 RX ORDER — WARFARIN SODIUM 2.5 MG/1
2.5 TABLET ORAL DAILY
Status: DISCONTINUED | OUTPATIENT
Start: 2019-02-27 | End: 2019-03-02 | Stop reason: HOSPADM

## 2019-02-27 RX ORDER — MAGNESIUM SULFATE IN WATER 40 MG/ML
2 INJECTION, SOLUTION INTRAVENOUS ONCE
Status: COMPLETED | OUTPATIENT
Start: 2019-02-27 | End: 2019-02-27

## 2019-02-27 RX ADMIN — Medication 10 ML: at 20:56

## 2019-02-27 RX ADMIN — ASPIRIN 81 MG 81 MG: 81 TABLET ORAL at 08:03

## 2019-02-27 RX ADMIN — FUROSEMIDE 40 MG: 40 TABLET ORAL at 08:03

## 2019-02-27 RX ADMIN — BACITRACIN ZINC AND POLYMYXIN B SULFATE 28.3 G: 500; 10000 OINTMENT TOPICAL at 08:06

## 2019-02-27 RX ADMIN — Medication: at 08:05

## 2019-02-27 RX ADMIN — DICLOFENAC 2 G: 10 GEL TOPICAL at 20:57

## 2019-02-27 RX ADMIN — DICLOFENAC 2 G: 10 GEL TOPICAL at 08:05

## 2019-02-27 RX ADMIN — GABAPENTIN 100 MG: 100 CAPSULE ORAL at 14:35

## 2019-02-27 RX ADMIN — BACITRACIN ZINC AND POLYMYXIN B SULFATE: 500; 10000 OINTMENT TOPICAL at 20:57

## 2019-02-27 RX ADMIN — Medication 125 MG: at 18:21

## 2019-02-27 RX ADMIN — MAGNESIUM SULFATE HEPTAHYDRATE 2 G: 40 INJECTION, SOLUTION INTRAVENOUS at 10:33

## 2019-02-27 RX ADMIN — Medication 125 MG: at 00:02

## 2019-02-27 RX ADMIN — ATORVASTATIN CALCIUM 40 MG: 40 TABLET, FILM COATED ORAL at 20:55

## 2019-02-27 RX ADMIN — Medication: at 20:57

## 2019-02-27 RX ADMIN — Medication 125 MG: at 12:39

## 2019-02-27 RX ADMIN — TRAMADOL HYDROCHLORIDE 50 MG: 50 TABLET, FILM COATED ORAL at 20:55

## 2019-02-27 RX ADMIN — Medication 10 ML: at 08:03

## 2019-02-27 RX ADMIN — TRAMADOL HYDROCHLORIDE 50 MG: 50 TABLET, FILM COATED ORAL at 08:03

## 2019-02-27 RX ADMIN — ACETAMINOPHEN 650 MG: 325 TABLET ORAL at 12:39

## 2019-02-27 RX ADMIN — WARFARIN SODIUM 2.5 MG: 2.5 TABLET ORAL at 18:21

## 2019-02-27 RX ADMIN — GABAPENTIN 100 MG: 100 CAPSULE ORAL at 20:55

## 2019-02-27 RX ADMIN — AMIODARONE HYDROCHLORIDE 200 MG: 200 TABLET ORAL at 08:03

## 2019-02-27 RX ADMIN — GABAPENTIN 100 MG: 100 CAPSULE ORAL at 08:06

## 2019-02-27 RX ADMIN — DILTIAZEM HYDROCHLORIDE 300 MG: 180 CAPSULE, COATED, EXTENDED RELEASE ORAL at 08:02

## 2019-02-27 RX ADMIN — Medication 125 MG: at 05:34

## 2019-02-27 RX ADMIN — POTASSIUM CHLORIDE 40 MEQ: 20 TABLET, EXTENDED RELEASE ORAL at 10:33

## 2019-02-27 RX ADMIN — GENTAMICIN SULFATE: 1 CREAM TOPICAL at 08:06

## 2019-02-27 ASSESSMENT — PAIN DESCRIPTION - ONSET
ONSET: ON-GOING

## 2019-02-27 ASSESSMENT — PAIN SCALES - GENERAL
PAINLEVEL_OUTOF10: 3
PAINLEVEL_OUTOF10: 8
PAINLEVEL_OUTOF10: 10

## 2019-02-27 ASSESSMENT — PAIN DESCRIPTION - LOCATION
LOCATION: HIP
LOCATION: FOOT;HIP
LOCATION: TOE (COMMENT WHICH ONE)

## 2019-02-27 ASSESSMENT — PAIN DESCRIPTION - FREQUENCY
FREQUENCY: CONTINUOUS

## 2019-02-27 ASSESSMENT — PAIN DESCRIPTION - PROGRESSION
CLINICAL_PROGRESSION: NOT CHANGED

## 2019-02-27 ASSESSMENT — PAIN DESCRIPTION - DIRECTION
RADIATING_TOWARDS: FOOT

## 2019-02-27 ASSESSMENT — PAIN DESCRIPTION - ORIENTATION
ORIENTATION: RIGHT

## 2019-02-27 ASSESSMENT — PAIN DESCRIPTION - PAIN TYPE
TYPE: ACUTE PAIN

## 2019-02-27 ASSESSMENT — PAIN DESCRIPTION - DESCRIPTORS
DESCRIPTORS: THROBBING
DESCRIPTORS: ACHING
DESCRIPTORS: ACHING

## 2019-02-28 ENCOUNTER — APPOINTMENT (OUTPATIENT)
Dept: MRI IMAGING | Age: 84
DRG: 074 | End: 2019-02-28
Payer: MEDICARE

## 2019-02-28 LAB
ANION GAP SERPL CALCULATED.3IONS-SCNC: 10 MMOL/L (ref 3–16)
BASOPHILS ABSOLUTE: 0 K/UL (ref 0–0.2)
BASOPHILS RELATIVE PERCENT: 0.4 %
BUN BLDV-MCNC: 14 MG/DL (ref 7–20)
CALCIUM SERPL-MCNC: 7.8 MG/DL (ref 8.3–10.6)
CHLORIDE BLD-SCNC: 101 MMOL/L (ref 99–110)
CO2: 27 MMOL/L (ref 21–32)
CREAT SERPL-MCNC: 1.2 MG/DL (ref 0.8–1.3)
EOSINOPHILS ABSOLUTE: 0.1 K/UL (ref 0–0.6)
EOSINOPHILS RELATIVE PERCENT: 1.3 %
GFR AFRICAN AMERICAN: >60
GFR NON-AFRICAN AMERICAN: 57
GLUCOSE BLD-MCNC: 83 MG/DL (ref 70–99)
HCT VFR BLD CALC: 34.9 % (ref 40.5–52.5)
HEMOGLOBIN: 11.6 G/DL (ref 13.5–17.5)
INR BLD: 1.57 (ref 0.86–1.14)
LYMPHOCYTES ABSOLUTE: 1.5 K/UL (ref 1–5.1)
LYMPHOCYTES RELATIVE PERCENT: 17.4 %
MCH RBC QN AUTO: 30 PG (ref 26–34)
MCHC RBC AUTO-ENTMCNC: 33.4 G/DL (ref 31–36)
MCV RBC AUTO: 89.9 FL (ref 80–100)
MONOCYTES ABSOLUTE: 0.9 K/UL (ref 0–1.3)
MONOCYTES RELATIVE PERCENT: 10 %
NEUTROPHILS ABSOLUTE: 6.2 K/UL (ref 1.7–7.7)
NEUTROPHILS RELATIVE PERCENT: 70.9 %
PDW BLD-RTO: 15.3 % (ref 12.4–15.4)
PLATELET # BLD: 268 K/UL (ref 135–450)
PMV BLD AUTO: 8.8 FL (ref 5–10.5)
POTASSIUM REFLEX MAGNESIUM: 3.6 MMOL/L (ref 3.5–5.1)
PROTHROMBIN TIME: 17.9 SEC (ref 9.8–13)
RBC # BLD: 3.88 M/UL (ref 4.2–5.9)
SODIUM BLD-SCNC: 138 MMOL/L (ref 136–145)
WBC # BLD: 8.8 K/UL (ref 4–11)

## 2019-02-28 PROCEDURE — 36415 COLL VENOUS BLD VENIPUNCTURE: CPT

## 2019-02-28 PROCEDURE — 6370000000 HC RX 637 (ALT 250 FOR IP): Performed by: INTERNAL MEDICINE

## 2019-02-28 PROCEDURE — 85610 PROTHROMBIN TIME: CPT

## 2019-02-28 PROCEDURE — 85025 COMPLETE CBC W/AUTO DIFF WBC: CPT

## 2019-02-28 PROCEDURE — 80048 BASIC METABOLIC PNL TOTAL CA: CPT

## 2019-02-28 PROCEDURE — 1200000000 HC SEMI PRIVATE

## 2019-02-28 PROCEDURE — 2580000003 HC RX 258: Performed by: INTERNAL MEDICINE

## 2019-02-28 PROCEDURE — 6370000000 HC RX 637 (ALT 250 FOR IP): Performed by: STUDENT IN AN ORGANIZED HEALTH CARE EDUCATION/TRAINING PROGRAM

## 2019-02-28 PROCEDURE — 72148 MRI LUMBAR SPINE W/O DYE: CPT

## 2019-02-28 PROCEDURE — 97116 GAIT TRAINING THERAPY: CPT

## 2019-02-28 PROCEDURE — 97530 THERAPEUTIC ACTIVITIES: CPT

## 2019-02-28 RX ORDER — GABAPENTIN 100 MG/1
200 CAPSULE ORAL 3 TIMES DAILY
Status: DISCONTINUED | OUTPATIENT
Start: 2019-02-28 | End: 2019-03-02 | Stop reason: HOSPADM

## 2019-02-28 RX ADMIN — WARFARIN SODIUM 2.5 MG: 2.5 TABLET ORAL at 17:30

## 2019-02-28 RX ADMIN — GABAPENTIN 200 MG: 100 CAPSULE ORAL at 21:08

## 2019-02-28 RX ADMIN — Medication: at 21:08

## 2019-02-28 RX ADMIN — DILTIAZEM HYDROCHLORIDE 300 MG: 180 CAPSULE, COATED, EXTENDED RELEASE ORAL at 10:37

## 2019-02-28 RX ADMIN — Medication 125 MG: at 00:15

## 2019-02-28 RX ADMIN — ATORVASTATIN CALCIUM 40 MG: 40 TABLET, FILM COATED ORAL at 21:08

## 2019-02-28 RX ADMIN — FUROSEMIDE 40 MG: 40 TABLET ORAL at 10:38

## 2019-02-28 RX ADMIN — Medication 125 MG: at 06:02

## 2019-02-28 RX ADMIN — GENTAMICIN SULFATE: 1 CREAM TOPICAL at 10:44

## 2019-02-28 RX ADMIN — Medication 125 MG: at 23:33

## 2019-02-28 RX ADMIN — ASPIRIN 81 MG 81 MG: 81 TABLET ORAL at 10:38

## 2019-02-28 RX ADMIN — Medication 10 ML: at 09:00

## 2019-02-28 RX ADMIN — TRAMADOL HYDROCHLORIDE 50 MG: 50 TABLET, FILM COATED ORAL at 10:38

## 2019-02-28 RX ADMIN — GABAPENTIN 200 MG: 100 CAPSULE ORAL at 10:38

## 2019-02-28 RX ADMIN — Medication 125 MG: at 17:29

## 2019-02-28 RX ADMIN — BACITRACIN ZINC AND POLYMYXIN B SULFATE 28.3 G: 500; 10000 OINTMENT TOPICAL at 11:59

## 2019-02-28 RX ADMIN — DICLOFENAC 2 G: 10 GEL TOPICAL at 10:43

## 2019-02-28 RX ADMIN — Medication: at 10:45

## 2019-02-28 RX ADMIN — AMIODARONE HYDROCHLORIDE 200 MG: 200 TABLET ORAL at 10:37

## 2019-02-28 RX ADMIN — GABAPENTIN 200 MG: 100 CAPSULE ORAL at 15:31

## 2019-02-28 RX ADMIN — Medication 125 MG: at 11:59

## 2019-02-28 RX ADMIN — DICLOFENAC 2 G: 10 GEL TOPICAL at 21:08

## 2019-02-28 RX ADMIN — Medication 10 ML: at 21:08

## 2019-02-28 RX ADMIN — BACITRACIN ZINC AND POLYMYXIN B SULFATE: 500; 10000 OINTMENT TOPICAL at 21:08

## 2019-02-28 ASSESSMENT — PAIN DESCRIPTION - ONSET
ONSET: ON-GOING

## 2019-02-28 ASSESSMENT — PAIN - FUNCTIONAL ASSESSMENT: PAIN_FUNCTIONAL_ASSESSMENT: PREVENTS OR INTERFERES SOME ACTIVE ACTIVITIES AND ADLS

## 2019-02-28 ASSESSMENT — PAIN DESCRIPTION - PROGRESSION
CLINICAL_PROGRESSION: NOT CHANGED
CLINICAL_PROGRESSION: NOT CHANGED
CLINICAL_PROGRESSION: GRADUALLY WORSENING

## 2019-02-28 ASSESSMENT — PAIN SCALES - GENERAL
PAINLEVEL_OUTOF10: 10
PAINLEVEL_OUTOF10: 6

## 2019-02-28 ASSESSMENT — PAIN DESCRIPTION - ORIENTATION
ORIENTATION: RIGHT;LEFT
ORIENTATION: RIGHT;LEFT
ORIENTATION: RIGHT

## 2019-02-28 ASSESSMENT — PAIN DESCRIPTION - FREQUENCY
FREQUENCY: CONTINUOUS

## 2019-02-28 ASSESSMENT — PAIN DESCRIPTION - DIRECTION: RADIATING_TOWARDS: DOWN BOTH LEGS TO FEET

## 2019-02-28 ASSESSMENT — PAIN DESCRIPTION - PAIN TYPE
TYPE: CHRONIC PAIN

## 2019-02-28 ASSESSMENT — PAIN DESCRIPTION - DESCRIPTORS
DESCRIPTORS: ACHING

## 2019-02-28 ASSESSMENT — PAIN DESCRIPTION - LOCATION
LOCATION: LEG;FOOT
LOCATION: HIP;TOE (COMMENT WHICH ONE)
LOCATION: LEG

## 2019-03-01 LAB
ANION GAP SERPL CALCULATED.3IONS-SCNC: 9 MMOL/L (ref 3–16)
BASOPHILS ABSOLUTE: 0.1 K/UL (ref 0–0.2)
BASOPHILS RELATIVE PERCENT: 0.7 %
BUN BLDV-MCNC: 13 MG/DL (ref 7–20)
CALCIUM SERPL-MCNC: 8.2 MG/DL (ref 8.3–10.6)
CHLORIDE BLD-SCNC: 100 MMOL/L (ref 99–110)
CO2: 26 MMOL/L (ref 21–32)
CREAT SERPL-MCNC: 1.4 MG/DL (ref 0.8–1.3)
EOSINOPHILS ABSOLUTE: 0.1 K/UL (ref 0–0.6)
EOSINOPHILS RELATIVE PERCENT: 1.1 %
GFR AFRICAN AMERICAN: 58
GFR NON-AFRICAN AMERICAN: 48
GLUCOSE BLD-MCNC: 89 MG/DL (ref 70–99)
HCT VFR BLD CALC: 34.6 % (ref 40.5–52.5)
HEMOGLOBIN: 11.5 G/DL (ref 13.5–17.5)
INR BLD: 1.85 (ref 0.86–1.14)
LYMPHOCYTES ABSOLUTE: 1.8 K/UL (ref 1–5.1)
LYMPHOCYTES RELATIVE PERCENT: 19.1 %
MCH RBC QN AUTO: 30.3 PG (ref 26–34)
MCHC RBC AUTO-ENTMCNC: 33.3 G/DL (ref 31–36)
MCV RBC AUTO: 90.8 FL (ref 80–100)
MONOCYTES ABSOLUTE: 1 K/UL (ref 0–1.3)
MONOCYTES RELATIVE PERCENT: 10.9 %
NEUTROPHILS ABSOLUTE: 6.3 K/UL (ref 1.7–7.7)
NEUTROPHILS RELATIVE PERCENT: 68.2 %
PDW BLD-RTO: 15.2 % (ref 12.4–15.4)
PLATELET # BLD: 254 K/UL (ref 135–450)
PMV BLD AUTO: 8.5 FL (ref 5–10.5)
POTASSIUM REFLEX MAGNESIUM: 3.7 MMOL/L (ref 3.5–5.1)
PROTHROMBIN TIME: 21.1 SEC (ref 9.8–13)
RBC # BLD: 3.81 M/UL (ref 4.2–5.9)
SODIUM BLD-SCNC: 135 MMOL/L (ref 136–145)
WBC # BLD: 9.3 K/UL (ref 4–11)

## 2019-03-01 PROCEDURE — 2580000003 HC RX 258: Performed by: INTERNAL MEDICINE

## 2019-03-01 PROCEDURE — 6370000000 HC RX 637 (ALT 250 FOR IP): Performed by: INTERNAL MEDICINE

## 2019-03-01 PROCEDURE — 97116 GAIT TRAINING THERAPY: CPT

## 2019-03-01 PROCEDURE — 80048 BASIC METABOLIC PNL TOTAL CA: CPT

## 2019-03-01 PROCEDURE — 97530 THERAPEUTIC ACTIVITIES: CPT

## 2019-03-01 PROCEDURE — 36415 COLL VENOUS BLD VENIPUNCTURE: CPT

## 2019-03-01 PROCEDURE — 85610 PROTHROMBIN TIME: CPT

## 2019-03-01 PROCEDURE — 6370000000 HC RX 637 (ALT 250 FOR IP): Performed by: STUDENT IN AN ORGANIZED HEALTH CARE EDUCATION/TRAINING PROGRAM

## 2019-03-01 PROCEDURE — 1200000000 HC SEMI PRIVATE

## 2019-03-01 PROCEDURE — 97535 SELF CARE MNGMENT TRAINING: CPT

## 2019-03-01 PROCEDURE — 85025 COMPLETE CBC W/AUTO DIFF WBC: CPT

## 2019-03-01 RX ORDER — 0.9 % SODIUM CHLORIDE 0.9 %
500 INTRAVENOUS SOLUTION INTRAVENOUS ONCE
Status: COMPLETED | OUTPATIENT
Start: 2019-03-01 | End: 2019-03-01

## 2019-03-01 RX ADMIN — AMIODARONE HYDROCHLORIDE 200 MG: 200 TABLET ORAL at 10:31

## 2019-03-01 RX ADMIN — GENTAMICIN SULFATE: 1 CREAM TOPICAL at 10:37

## 2019-03-01 RX ADMIN — Medication 125 MG: at 05:46

## 2019-03-01 RX ADMIN — Medication 125 MG: at 18:20

## 2019-03-01 RX ADMIN — ATORVASTATIN CALCIUM 40 MG: 40 TABLET, FILM COATED ORAL at 21:03

## 2019-03-01 RX ADMIN — GABAPENTIN 200 MG: 100 CAPSULE ORAL at 21:03

## 2019-03-01 RX ADMIN — DICLOFENAC 2 G: 10 GEL TOPICAL at 21:07

## 2019-03-01 RX ADMIN — SODIUM CHLORIDE 500 ML: 9 INJECTION, SOLUTION INTRAVENOUS at 10:31

## 2019-03-01 RX ADMIN — WARFARIN SODIUM 2.5 MG: 2.5 TABLET ORAL at 18:20

## 2019-03-01 RX ADMIN — Medication 10 ML: at 10:32

## 2019-03-01 RX ADMIN — DICLOFENAC 2 G: 10 GEL TOPICAL at 10:37

## 2019-03-01 RX ADMIN — DILTIAZEM HYDROCHLORIDE 300 MG: 180 CAPSULE, COATED, EXTENDED RELEASE ORAL at 10:32

## 2019-03-01 RX ADMIN — BACITRACIN ZINC AND POLYMYXIN B SULFATE: 500; 10000 OINTMENT TOPICAL at 21:09

## 2019-03-01 RX ADMIN — Medication: at 21:00

## 2019-03-01 RX ADMIN — ASPIRIN 81 MG 81 MG: 81 TABLET ORAL at 10:32

## 2019-03-01 RX ADMIN — GABAPENTIN 200 MG: 100 CAPSULE ORAL at 14:32

## 2019-03-01 RX ADMIN — Medication 125 MG: at 12:41

## 2019-03-01 RX ADMIN — BACITRACIN ZINC AND POLYMYXIN B SULFATE 28.3 G: 500; 10000 OINTMENT TOPICAL at 10:37

## 2019-03-01 RX ADMIN — Medication 10 ML: at 21:04

## 2019-03-01 RX ADMIN — GABAPENTIN 200 MG: 100 CAPSULE ORAL at 10:31

## 2019-03-01 RX ADMIN — Medication: at 10:37

## 2019-03-01 ASSESSMENT — PAIN SCALES - GENERAL
PAINLEVEL_OUTOF10: 0
PAINLEVEL_OUTOF10: 8
PAINLEVEL_OUTOF10: 0
PAINLEVEL_OUTOF10: 8

## 2019-03-01 ASSESSMENT — PAIN DESCRIPTION - PROGRESSION
CLINICAL_PROGRESSION: GRADUALLY WORSENING
CLINICAL_PROGRESSION: GRADUALLY WORSENING

## 2019-03-01 ASSESSMENT — PAIN DESCRIPTION - PAIN TYPE
TYPE: CHRONIC PAIN
TYPE: CHRONIC PAIN

## 2019-03-01 ASSESSMENT — PAIN DESCRIPTION - FREQUENCY
FREQUENCY: CONTINUOUS
FREQUENCY: CONTINUOUS

## 2019-03-01 ASSESSMENT — PAIN DESCRIPTION - DESCRIPTORS
DESCRIPTORS: CONSTANT
DESCRIPTORS: CONSTANT

## 2019-03-01 ASSESSMENT — PAIN DESCRIPTION - LOCATION
LOCATION: LEG
LOCATION: LEG

## 2019-03-01 ASSESSMENT — PAIN DESCRIPTION - DIRECTION: RADIATING_TOWARDS: HIP TO FOOT

## 2019-03-01 ASSESSMENT — PAIN DESCRIPTION - ORIENTATION
ORIENTATION: RIGHT
ORIENTATION: RIGHT

## 2019-03-01 ASSESSMENT — PAIN DESCRIPTION - ONSET
ONSET: GRADUAL
ONSET: GRADUAL

## 2019-03-02 VITALS
WEIGHT: 194.67 LBS | OXYGEN SATURATION: 95 % | HEART RATE: 66 BPM | HEIGHT: 71 IN | TEMPERATURE: 97.3 F | SYSTOLIC BLOOD PRESSURE: 112 MMHG | DIASTOLIC BLOOD PRESSURE: 70 MMHG | RESPIRATION RATE: 18 BRPM | BODY MASS INDEX: 27.25 KG/M2

## 2019-03-02 LAB
ANION GAP SERPL CALCULATED.3IONS-SCNC: 10 MMOL/L (ref 3–16)
BASOPHILS ABSOLUTE: 0.1 K/UL (ref 0–0.2)
BASOPHILS RELATIVE PERCENT: 0.7 %
BUN BLDV-MCNC: 10 MG/DL (ref 7–20)
CALCIUM SERPL-MCNC: 8.1 MG/DL (ref 8.3–10.6)
CHLORIDE BLD-SCNC: 101 MMOL/L (ref 99–110)
CO2: 26 MMOL/L (ref 21–32)
CREAT SERPL-MCNC: 1.1 MG/DL (ref 0.8–1.3)
EOSINOPHILS ABSOLUTE: 0.1 K/UL (ref 0–0.6)
EOSINOPHILS RELATIVE PERCENT: 1.7 %
GFR AFRICAN AMERICAN: >60
GFR NON-AFRICAN AMERICAN: >60
GLUCOSE BLD-MCNC: 92 MG/DL (ref 70–99)
HCT VFR BLD CALC: 35.7 % (ref 40.5–52.5)
HEMOGLOBIN: 11.6 G/DL (ref 13.5–17.5)
INR BLD: 2.02 (ref 0.86–1.14)
LYMPHOCYTES ABSOLUTE: 1.7 K/UL (ref 1–5.1)
LYMPHOCYTES RELATIVE PERCENT: 20.1 %
MAGNESIUM: 1.7 MG/DL (ref 1.8–2.4)
MCH RBC QN AUTO: 29.8 PG (ref 26–34)
MCHC RBC AUTO-ENTMCNC: 32.4 G/DL (ref 31–36)
MCV RBC AUTO: 91.9 FL (ref 80–100)
MONOCYTES ABSOLUTE: 0.8 K/UL (ref 0–1.3)
MONOCYTES RELATIVE PERCENT: 9.3 %
NEUTROPHILS ABSOLUTE: 5.7 K/UL (ref 1.7–7.7)
NEUTROPHILS RELATIVE PERCENT: 68.2 %
PDW BLD-RTO: 15.8 % (ref 12.4–15.4)
PLATELET # BLD: 268 K/UL (ref 135–450)
PMV BLD AUTO: 8.2 FL (ref 5–10.5)
POTASSIUM REFLEX MAGNESIUM: 3.4 MMOL/L (ref 3.5–5.1)
PROTHROMBIN TIME: 23 SEC (ref 9.8–13)
RBC # BLD: 3.89 M/UL (ref 4.2–5.9)
SODIUM BLD-SCNC: 137 MMOL/L (ref 136–145)
WBC # BLD: 8.4 K/UL (ref 4–11)

## 2019-03-02 PROCEDURE — 80048 BASIC METABOLIC PNL TOTAL CA: CPT

## 2019-03-02 PROCEDURE — 6370000000 HC RX 637 (ALT 250 FOR IP): Performed by: INTERNAL MEDICINE

## 2019-03-02 PROCEDURE — 36415 COLL VENOUS BLD VENIPUNCTURE: CPT

## 2019-03-02 PROCEDURE — 83735 ASSAY OF MAGNESIUM: CPT

## 2019-03-02 PROCEDURE — 2580000003 HC RX 258: Performed by: INTERNAL MEDICINE

## 2019-03-02 PROCEDURE — 85610 PROTHROMBIN TIME: CPT

## 2019-03-02 PROCEDURE — 6360000002 HC RX W HCPCS: Performed by: INTERNAL MEDICINE

## 2019-03-02 PROCEDURE — 85025 COMPLETE CBC W/AUTO DIFF WBC: CPT

## 2019-03-02 RX ORDER — GABAPENTIN 100 MG/1
200 CAPSULE ORAL 3 TIMES DAILY
Qty: 84 CAPSULE | Refills: 0 | Status: SHIPPED | OUTPATIENT
Start: 2019-03-02 | End: 2019-10-31

## 2019-03-02 RX ORDER — MAGNESIUM SULFATE IN WATER 40 MG/ML
2 INJECTION, SOLUTION INTRAVENOUS ONCE
Status: COMPLETED | OUTPATIENT
Start: 2019-03-02 | End: 2019-03-02

## 2019-03-02 RX ORDER — WARFARIN SODIUM 2.5 MG/1
2.5 TABLET ORAL DAILY
Qty: 30 TABLET | Refills: 3 | Status: SHIPPED | OUTPATIENT
Start: 2019-03-02 | End: 2019-07-19

## 2019-03-02 RX ORDER — POTASSIUM CHLORIDE 20 MEQ/1
40 TABLET, EXTENDED RELEASE ORAL ONCE
Status: COMPLETED | OUTPATIENT
Start: 2019-03-02 | End: 2019-03-02

## 2019-03-02 RX ADMIN — GENTAMICIN SULFATE: 1 CREAM TOPICAL at 09:46

## 2019-03-02 RX ADMIN — Medication 125 MG: at 06:46

## 2019-03-02 RX ADMIN — ASPIRIN 81 MG 81 MG: 81 TABLET ORAL at 09:46

## 2019-03-02 RX ADMIN — Medication: at 09:47

## 2019-03-02 RX ADMIN — DICLOFENAC 2 G: 10 GEL TOPICAL at 09:47

## 2019-03-02 RX ADMIN — GABAPENTIN 200 MG: 100 CAPSULE ORAL at 09:46

## 2019-03-02 RX ADMIN — DILTIAZEM HYDROCHLORIDE 300 MG: 180 CAPSULE, COATED, EXTENDED RELEASE ORAL at 09:46

## 2019-03-02 RX ADMIN — Medication 125 MG: at 00:10

## 2019-03-02 RX ADMIN — Medication 125 MG: at 11:42

## 2019-03-02 RX ADMIN — Medication 10 ML: at 09:47

## 2019-03-02 RX ADMIN — POTASSIUM CHLORIDE 40 MEQ: 20 TABLET, EXTENDED RELEASE ORAL at 11:42

## 2019-03-02 RX ADMIN — MAGNESIUM SULFATE HEPTAHYDRATE 2 G: 40 INJECTION, SOLUTION INTRAVENOUS at 11:54

## 2019-03-02 RX ADMIN — GABAPENTIN 200 MG: 100 CAPSULE ORAL at 14:33

## 2019-03-02 RX ADMIN — AMIODARONE HYDROCHLORIDE 200 MG: 200 TABLET ORAL at 09:47

## 2019-03-03 ENCOUNTER — CARE COORDINATION (OUTPATIENT)
Dept: CASE MANAGEMENT | Age: 84
End: 2019-03-03

## 2019-03-05 ENCOUNTER — TELEPHONE (OUTPATIENT)
Dept: INTERNAL MEDICINE CLINIC | Age: 84
End: 2019-03-05

## 2019-03-05 NOTE — TELEPHONE ENCOUNTER
[facility] calling  Would like to  Speak with dr Cici Garrett -I lost the call -don't have phone number --please call

## 2019-03-06 ENCOUNTER — TELEPHONE (OUTPATIENT)
Dept: INTERNAL MEDICINE CLINIC | Age: 84
End: 2019-03-06

## 2019-03-06 ENCOUNTER — CARE COORDINATION (OUTPATIENT)
Dept: CASE MANAGEMENT | Age: 84
End: 2019-03-06

## 2019-03-06 NOTE — TELEPHONE ENCOUNTER
Look up [facility] and find out who would like to speak to me regarding this patient since the call was lost.

## 2019-03-07 ENCOUNTER — ANTI-COAG VISIT (OUTPATIENT)
Dept: PHARMACY | Age: 84
End: 2019-03-07
Payer: MEDICARE

## 2019-03-07 DIAGNOSIS — I48.92 ATRIAL FLUTTER, UNSPECIFIED TYPE (HCC): ICD-10-CM

## 2019-03-07 DIAGNOSIS — I48.91 ATRIAL FIBRILLATION, UNSPECIFIED TYPE (HCC): ICD-10-CM

## 2019-03-07 LAB — INTERNATIONAL NORMALIZATION RATIO, POC: 2.5

## 2019-03-07 PROCEDURE — 99211 OFF/OP EST MAY X REQ PHY/QHP: CPT

## 2019-03-07 PROCEDURE — 85610 PROTHROMBIN TIME: CPT

## 2019-03-11 ENCOUNTER — TELEPHONE (OUTPATIENT)
Dept: PHARMACY | Facility: CLINIC | Age: 84
End: 2019-03-11

## 2019-03-11 DIAGNOSIS — I48.91 ATRIAL FIBRILLATION, UNSPECIFIED TYPE (HCC): Primary | ICD-10-CM

## 2019-03-11 PROCEDURE — 1111F DSCHRG MED/CURRENT MED MERGE: CPT | Performed by: PHARMACIST

## 2019-03-14 ENCOUNTER — CARE COORDINATION (OUTPATIENT)
Dept: CASE MANAGEMENT | Age: 84
End: 2019-03-14

## 2019-03-14 ENCOUNTER — TELEPHONE (OUTPATIENT)
Dept: CARDIOLOGY CLINIC | Age: 84
End: 2019-03-14

## 2019-03-14 ENCOUNTER — ANTI-COAG VISIT (OUTPATIENT)
Dept: PHARMACY | Age: 84
End: 2019-03-14
Payer: MEDICARE

## 2019-03-14 DIAGNOSIS — I48.91 ATRIAL FIBRILLATION, UNSPECIFIED TYPE (HCC): ICD-10-CM

## 2019-03-14 DIAGNOSIS — I48.92 ATRIAL FLUTTER, UNSPECIFIED TYPE (HCC): ICD-10-CM

## 2019-03-14 LAB — INTERNATIONAL NORMALIZATION RATIO, POC: 2.7

## 2019-03-14 PROCEDURE — 85610 PROTHROMBIN TIME: CPT

## 2019-03-14 PROCEDURE — 99211 OFF/OP EST MAY X REQ PHY/QHP: CPT

## 2019-03-15 ENCOUNTER — CARE COORDINATION (OUTPATIENT)
Dept: CARE COORDINATION | Age: 84
End: 2019-03-15

## 2019-03-29 ENCOUNTER — ANTI-COAG VISIT (OUTPATIENT)
Dept: PHARMACY | Age: 84
End: 2019-03-29
Payer: MEDICARE

## 2019-03-29 ENCOUNTER — TELEPHONE (OUTPATIENT)
Dept: FAMILY MEDICINE CLINIC | Age: 84
End: 2019-03-29

## 2019-03-29 DIAGNOSIS — I48.91 ATRIAL FIBRILLATION, UNSPECIFIED TYPE (HCC): ICD-10-CM

## 2019-03-29 DIAGNOSIS — I48.92 ATRIAL FLUTTER, UNSPECIFIED TYPE (HCC): ICD-10-CM

## 2019-03-29 LAB — INTERNATIONAL NORMALIZATION RATIO, POC: 2.6

## 2019-03-29 PROCEDURE — 99211 OFF/OP EST MAY X REQ PHY/QHP: CPT

## 2019-03-29 PROCEDURE — 85610 PROTHROMBIN TIME: CPT

## 2019-04-03 ENCOUNTER — OFFICE VISIT (OUTPATIENT)
Dept: CARDIOLOGY CLINIC | Age: 84
End: 2019-04-03
Payer: MEDICARE

## 2019-04-03 VITALS
WEIGHT: 187.8 LBS | HEART RATE: 60 BPM | SYSTOLIC BLOOD PRESSURE: 120 MMHG | DIASTOLIC BLOOD PRESSURE: 76 MMHG | BODY MASS INDEX: 26.57 KG/M2

## 2019-04-03 DIAGNOSIS — I48.19 PERSISTENT ATRIAL FIBRILLATION (HCC): ICD-10-CM

## 2019-04-03 DIAGNOSIS — E78.2 MIXED HYPERLIPIDEMIA: Primary | ICD-10-CM

## 2019-04-03 DIAGNOSIS — I50.32 CHRONIC DIASTOLIC HEART FAILURE (HCC): ICD-10-CM

## 2019-04-03 DIAGNOSIS — I10 ESSENTIAL HYPERTENSION: ICD-10-CM

## 2019-04-03 PROCEDURE — 99214 OFFICE O/P EST MOD 30 MIN: CPT | Performed by: INTERNAL MEDICINE

## 2019-04-03 RX ORDER — AMIODARONE HYDROCHLORIDE 200 MG/1
200 TABLET ORAL DAILY
Qty: 30 TABLET | Refills: 5 | Status: SHIPPED | OUTPATIENT
Start: 2019-04-03 | End: 2019-10-27 | Stop reason: SDUPTHER

## 2019-04-03 NOTE — PROGRESS NOTES
Subjective:      Patient ID: Aylin Sawyer is a 80 y.o. male. CC:  Follow up paroxysmal AF    HPI MrBetty Canales Mai is here for a 4 month review with history of AF. No CP. No near syncope. No palps and no chest pains. hasnt had a headache in \"years\". He has some edema. Clinically in SR today. Poor appetite. No Known Allergies     Social History     Socioeconomic History    Marital status:      Spouse name: Not on file    Number of children: Not on file    Years of education: Not on file    Highest education level: Not on file   Occupational History    Not on file   Social Needs    Financial resource strain: Not on file    Food insecurity:     Worry: Not on file     Inability: Not on file    Transportation needs:     Medical: Not on file     Non-medical: Not on file   Tobacco Use    Smoking status: Former Smoker     Packs/day: 1.00     Years: 34.00     Pack years: 34.00     Last attempt to quit: 1986     Years since quittin.2    Smokeless tobacco: Never Used   Substance and Sexual Activity    Alcohol use: No    Drug use: No    Sexual activity: Never     Partners: Female     Comment:  living with spouse   Lifestyle    Physical activity:     Days per week: Not on file     Minutes per session: Not on file    Stress: Not on file   Relationships    Social connections:     Talks on phone: Not on file     Gets together: Not on file     Attends [employer]ism service: Not on file     Active member of club or organization: Not on file     Attends meetings of clubs or organizations: Not on file     Relationship status: Not on file    Intimate partner violence:     Fear of current or ex partner: Not on file     Emotionally abused: Not on file     Physically abused: Not on file     Forced sexual activity: Not on file   Other Topics Concern    Not on file   Social History Narrative    Not on file        Patient has a family history is not on file.     Patient  has a past medical history of (HFpEF) heart failure with preserved ejection fraction ([facility] [address] CAD (coronary artery disease), Central retinal artery occlusion, Clostridium difficile diarrhea, Hypertension, PAF (paroxysmal atrial fibrillation) ([facility] [address] and PMR (polymyalgia rheumatica) ([facility] [address] Current Outpatient Medications   Medication Sig Dispense Refill    diclofenac sodium 1 % GEL Apply 2 g topically 2 times daily 1 Tube 1    lidocaine (XYLOCAINE) 2 % jelly Apply topically as needed. 1 Tube 0    warfarin (COUMADIN) 2.5 MG tablet Take 1 tablet by mouth daily 30 tablet 3    atorvastatin (LIPITOR) 40 MG tablet Take 40 mg by mouth nightly      furosemide (LASIX) 40 MG tablet Take 1 tablet by mouth daily In the morning on an empty stomach 60 tablet 5    spironolactone (ALDACTONE) 25 MG tablet Take 0.5 tablets by mouth daily 30 tablet 3    Handicap Placard MISC by Does not apply route Patient cannot ambulate more than 200 ft  Expiration date 1/2024 1 each 0    amiodarone (CORDARONE) 200 MG tablet Take 1 tablet by mouth daily 30 tablet 3    aspirin 81 MG tablet Take 81 mg by mouth daily      diltiazem (CARTIA XT) 300 MG extended release capsule TAKE ONE CAPSULE BY MOUTH DAILY 90 capsule 3    gabapentin (NEURONTIN) 100 MG capsule Take 2 capsules by mouth 3 times daily for 14 days. 84 capsule 0     No current facility-administered medications for this visit. Vitals  Weight: 187 lb 12.8 oz (85.2 kg)  Blood Pressure:  128/70  Pulse: 60       Review of Systems   Constitutional: Negative. HENT: Negative. Eyes: Negative. Respiratory: Negative. Cardiovascular: Negative. Gastrointestinal: Negative. Genitourinary: Negative. Musculoskeletal: Negative. Neurological: Negative. Hematological: Negative. Psychiatric/Behavioral: Negative. Objective:   Physical Exam   Nursing note and vitals reviewed. Constitutional: He is oriented to person, place, and time.  He appears well-developed and Essential hypertension     3. Persistent atrial fibrillation ([facility] [address]     4. Chronic diastolic heart failure (HCC)             Plan:        Rhythm:  AF is controlled and is on warfarin. Continue Cartia to 300 mg daily. Continue warfarin and coumadin clinic to continue. Venkatesh Bashir He is SR today on ECG with RBBB and unchanged with 1 PVC. .  May consider stopping amiodarone after epidural injection scheduled for 4/11/2019. Polymyalgia rheumatica:  Prednisone is finished. Central retinal artery occlusion  Seeing rheumatology. He was therapeutic on warfarin .  ldl was 105. HLP:  Mild. Continue  lipitor 40 and asa 81 mg asa as pt was started on high dose prednisone. HTN: controlled with diltiazem. Edema:  controlled and continue laisx 40 daily and spironolactone daily on each. Chronic diastolic HF:  Improving now with lasix. Poor appetite: Will check labs.

## 2019-04-15 ENCOUNTER — OFFICE VISIT (OUTPATIENT)
Dept: INTERNAL MEDICINE CLINIC | Age: 84
End: 2019-04-15
Payer: MEDICARE

## 2019-04-15 ENCOUNTER — TELEPHONE (OUTPATIENT)
Dept: CARDIOLOGY CLINIC | Age: 84
End: 2019-04-15

## 2019-04-15 VITALS
BODY MASS INDEX: 26.99 KG/M2 | TEMPERATURE: 98.6 F | HEART RATE: 76 BPM | WEIGHT: 192.8 LBS | DIASTOLIC BLOOD PRESSURE: 68 MMHG | OXYGEN SATURATION: 96 % | SYSTOLIC BLOOD PRESSURE: 110 MMHG | HEIGHT: 71 IN

## 2019-04-15 DIAGNOSIS — I50.9 CONGESTIVE HEART FAILURE, UNSPECIFIED HF CHRONICITY, UNSPECIFIED HEART FAILURE TYPE (HCC): ICD-10-CM

## 2019-04-15 DIAGNOSIS — I10 ESSENTIAL HYPERTENSION: ICD-10-CM

## 2019-04-15 DIAGNOSIS — Z23 NEED FOR SHINGLES VACCINE: ICD-10-CM

## 2019-04-15 DIAGNOSIS — R60.0 LEG EDEMA: ICD-10-CM

## 2019-04-15 DIAGNOSIS — I10 ESSENTIAL HYPERTENSION, BENIGN: Primary | ICD-10-CM

## 2019-04-15 DIAGNOSIS — I48.0 PAROXYSMAL ATRIAL FIBRILLATION (HCC): ICD-10-CM

## 2019-04-15 DIAGNOSIS — I48.19 PERSISTENT ATRIAL FIBRILLATION (HCC): ICD-10-CM

## 2019-04-15 DIAGNOSIS — E78.2 MIXED HYPERLIPIDEMIA: ICD-10-CM

## 2019-04-15 DIAGNOSIS — I50.32 CHRONIC DIASTOLIC HEART FAILURE (HCC): ICD-10-CM

## 2019-04-15 LAB
A/G RATIO: 1.1 (ref 1.1–2.2)
ALBUMIN SERPL-MCNC: 3 G/DL (ref 3.4–5)
ALP BLD-CCNC: 80 U/L (ref 40–129)
ALT SERPL-CCNC: 45 U/L (ref 10–40)
ANION GAP SERPL CALCULATED.3IONS-SCNC: 10 MMOL/L (ref 3–16)
AST SERPL-CCNC: 29 U/L (ref 15–37)
BASOPHILS ABSOLUTE: 0.1 K/UL (ref 0–0.2)
BASOPHILS RELATIVE PERCENT: 0.4 %
BILIRUB SERPL-MCNC: 0.3 MG/DL (ref 0–1)
BUN BLDV-MCNC: 23 MG/DL (ref 7–20)
CALCIUM SERPL-MCNC: 8.3 MG/DL (ref 8.3–10.6)
CHLORIDE BLD-SCNC: 100 MMOL/L (ref 99–110)
CO2: 31 MMOL/L (ref 21–32)
CREAT SERPL-MCNC: 1.4 MG/DL (ref 0.8–1.3)
EOSINOPHILS ABSOLUTE: 0 K/UL (ref 0–0.6)
EOSINOPHILS RELATIVE PERCENT: 0.3 %
GFR AFRICAN AMERICAN: 58
GFR NON-AFRICAN AMERICAN: 48
GLOBULIN: 2.8 G/DL
GLUCOSE BLD-MCNC: 81 MG/DL (ref 70–99)
HCT VFR BLD CALC: 38.9 % (ref 40.5–52.5)
HEMOGLOBIN: 12.9 G/DL (ref 13.5–17.5)
LYMPHOCYTES ABSOLUTE: 2 K/UL (ref 1–5.1)
LYMPHOCYTES RELATIVE PERCENT: 14.2 %
MCH RBC QN AUTO: 30.3 PG (ref 26–34)
MCHC RBC AUTO-ENTMCNC: 33 G/DL (ref 31–36)
MCV RBC AUTO: 91.8 FL (ref 80–100)
MONOCYTES ABSOLUTE: 1.3 K/UL (ref 0–1.3)
MONOCYTES RELATIVE PERCENT: 9.5 %
NEUTROPHILS ABSOLUTE: 10.5 K/UL (ref 1.7–7.7)
NEUTROPHILS RELATIVE PERCENT: 75.6 %
PDW BLD-RTO: 18 % (ref 12.4–15.4)
PLATELET # BLD: 296 K/UL (ref 135–450)
PMV BLD AUTO: 8.4 FL (ref 5–10.5)
POTASSIUM SERPL-SCNC: 3.8 MMOL/L (ref 3.5–5.1)
RBC # BLD: 4.24 M/UL (ref 4.2–5.9)
SODIUM BLD-SCNC: 141 MMOL/L (ref 136–145)
TOTAL PROTEIN: 5.8 G/DL (ref 6.4–8.2)
WBC # BLD: 13.8 K/UL (ref 4–11)

## 2019-04-15 PROCEDURE — 99214 OFFICE O/P EST MOD 30 MIN: CPT | Performed by: INTERNAL MEDICINE

## 2019-04-15 ASSESSMENT — PATIENT HEALTH QUESTIONNAIRE - PHQ9
1. LITTLE INTEREST OR PLEASURE IN DOING THINGS: 0
SUM OF ALL RESPONSES TO PHQ QUESTIONS 1-9: 0
2. FEELING DOWN, DEPRESSED OR HOPELESS: 0
SUM OF ALL RESPONSES TO PHQ QUESTIONS 1-9: 0
SUM OF ALL RESPONSES TO PHQ9 QUESTIONS 1 & 2: 0

## 2019-04-15 NOTE — PATIENT INSTRUCTIONS
1. Essential hypertension, benign  -stable  -Continue same medications  -Low sodium diet    2. Paroxysmal atrial fibrillation (HCC)  -stable  -Continue same medications  -Continue care per Cardiology    3. Leg edema  -Continue same medications  -Low sodium diet  -elevate legs at rest  -consider compression stocking  -Follow up with Cardiology    4. Congestive heart failure, unspecified HF chronicity, unspecified heart failure type ([facility] [address]  -Continue same medications  -Follow up with Cardiology    5. Need for shingles vaccine  -Patient given prescription for Shingrix vaccine to have done at their pharmacy  - zoster recombinant adjuvanted vaccine [facility]) 50 MCG/0.5ML SUSR injection;  Inject 0.5 mLs into the muscle See Admin Instructions 1 dose now and repeat in 2-6 months  Dispense: 0.5 mL; Refill: 1

## 2019-04-15 NOTE — PROGRESS NOTES
Shelby Dillard   Date ofBirth:  11/9/1931    Date of Visit:  4/15/2019    Chief Complaint   Patient presents with    New Patient    Hypertension    Leg Swelling     Fluid leaking from left leg    Atrial Fibrillation       HPI  Pt presents to establish care with me. Pt has been seen at this office recently. Pt has hypertension. Pt takes Diltiazem ER 300mg po q day and Spironolactone 12.5mg po q day. Pt states he doesn't use salt. Pt states he lost vision in left eye last year 10/19/18 due to a stroke. 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 Pt states he had an epidural 2 days ago for bulging disc. Pt has paroxysmal atrial fibrillation. Pt sees Cardiology. Pt takes Amiodarone and Warfarin. Pt has heart failure. Pt takes Furosemide. Pt states he has leg swelling L>R. Pt states his left leg oozes. Pt states when his diuretics are increased his kidneys fail. Pt has an appointment with Cardiology scheduled for 4/17/19. Pt states he is having labs done today. Review of Systems   Constitutional: Negative for activity change, chills, fatigue and fever. HENT: Negative for congestion, postnasal drip, rhinorrhea and sore throat. Eyes: Positive for visual disturbance. Respiratory: Negative for cough, chest tightness, shortness of breath and wheezing. Cardiovascular: Positive for leg swelling. Negative for chest pain and palpitations. Gastrointestinal: Negative for abdominal pain, constipation, diarrhea, nausea and vomiting. Genitourinary: Negative for dysuria, frequency and hematuria. Musculoskeletal: Positive for arthralgias, gait problem and myalgias. Neurological: Negative for dizziness, syncope, light-headedness, numbness and headaches. Psychiatric/Behavioral: Negative for dysphoric mood and sleep disturbance. The patient is not nervous/anxious.         No Known Allergies  Outpatient Medications Marked as Taking for the 4/15/19 encounter (Office Visit) with hepatosplenomegaly. There is no tenderness. Musculoskeletal: He exhibits edema (BLE pitting edema L>R). Lymphadenopathy:        Head (right side): No submandibular adenopathy present. Head (left side): No submandibular adenopathy present. Psychiatric: He has a normal mood and affect. Nursing note reviewed. No results found for this visit on 04/15/19. Lab Review   Anti-coag visit on 03/29/2019   Component Date Value    INR 03/29/2019 2.6    Anti-coag visit on 03/14/2019   Component Date Value    INR 03/14/2019 2.7    Anti-coag visit on 03/07/2019   Component Date Value    INR 03/07/2019 2.5    Admission on 02/24/2019, Discharged on 03/02/2019   No results displayed because visit has over 200 results.       Orders Only on 02/19/2019   Component Date Value    Protime 02/19/2019 54.1*    INR 02/19/2019 4.75*   Admission on 02/12/2019, Discharged on 02/12/2019   Component Date Value    WBC 02/12/2019 5.5     RBC 02/12/2019 3.95*    Hemoglobin 02/12/2019 12.0*    Hematocrit 02/12/2019 36.1*    MCV 02/12/2019 91.4     MCH 02/12/2019 30.3     MCHC 02/12/2019 33.1     RDW 02/12/2019 15.5*    Platelets 60/36/7133 250     MPV 02/12/2019 8.6     Neutrophils % 02/12/2019 47.6     Lymphocytes % 02/12/2019 28.5     Monocytes % 02/12/2019 22.2     Eosinophils % 02/12/2019 1.2     Basophils % 02/12/2019 0.5     Neutrophils # 02/12/2019 2.6     Lymphocytes # 02/12/2019 1.6     Monocytes # 02/12/2019 1.2     Eosinophils # 02/12/2019 0.1     Basophils # 02/12/2019 0.0     Sodium 02/12/2019 141     Potassium 02/12/2019 3.5     Chloride 02/12/2019 98*    CO2 02/12/2019 28     Anion Gap 02/12/2019 15     Glucose 02/12/2019 106*    BUN 02/12/2019 24*    CREATININE 02/12/2019 2.0*    GFR Non- 02/12/2019 32*    GFR  02/12/2019 38*    Calcium 02/12/2019 8.5     Protime 02/12/2019 41.6*    INR 02/12/2019 3.65*    Pro-BNP 02/12/2019 250     Ventricular Rate 02/12/2019 66     Atrial Rate 02/12/2019 66     P-R Interval 02/12/2019 212     QRS Duration 02/12/2019 124     Q-T Interval 02/12/2019 452     QTc Calculation (Bazett) 02/12/2019 473     P Axis 02/12/2019 90     R Axis 02/12/2019 21     T Axis 02/12/2019 32     Diagnosis 02/12/2019 EKG performed in ER and to be interpreted by ER physician. Confirmed by MD, ER ([id number]),  Chantal Harmon ([id number]) on 2/12/2019 12:33:32 PM    Anti-coag visit on 02/04/2019   Component Date Value    INR 02/04/2019 2.4    Anti-coag visit on 01/25/2019   Component Date Value    INR 01/25/2019 3.1    Orders Only on 01/21/2019   Component Date Value    Pro-BNP 01/21/2019 279    Admission on 01/13/2019, Discharged on 01/13/2019   Component Date Value    Ventricular Rate 01/13/2019 60     Atrial Rate 01/13/2019 60     P-R Interval 01/13/2019 226     QRS Duration 01/13/2019 144     Q-T Interval 01/13/2019 450     QTc Calculation (Bazett) 01/13/2019 450     P Axis 01/13/2019 49     R Axis 01/13/2019 -22     T Axis 01/13/2019 29     Diagnosis 01/13/2019 EKG performed in ER and to be interpreted by ER physician. Confirmed by MD, ER ([id number]),  Chantal Harmon ([id number]) on 1/14/2019 7:09:41 AM     Sodium 01/13/2019 135*    Potassium reflex Magnesi* 01/13/2019 3.9     Chloride 01/13/2019 95*    CO2 01/13/2019 29     Anion Gap 01/13/2019 11     Glucose 01/13/2019 133*    BUN 01/13/2019 12     CREATININE 01/13/2019 1.5*    GFR Non- 01/13/2019 44*    GFR  01/13/2019 54*    Calcium 01/13/2019 8.2*    WBC 01/13/2019 9.0     RBC 01/13/2019 4.19*    Hemoglobin 01/13/2019 12.9*    Hematocrit 01/13/2019 37.0*    MCV 01/13/2019 88.2     MCH 01/13/2019 30.9     MCHC 01/13/2019 35.0     RDW 01/13/2019 15.3     Platelets 25/76/5051 284     MPV 01/13/2019 9.2     Neutrophils % 01/13/2019 66.1     Lymphocytes % 01/13/2019 21.5     Monocytes % 01/13/2019 10.3     Eosinophils % 01/13/2019 1.0     Basophils % 01/13/2019 1.1     Neutrophils # 01/13/2019 5.9     Lymphocytes # 01/13/2019 1.9     Monocytes # 01/13/2019 0.9     Eosinophils # 01/13/2019 0.1     Basophils # 01/13/2019 0.1     Troponin 01/13/2019 0.02*    Pro-BNP 01/13/2019 185     Troponin 01/13/2019 0.01    There may be more visits with results that are not included. Assessment/Plan     1. Essential hypertension, benign  -stable  -Continue same medications  -Low sodium diet    2. Paroxysmal atrial fibrillation (HCC)  -stable  -Continue same medications  -Continue care per Cardiology    3. Leg edema  -Continue same medications  -Low sodium diet  -elevate legs at rest  -consider compression stocking  -Follow up with Cardiology    4. Congestive heart failure, unspecified HF chronicity, unspecified heart failure type ([facility] [address]  -Continue same medications  -Follow up with Cardiology    5. Need for shingles vaccine  -Patient given prescription for Shingrix vaccine to have done at their pharmacy  - zoster recombinant adjuvanted vaccine Psychiatric) 50 MCG/0.5ML SUSR injection; Inject 0.5 mLs into the muscle See Admin Instructions 1 dose now and repeat in 2-6 months  Dispense: 0.5 mL; Refill: 1      Discussedmedications with patient, who voiced understanding of their use and indications. All questions answered. Return in about 3 months (around 7/15/2019) for hypertension.

## 2019-04-15 NOTE — PROGRESS NOTES
PHQ Scores 4/15/2019   PHQ2 Score 0   PHQ9 Score 0     Interpretation of Total Score Depression Severity: 1-4 = Minimal depression, 5-9 = Mild depression, 10-14 = Moderate depression, 15-19 = Moderately severe depression, 20-27 = Severe depression

## 2019-04-17 ENCOUNTER — ANTI-COAG VISIT (OUTPATIENT)
Dept: PHARMACY | Age: 84
End: 2019-04-17
Payer: MEDICARE

## 2019-04-17 ENCOUNTER — OFFICE VISIT (OUTPATIENT)
Dept: CARDIOLOGY CLINIC | Age: 84
End: 2019-04-17
Payer: MEDICARE

## 2019-04-17 VITALS
DIASTOLIC BLOOD PRESSURE: 80 MMHG | HEART RATE: 64 BPM | SYSTOLIC BLOOD PRESSURE: 112 MMHG | WEIGHT: 190.4 LBS | BODY MASS INDEX: 26.56 KG/M2

## 2019-04-17 DIAGNOSIS — I48.92 ATRIAL FLUTTER, UNSPECIFIED TYPE (HCC): ICD-10-CM

## 2019-04-17 DIAGNOSIS — I48.91 ATRIAL FIBRILLATION, UNSPECIFIED TYPE (HCC): ICD-10-CM

## 2019-04-17 DIAGNOSIS — I48.19 PERSISTENT ATRIAL FIBRILLATION (HCC): Primary | ICD-10-CM

## 2019-04-17 DIAGNOSIS — I50.32 CHRONIC DIASTOLIC HEART FAILURE (HCC): ICD-10-CM

## 2019-04-17 DIAGNOSIS — I10 ESSENTIAL HYPERTENSION, BENIGN: ICD-10-CM

## 2019-04-17 LAB — INTERNATIONAL NORMALIZATION RATIO, POC: 1.7

## 2019-04-17 PROCEDURE — 99211 OFF/OP EST MAY X REQ PHY/QHP: CPT

## 2019-04-17 PROCEDURE — 85610 PROTHROMBIN TIME: CPT

## 2019-04-17 PROCEDURE — 99214 OFFICE O/P EST MOD 30 MIN: CPT | Performed by: INTERNAL MEDICINE

## 2019-04-17 NOTE — PROGRESS NOTES
history of (HFpEF) heart failure with preserved ejection fraction ([facility] [address] CAD (coronary artery disease), Central retinal artery occlusion, Clostridium difficile diarrhea, Hypertension, PAF (paroxysmal atrial fibrillation) ([facility] [address] and PMR (polymyalgia rheumatica) ([facility] [address] Current Outpatient Medications   Medication Sig Dispense Refill    zoster recombinant adjuvanted vaccine (SHINGRIX) 50 MCG/0.5ML SUSR injection Inject 0.5 mLs into the muscle See Admin Instructions 1 dose now and repeat in 2-6 months 0.5 mL 1    amiodarone (CORDARONE) 200 MG tablet Take 1 tablet by mouth daily 30 tablet 5    diclofenac sodium 1 % GEL Apply 2 g topically 2 times daily 1 Tube 1    lidocaine (XYLOCAINE) 2 % jelly Apply topically as needed. 1 Tube 0    warfarin (COUMADIN) 2.5 MG tablet Take 1 tablet by mouth daily 30 tablet 3    atorvastatin (LIPITOR) 40 MG tablet Take 40 mg by mouth nightly      furosemide (LASIX) 40 MG tablet Take 1 tablet by mouth daily In the morning on an empty stomach 60 tablet 5    spironolactone (ALDACTONE) 25 MG tablet Take 0.5 tablets by mouth daily 30 tablet 3    Handicap Placard MISC by Does not apply route Patient cannot ambulate more than 200 ft  Expiration date 1/2024 1 each 0    aspirin 81 MG tablet Take 81 mg by mouth daily      diltiazem (CARTIA XT) 300 MG extended release capsule TAKE ONE CAPSULE BY MOUTH DAILY 90 capsule 3    gabapentin (NEURONTIN) 100 MG capsule Take 2 capsules by mouth 3 times daily for 14 days. 84 capsule 0     No current facility-administered medications for this visit. Vitals  Weight: 190 lb 6.4 oz (86.4 kg)  Blood Pressure:  128/70  Pulse: 64       Review of Systems   Constitutional: Negative. HENT: Negative. Eyes: Negative. Respiratory: Negative. Cardiovascular: Negative. Gastrointestinal: Negative. Genitourinary: Negative. Musculoskeletal: Negative. Neurological: Negative. Hematological: Negative.     Psychiatric/Behavioral: Score calculation is dependent on accuracy of patient problem list and past encounter diagnosis. Assessment:       Diagnosis Orders   1. Persistent atrial fibrillation ([facility] [address]     2. Essential hypertension, benign     3. Atrial flutter, unspecified type ([facility] [address]     4. Chronic diastolic heart failure (HCC)             Plan:        Rhythm:  AF is controlled and is on warfarin. Continue Cartia to 300 mg daily. Continue warfarin and coumadin clinic to continue. Parrish Davis He is SR today on ECG with RBBB and unchanged with 1 PVC. .  May consider stopping amiodarone after epidural injection scheduled for 4/11/2019. Polymyalgia rheumatica:  Prednisone is finished. Central retinal artery occlusion  Seeing rheumatology. He was therapeutic on warfarin .  ldl was 105. HLP:  Mild. Continue  lipitor 40 and asa 81 mg asa as pt was started on high dose prednisone. HTN: controlled with diltiazem. Edema:  controlled and continue laisx 80 daily and spironolactone daily on each. Check CMP next week. Chronic diastolic HF:  Improving now with lasix. Poor appetite: Will check labs.

## 2019-04-17 NOTE — PROGRESS NOTES
ANTICOAGULATION SERVICE    Daniele Urbina is a 80 y.o. male with PMHx significant for A-fib/A-flutter, HTN, recent left central retinal artery occlusion who presents to clinic 4/17/2019 for anticoagulation monitoring and adjustment. Patient has been taking warfarin for a couple of years.     Anticoagulation Indication(s):  Afib, Aflutter    Referring Physician:  Dr. Vega Fernandez  Goal INR Range:  2-3  Duration of Anticoagulation Therapy:  Indefinite  Time of day dose taken:  PM  Product patient has at home:  warfarin 5 mg (peach), 2 mg (lavender)      INR Summary                             Warfarin regimen (mg)  Date INR   A/P     Sun Mon Tue Wed Thu Fri Sat Mg/wk  4/17 1.7 Below goal (held doses), continue 2.5 2.5 2.5 2.5 2.5 2.5 2.5 17.5  3/29 2.6 At goal, no change   2.5 2.5 2.5 2.5 2.5 2.5 2.5 17.5  3/14 2.7 At goal, no change   2.5 2.5 2.5 2.5 2.5 2.5 2.5 17.5  3/7 2.5 At goal, no change   2.5 2.5 2.5 2.5 2.5 2.5 2.5 17.5  3/2 2.02 On discharge from [facility]  2.5 2.5 2.5 2.5 2.5 2.5 2.5 17.5  2/4 2.4 At goal, no change   5 2.5 5 2.5 5 2.5 5 27.5  1/25 3.1 Above goal, decrease   5 2.5 5 2.5 5 2.5 5 27.5  1/11 2.8 At goal, no change   5 2.5 5 5 2.5 5 5 30  1/2 3.0 At goal, decrease   5 2.5 5 5 2.5 5 5          30        12/21 3.6 Above goal, hold x 1    5 5 5 5 5 0/5 5 35  12/6 2.8 At goal, no change   5 5 5 5 5 5 5 35  11/19 2.7 At goal, no change   5 5 5 5 5 5 5 35  11/8 3.5 Above goal, decrease   5 5 5 5 2.5/5 5 5 35  10/30 2.75 Per cardio office, continue  5 7 5 5 5 5 5 37    Last CBC:  Lab Results   Component Value Date    RBC 4.24 04/15/2019    HGB 12.9 (L) 04/15/2019    HCT 38.9 (L) 04/15/2019    MCV 91.8 04/15/2019    MCH 30.3 04/15/2019    MPV 8.4 04/15/2019    RDW 18.0 (H) 04/15/2019     04/15/2019       Patient History:  Recent hospitalizations/HC visits -4/11 Epidural injection at [facility]  -2/24-3/2 Admit [facility] for right toe + hip pain, diarrhea, VAL on CKD, elevated INR: patient was treated for pain and for C. diff; INR and creatinine stable on discharge   -1/13 [facility] ED for LE edema, SOB: no med changes  -12/26-12/28 Admit [facility] for CHF exacerbation and left foot cellulitis: patient was diuresed with Lasix and given clindamycin to start outpatient  -11/26 temporal artery biopsy at [facility]: negative for temporal arteritis according to patient's daughter  -10/19 [facility] ED: diagnosed with left central retina artery occlusion   Recent medication changes On discharge from [facility] 3/2:  -decrease warfarin to 2.5 mg daily  -oral vancomycin QID x 6 days for C. Diff (complete)  -gabapentin 200 mg TID  -d/c colchicine and allopurinol    -Amiodarone 200 mg daily started inpatient on 12/26 (will elevate INR, gradual)   Medications taken regularly that may interact with warfarin or alter INR ASA 81 mg, amiodarone 200 mg/d   Warfarin dose taken as prescribed Held x 5 days for epidural injection on 4/11   Yes, does not use pillbox, but has own system of taking medications that works very well   Signs/symptoms of bleeding No h/o bleeding reported   Vitamin K intake Normally has ~0 servings of green, leafy vegetables per week   Recent vomiting/diarrhea/fever, changes in weight or activity level None reported   Tobacco or alcohol use Patient quit smoking in 1955  Patient quit drinking alcohol at least 5 years ago   Upcoming surgeries or procedures None reported     Assessment/Plan:  Patient's INR was subtherapeutic today (1.7), which can be expected after holding warfarin x 5 days last week for epidural injection. Patient was recently hospitalized at [facility] (2/24-3/2), and warfarin dose was decreased to 2.5 mg daily on discharge due to elevated INR during admission. Patient's last three INR readings have been therapeutic since the dose reduction. Patient was instructed to continue warfarin 2.5 mg daily for now. Repeat INR in 2 weeks.   Patient was reminded to maintain consistent vitamin K intake and call with any bleeding, medication changes, or fever/vomiting/diarrhea. Patient understands dosing directions and information discussed. Dosing schedule and follow up appointment given to patient. Progress note routed to referring physician's office. Patient acknowledges working in consult agreement with pharmacist as referred by his/her physician. Next Clinic Appointment:  5/3    Please call [facility] at [phone] with any questions. Thanks! Levon Gant.  Raymond Gutierrez PharmD  [facility]  Ph: [phone]  4/17/2019 9:20 AM

## 2019-04-22 ENCOUNTER — TELEPHONE (OUTPATIENT)
Dept: CARDIOLOGY CLINIC | Age: 84
End: 2019-04-22

## 2019-04-22 ASSESSMENT — ENCOUNTER SYMPTOMS
SHORTNESS OF BREATH: 0
WHEEZING: 0
SORE THROAT: 0
NAUSEA: 0
CHEST TIGHTNESS: 0
DIARRHEA: 0
COUGH: 0
RHINORRHEA: 0
VOMITING: 0
ABDOMINAL PAIN: 0
CONSTIPATION: 0

## 2019-04-22 NOTE — TELEPHONE ENCOUNTER
Hermes Jamil (daughter) called to report that last week Dr. Cleophus Mortimer increased the patients lasix to 80 mg  for 5 days due to increased edema. Today is the last day of that, and the swelling has come down some, but he is still very swollen and wheeping from the left leg. She would like to know if she should keep him on the higher dose of Lasix until they get labs drawn on Wednesday or if they should reduce the dose back to 40mg daily. He feels fine, no pain, no SOB, he is still sleeping in his recliner instead of the bed.      Please call Hermes Jamil @  [phone]

## 2019-04-24 LAB
A/G RATIO: 1.2 (ref 1.1–2.2)
ALBUMIN SERPL-MCNC: 3.1 G/DL (ref 3.4–5)
ALP BLD-CCNC: 88 U/L (ref 40–129)
ALT SERPL-CCNC: 23 U/L (ref 10–40)
ANION GAP SERPL CALCULATED.3IONS-SCNC: 12 MMOL/L (ref 3–16)
AST SERPL-CCNC: 18 U/L (ref 15–37)
BILIRUB SERPL-MCNC: 0.3 MG/DL (ref 0–1)
BUN BLDV-MCNC: 18 MG/DL (ref 7–20)
CALCIUM SERPL-MCNC: 8.2 MG/DL (ref 8.3–10.6)
CHLORIDE BLD-SCNC: 98 MMOL/L (ref 99–110)
CO2: 29 MMOL/L (ref 21–32)
CREAT SERPL-MCNC: 1.8 MG/DL (ref 0.8–1.3)
GFR AFRICAN AMERICAN: 43
GFR NON-AFRICAN AMERICAN: 36
GLOBULIN: 2.5 G/DL
GLUCOSE BLD-MCNC: 130 MG/DL (ref 70–99)
POTASSIUM SERPL-SCNC: 3.6 MMOL/L (ref 3.5–5.1)
SODIUM BLD-SCNC: 139 MMOL/L (ref 136–145)
TOTAL PROTEIN: 5.6 G/DL (ref 6.4–8.2)

## 2019-04-25 DIAGNOSIS — R60.9 EDEMA, UNSPECIFIED TYPE: Primary | ICD-10-CM

## 2019-05-03 ENCOUNTER — ANTI-COAG VISIT (OUTPATIENT)
Dept: PHARMACY | Age: 84
End: 2019-05-03
Payer: MEDICARE

## 2019-05-03 DIAGNOSIS — I48.92 ATRIAL FLUTTER, UNSPECIFIED TYPE (HCC): ICD-10-CM

## 2019-05-03 DIAGNOSIS — I48.19 PERSISTENT ATRIAL FIBRILLATION (HCC): ICD-10-CM

## 2019-05-03 DIAGNOSIS — R60.9 EDEMA, UNSPECIFIED TYPE: ICD-10-CM

## 2019-05-03 LAB
A/G RATIO: 1.2 (ref 1.1–2.2)
ALBUMIN SERPL-MCNC: 3 G/DL (ref 3.4–5)
ALP BLD-CCNC: 90 U/L (ref 40–129)
ALT SERPL-CCNC: 21 U/L (ref 10–40)
ANION GAP SERPL CALCULATED.3IONS-SCNC: 11 MMOL/L (ref 3–16)
AST SERPL-CCNC: 23 U/L (ref 15–37)
BILIRUB SERPL-MCNC: 0.3 MG/DL (ref 0–1)
BUN BLDV-MCNC: 18 MG/DL (ref 7–20)
CALCIUM SERPL-MCNC: 8.6 MG/DL (ref 8.3–10.6)
CHLORIDE BLD-SCNC: 101 MMOL/L (ref 99–110)
CO2: 29 MMOL/L (ref 21–32)
CREAT SERPL-MCNC: 2.1 MG/DL (ref 0.8–1.3)
GFR AFRICAN AMERICAN: 36
GFR NON-AFRICAN AMERICAN: 30
GLOBULIN: 2.5 G/DL
GLUCOSE BLD-MCNC: 96 MG/DL (ref 70–99)
INTERNATIONAL NORMALIZATION RATIO, POC: 1.8
POTASSIUM SERPL-SCNC: 4.1 MMOL/L (ref 3.5–5.1)
SODIUM BLD-SCNC: 141 MMOL/L (ref 136–145)
TOTAL PROTEIN: 5.5 G/DL (ref 6.4–8.2)

## 2019-05-03 PROCEDURE — 85610 PROTHROMBIN TIME: CPT

## 2019-05-03 PROCEDURE — 99211 OFF/OP EST MAY X REQ PHY/QHP: CPT

## 2019-05-03 NOTE — PROGRESS NOTES
ANTICOAGULATION SERVICE    Adonay Orellana is a 80 y.o. male with PMHx significant for A-fib/A-flutter, HTN, recent left central retinal artery occlusion who presents to clinic 5/3/2019 for anticoagulation monitoring and adjustment. Patient has been taking warfarin for a couple of years.     Anticoagulation Indication(s):  Afib, Aflutter    Referring Physician:  Dr. Jody Washington  Goal INR Range:  2-3  Duration of Anticoagulation Therapy:  Indefinite  Time of day dose taken:  PM  Product patient has at home:  warfarin 5 mg (peach), 2 mg (lavender)      INR Summary                             Warfarin regimen (mg)  Date INR   A/P     Sun Mon Tue Wed Thu Fri Sat Mg/wk  5/3 1.8 Below goal, increase   2.5 2.5 2.5 2.5 2.5 5 2.5 20   4/17 1.7 Below goal (held doses), continue 2.5 2.5 2.5 2.5 2.5 2.5 2.5 17.5  3/29 2.6 At goal, no change   2.5 2.5 2.5 2.5 2.5 2.5 2.5 17.5  3/14 2.7 At goal, no change   2.5 2.5 2.5 2.5 2.5 2.5 2.5 17.5  3/7 2.5 At goal, no change   2.5 2.5 2.5 2.5 2.5 2.5 2.5 17.5  3/2 2.02 On discharge from [facility]  2.5 2.5 2.5 2.5 2.5 2.5 2.5 17.5  2/4 2.4 At goal, no change   5 2.5 5 2.5 5 2.5 5 27.5  1/25 3.1 Above goal, decrease   5 2.5 5 2.5 5 2.5 5 27.5  1/11 2.8 At goal, no change   5 2.5 5 5 2.5 5 5 30  1/2 3.0 At goal, decrease   5 2.5 5 5 2.5 5 5          30        12/21 3.6 Above goal, hold x 1    5 5 5 5 5 0/5 5 35  12/6 2.8 At goal, no change   5 5 5 5 5 5 5 35  11/19 2.7 At goal, no change   5 5 5 5 5 5 5 35  11/8 3.5 Above goal, decrease   5 5 5 5 2.5/5 5 5 35  10/30 2.75 Per cardio office, continue  5 7 5 5 5 5 5 37    Last CBC:  Lab Results   Component Value Date    RBC 4.24 04/15/2019    HGB 12.9 (L) 04/15/2019    HCT 38.9 (L) 04/15/2019    MCV 91.8 04/15/2019    MCH 30.3 04/15/2019    MPV 8.4 04/15/2019    RDW 18.0 (H) 04/15/2019     04/15/2019       Patient History:  Recent hospitalizations/HC visits -4/11 Epidural injection at [facility]  -2/24-3/2 Admit [facility] for right toe + hip pain, diarrhea, VAL on CKD, elevated INR: patient was treated for pain and for C. diff; INR and creatinine stable on discharge   -1/13 [facility] ED for LE edema, SOB: no med changes  -12/26-12/28 Admit [facility] for CHF exacerbation and left foot cellulitis: patient was diuresed with Lasix and given clindamycin to start outpatient  -11/26 temporal artery biopsy at [facility]: negative for temporal arteritis according to patient's daughter  -10/19 [facility] ED: diagnosed with left central retina artery occlusion   Recent medication changes On discharge from [facility] 3/2:  -decrease warfarin to 2.5 mg daily  -oral vancomycin QID x 6 days for C. Diff (complete)  -gabapentin 200 mg TID  -d/c colchicine and allopurinol   Medications taken regularly that may interact with warfarin or alter INR ASA 81 mg, amiodarone 200 mg/d   Warfarin dose taken as prescribed Held x 5 days for epidural injection on 4/11   Yes, does not use pillbox, but has own system of taking medications that works very well   Signs/symptoms of bleeding No h/o bleeding reported   Vitamin K intake Normally has ~0 servings of green, leafy vegetables per week   Recent vomiting/diarrhea/fever, changes in weight or activity level None reported   Tobacco or alcohol use Patient quit smoking in 1955  Patient quit drinking alcohol at least 5 years ago   Upcoming surgeries or procedures None reported     Assessment/Plan:  Patient's INR was subtherapeutic today (1.8), unknown etiology. Patient denies any missed/incorrect warfarin doses, diet changes, or medication changes since last visit. Patient was recently hospitalized at [facility] (2/24-3/2), and warfarin dose was decreased to 2.5 mg daily on discharge due to elevated INR during admission. Patient's INR readings throughout March were therapeutic on 2.5 mg daily. However, patient used to take a significantly higher total weekly dose (27.5-35 mg).   Since low INR today is unexplained, patient was instructed to increase warfarin dose to 5 mg on Fridays, and 2.5 mg on all other days . Repeat INR in 2 weeks. Patient was reminded to maintain consistent vitamin K intake and call with any bleeding, medication changes, or fever/vomiting/diarrhea. Patient understands dosing directions and information discussed. Dosing schedule and follow up appointment given to patient. Progress note routed to referring physician's office. Patient acknowledges working in consult agreement with pharmacist as referred by his/her physician. Next Clinic Appointment:  5/15    Please call [facility] at [phone] with any questions. Thanks! Jignesh Rai.  Angie Boucher, PharmD  [facility]  Ph: [phone]  5/3/2019 9:38 AM

## 2019-05-07 ENCOUNTER — TELEPHONE (OUTPATIENT)
Dept: CARDIOLOGY CLINIC | Age: 84
End: 2019-05-07

## 2019-05-07 DIAGNOSIS — R60.9 EDEMA, UNSPECIFIED TYPE: Primary | ICD-10-CM

## 2019-05-15 ENCOUNTER — ANTI-COAG VISIT (OUTPATIENT)
Dept: PHARMACY | Age: 84
End: 2019-05-15
Payer: MEDICARE

## 2019-05-15 DIAGNOSIS — R60.9 EDEMA, UNSPECIFIED TYPE: ICD-10-CM

## 2019-05-15 DIAGNOSIS — I48.92 ATRIAL FLUTTER, UNSPECIFIED TYPE (HCC): ICD-10-CM

## 2019-05-15 DIAGNOSIS — I48.19 PERSISTENT ATRIAL FIBRILLATION (HCC): ICD-10-CM

## 2019-05-15 LAB
A/G RATIO: 1.1 (ref 1.1–2.2)
ALBUMIN SERPL-MCNC: 3 G/DL (ref 3.4–5)
ALP BLD-CCNC: 93 U/L (ref 40–129)
ALT SERPL-CCNC: 29 U/L (ref 10–40)
ANION GAP SERPL CALCULATED.3IONS-SCNC: 9 MMOL/L (ref 3–16)
AST SERPL-CCNC: 26 U/L (ref 15–37)
BILIRUB SERPL-MCNC: 0.3 MG/DL (ref 0–1)
BUN BLDV-MCNC: 13 MG/DL (ref 7–20)
CALCIUM SERPL-MCNC: 8.6 MG/DL (ref 8.3–10.6)
CHLORIDE BLD-SCNC: 101 MMOL/L (ref 99–110)
CO2: 29 MMOL/L (ref 21–32)
CREAT SERPL-MCNC: 1.8 MG/DL (ref 0.8–1.3)
GFR AFRICAN AMERICAN: 43
GFR NON-AFRICAN AMERICAN: 36
GLOBULIN: 2.7 G/DL
GLUCOSE BLD-MCNC: 89 MG/DL (ref 70–99)
INTERNATIONAL NORMALIZATION RATIO, POC: 1.8
POTASSIUM SERPL-SCNC: 4 MMOL/L (ref 3.5–5.1)
SODIUM BLD-SCNC: 139 MMOL/L (ref 136–145)
TOTAL PROTEIN: 5.7 G/DL (ref 6.4–8.2)

## 2019-05-15 PROCEDURE — 85610 PROTHROMBIN TIME: CPT

## 2019-05-15 PROCEDURE — 99211 OFF/OP EST MAY X REQ PHY/QHP: CPT

## 2019-05-16 DIAGNOSIS — R60.9 EDEMA, UNSPECIFIED TYPE: Primary | ICD-10-CM

## 2019-05-31 ENCOUNTER — ANTI-COAG VISIT (OUTPATIENT)
Dept: PHARMACY | Age: 84
End: 2019-05-31
Payer: MEDICARE

## 2019-05-31 DIAGNOSIS — I48.92 ATRIAL FLUTTER, UNSPECIFIED TYPE (HCC): ICD-10-CM

## 2019-05-31 DIAGNOSIS — I48.19 PERSISTENT ATRIAL FIBRILLATION (HCC): ICD-10-CM

## 2019-05-31 LAB — INTERNATIONAL NORMALIZATION RATIO, POC: 2

## 2019-05-31 PROCEDURE — 99211 OFF/OP EST MAY X REQ PHY/QHP: CPT

## 2019-05-31 PROCEDURE — 85610 PROTHROMBIN TIME: CPT

## 2019-05-31 NOTE — PROGRESS NOTES
ANTICOAGULATION SERVICE    Madi Chambers is a 80 y.o. male with PMHx significant for A-fib/A-flutter, HTN, recent left central retinal artery occlusion who presents to clinic 5/31/2019 for anticoagulation monitoring and adjustment. Patient has been taking warfarin for a couple of years.     Anticoagulation Indication(s):  Afib, Aflutter    Referring Physician:  Dr. Teodora Simmons  Goal INR Range:  2-3  Duration of Anticoagulation Therapy:  Indefinite  Time of day dose taken:  PM  Product patient has at home:  warfarin 5 mg (peach), 2 mg (lavender)      INR Summary                             Warfarin regimen (mg)  Date INR   A/P    Sun Mon Tue Wed Thu Fri Sat Mg/wk  5/31 2.0 At goal, no change  2.5 5 2.5 5 2.5 5 2.5 25  5/15 1.8 Below goal, increase  2.5 5 2.5 5 2.5 5 2.5 25     5/3 1.8 Below goal, increase  2.5 2.5 2.5 2.5 2.5 5 2.5 20   4/17 1.7 Below goal (held doses) 2.5 2.5 2.5 2.5 2.5 2.5 2.5 17.5  3/29 2.6 At goal, no change  2.5 2.5 2.5 2.5 2.5 2.5 2.5 17.5  3/14 2.7 At goal, no change  2.5 2.5 2.5 2.5 2.5 2.5 2.5 17.5  3/7 2.5 At goal, no change  2.5 2.5 2.5 2.5 2.5 2.5 2.5 17.5  3/2 2.02 On discharge from [facility] 2.5 2.5 2.5 2.5 2.5 2.5 2.5 17.5  2/4 2.4 At goal, no change  5 2.5 5 2.5 5 2.5 5 27.5  1/25 3.1 Above goal, decrease  5 2.5 5 2.5 5 2.5 5 27.5  1/11 2.8 At goal, no change  5 2.5 5 5 2.5 5 5 30  1/2 3.0 At goal, decrease  5 2.5 5 5 2.5 5 5          30        12/21 3.6 Above goal, hold x 1   5 5 5 5 5 0/5 5 35  12/6 2.8 At goal, no change  5 5 5 5 5 5 5 35  11/19 2.7 At goal, no change  5 5 5 5 5 5 5 35  11/8 3.5 Above goal, decrease  5 5 5 5 2.5/5 5 5 35  10/30 2.75 Per cardio office, continue 5 7 5 5 5 5 5 37    Last CBC:  Lab Results   Component Value Date    RBC 4.24 04/15/2019    HGB 12.9 (L) 04/15/2019    HCT 38.9 (L) 04/15/2019    MCV 91.8 04/15/2019    MCH 30.3 04/15/2019    MPV 8.4 04/15/2019    RDW 18.0 (H) 04/15/2019     04/15/2019       Patient History:  Recent hospitalizations/HC visits -4/11 Epidural injection at [address] [facility]  -2/24-3/2 Admit [facility] for right toe + hip pain, diarrhea, VAL on CKD, elevated INR: patient was treated for pain and for C. diff; INR and creatinine stable on discharge   -1/13 [facility] ED for LE edema, SOB: no med changes  -12/26-12/28 Admit [facility] for CHF exacerbation and left foot cellulitis: patient was diuresed with Lasix and given clindamycin to start outpatient  -11/26 temporal artery biopsy at [facility]: negative for temporal arteritis according to patient's daughter  -10/19 [facility] ED: diagnosed with left central retina artery occlusion   Recent medication changes None reported today   Medications taken regularly that may interact with warfarin or alter INR ASA 81 mg, amiodarone 200 mg/d   Warfarin dose taken as prescribed Yes, does not use pillbox, but has own system of taking medications that works very well   Signs/symptoms of bleeding No h/o bleeding reported   Vitamin K intake Normally has ~0 servings of green, leafy vegetables per week   Recent vomiting/diarrhea/fever, changes in weight or activity level None reported   Tobacco or alcohol use Patient quit smoking in [facility]  Patient quit drinking alcohol at least 5 years ago   Upcoming surgeries or procedures Patient will likely have another epidural injection soon: daughter will inform clinic when scheduled     Assessment/Plan:  Patient's INR was therapeutic today (2.0) following warfarin dose increase at last visit. Patient denies any missed/incorrect warfarin doses, diet changes, or medication changes since last visit. Patient was recently hospitalized at [facility] (2/24-3/2), and warfarin dose was decreased to 2.5 mg daily on discharge due to elevated INR during admission. Patient's INR readings throughout March were therapeutic on 2.5 mg daily. However, patient used to take a significantly higher total weekly dose (27.5-35 mg).   Patient was instructed to continue warfarin 5 mg on MWF, and 2.5 mg on all other days (25

## 2019-06-04 ENCOUNTER — TELEPHONE (OUTPATIENT)
Dept: CARDIOLOGY CLINIC | Age: 84
End: 2019-06-04

## 2019-06-04 NOTE — TELEPHONE ENCOUNTER
Patient's daughter, Hermes Jamil called stating he has been under stress since he cannot drive anymore and wife has cognitive issues. She wonders if Dr. Cleophus Mortimer can prescribe something for agitation. Please call Hermeswil Jamil about this at [phone]. Thanks.

## 2019-06-04 NOTE — TELEPHONE ENCOUNTER
LVM to daughter to let her know that [facility] only treats things cardiac related and that she needs to address this issue with his PCP.

## 2019-06-07 DIAGNOSIS — R60.9 EDEMA, UNSPECIFIED TYPE: ICD-10-CM

## 2019-06-07 LAB
A/G RATIO: 1.3 (ref 1.1–2.2)
ALBUMIN SERPL-MCNC: 3.5 G/DL (ref 3.4–5)
ALP BLD-CCNC: 93 U/L (ref 40–129)
ALT SERPL-CCNC: 26 U/L (ref 10–40)
ANION GAP SERPL CALCULATED.3IONS-SCNC: 11 MMOL/L (ref 3–16)
AST SERPL-CCNC: 29 U/L (ref 15–37)
BILIRUB SERPL-MCNC: 0.4 MG/DL (ref 0–1)
BUN BLDV-MCNC: 15 MG/DL (ref 7–20)
CALCIUM SERPL-MCNC: 8.6 MG/DL (ref 8.3–10.6)
CHLORIDE BLD-SCNC: 100 MMOL/L (ref 99–110)
CO2: 27 MMOL/L (ref 21–32)
CREAT SERPL-MCNC: 1.8 MG/DL (ref 0.8–1.3)
GFR AFRICAN AMERICAN: 43
GFR NON-AFRICAN AMERICAN: 36
GLOBULIN: 2.7 G/DL
GLUCOSE BLD-MCNC: 209 MG/DL (ref 70–99)
POTASSIUM SERPL-SCNC: 4.1 MMOL/L (ref 3.5–5.1)
SODIUM BLD-SCNC: 138 MMOL/L (ref 136–145)
TOTAL PROTEIN: 6.2 G/DL (ref 6.4–8.2)

## 2019-06-12 ENCOUNTER — ANTI-COAG VISIT (OUTPATIENT)
Dept: PHARMACY | Age: 84
End: 2019-06-12
Payer: MEDICARE

## 2019-06-12 ENCOUNTER — OFFICE VISIT (OUTPATIENT)
Dept: CARDIOLOGY CLINIC | Age: 84
End: 2019-06-12
Payer: MEDICARE

## 2019-06-12 VITALS
HEART RATE: 58 BPM | BODY MASS INDEX: 27 KG/M2 | SYSTOLIC BLOOD PRESSURE: 120 MMHG | WEIGHT: 193.6 LBS | DIASTOLIC BLOOD PRESSURE: 64 MMHG

## 2019-06-12 DIAGNOSIS — I48.19 PERSISTENT ATRIAL FIBRILLATION (HCC): ICD-10-CM

## 2019-06-12 DIAGNOSIS — I10 ESSENTIAL HYPERTENSION: ICD-10-CM

## 2019-06-12 DIAGNOSIS — E78.2 MIXED HYPERLIPIDEMIA: ICD-10-CM

## 2019-06-12 DIAGNOSIS — I48.19 PERSISTENT ATRIAL FIBRILLATION (HCC): Primary | ICD-10-CM

## 2019-06-12 DIAGNOSIS — I48.92 ATRIAL FLUTTER, UNSPECIFIED TYPE (HCC): ICD-10-CM

## 2019-06-12 LAB — INR BLD: 1.8

## 2019-06-12 PROCEDURE — 99213 OFFICE O/P EST LOW 20 MIN: CPT | Performed by: INTERNAL MEDICINE

## 2019-06-12 PROCEDURE — 99211 OFF/OP EST MAY X REQ PHY/QHP: CPT

## 2019-06-12 PROCEDURE — 85610 PROTHROMBIN TIME: CPT

## 2019-06-12 NOTE — PROGRESS NOTES
Subjective:      Patient ID: David Hoffman is a 80 y.o. male. CC:  Follow up paroxysmal AF    HPI Mr. Laury Masters is here for a 4 month review with history of AF. No CP. No near syncope. No palps and no chest pains. hasnt had a headache in \"years\". He has some edema. Clinically in SR today. Poor appetite. No Known Allergies     Social History     Socioeconomic History    Marital status:      Spouse name: Not on file    Number of children: Not on file    Years of education: Not on file    Highest education level: Not on file   Occupational History    Not on file   Social Needs    Financial resource strain: Not on file    Food insecurity:     Worry: Not on file     Inability: Not on file    Transportation needs:     Medical: Not on file     Non-medical: Not on file   Tobacco Use    Smoking status: Former Smoker     Packs/day: 1.00     Years: 34.00     Pack years: 34.00     Last attempt to quit: 1986     Years since quittin.4    Smokeless tobacco: Never Used   Substance and Sexual Activity    Alcohol use: No    Drug use: No    Sexual activity: Never     Partners: Female     Comment:  living with spouse   Lifestyle    Physical activity:     Days per week: Not on file     Minutes per session: Not on file    Stress: Not on file   Relationships    Social connections:     Talks on phone: Not on file     Gets together: Not on file     Attends Christian service: Not on file     Active member of club or organization: Not on file     Attends meetings of clubs or organizations: Not on file     Relationship status: Not on file    Intimate partner violence:     Fear of current or ex partner: Not on file     Emotionally abused: Not on file     Physically abused: Not on file     Forced sexual activity: Not on file   Other Topics Concern    Not on file   Social History Narrative    Not on file        Patient has a family history is not on file.     Patient  has a past medical history of (HFpEF) heart failure with preserved ejection fraction ([facility] [address] CAD (coronary artery disease), Central retinal artery occlusion, Clostridium difficile diarrhea, Hypertension, PAF (paroxysmal atrial fibrillation) ([facility] [address] and PMR (polymyalgia rheumatica) ([facility] [address] Current Outpatient Medications   Medication Sig Dispense Refill    zoster recombinant adjuvanted vaccine (SHINGRIX) 50 MCG/0.5ML SUSR injection Inject 0.5 mLs into the muscle See Admin Instructions 1 dose now and repeat in 2-6 months 0.5 mL 1    amiodarone (CORDARONE) 200 MG tablet Take 1 tablet by mouth daily 30 tablet 5    diclofenac sodium 1 % GEL Apply 2 g topically 2 times daily 1 Tube 1    lidocaine (XYLOCAINE) 2 % jelly Apply topically as needed. 1 Tube 0    warfarin (COUMADIN) 2.5 MG tablet Take 1 tablet by mouth daily 30 tablet 3    atorvastatin (LIPITOR) 40 MG tablet Take 40 mg by mouth nightly      furosemide (LASIX) 40 MG tablet Take 1 tablet by mouth daily In the morning on an empty stomach (Patient taking differently: Take 40 mg by mouth daily Indications: taking half daily In the morning on an empty stomach) 60 tablet 5    spironolactone (ALDACTONE) 25 MG tablet Take 0.5 tablets by mouth daily 30 tablet 3    Handicap Placard MISC by Does not apply route Patient cannot ambulate more than 200 ft  Expiration date 1/2024 1 each 0    aspirin 81 MG tablet Take 81 mg by mouth daily      diltiazem (CARTIA XT) 300 MG extended release capsule TAKE ONE CAPSULE BY MOUTH DAILY 90 capsule 3    gabapentin (NEURONTIN) 100 MG capsule Take 2 capsules by mouth 3 times daily for 14 days. 84 capsule 0     No current facility-administered medications for this visit. Vitals  Weight: 193 lb 9.6 oz (87.8 kg)  Blood Pressure:  128/70  Pulse: 58       Review of Systems   Constitutional: Negative. HENT: Negative. Eyes: Negative. Respiratory: Negative. Cardiovascular: Negative. Gastrointestinal: Negative.     Genitourinary: link to the UpToDate guideline \"Atrial Fibrillation: Anticoagulation therapy to prevent embolization    Disclaimer: Risk Score calculation is dependent on accuracy of patient problem list and past encounter diagnosis. Assessment:       Diagnosis Orders   1. Persistent atrial fibrillation ([address]     2. Mixed hyperlipidemia     3. Essential hypertension             Plan:        Rhythm:  AF is controlled and is on warfarin. Continue Cartia to 300 mg daily. Continue warfarin and coumadin clinic to continue. Radha Nickerson He is SR today on ECG with RBBB an d unchanged with 1 PVC. .  May hold warfarin for epidural injection scheduled for 6/27//2019. Polymyalgia rheumatica:  Prednisone is finished. Central retinal artery occlusion  Seeing rheumatology. He was therapeutic on warfarin .  ldl was 105. HLP:  Mild. Continue  lipitor 40 and asa 81 mg asa as pt was started on high dose prednisone. HTN: controlled with diltiazem. Edema:  controlled and continue laisx 80 daily and spironolactone daily on each. Check CMP next week. Chronic diastolic HF: taking 20 mg lasix and has swelling.

## 2019-06-12 NOTE — PROGRESS NOTES
decrease  5 5 5 5 2.5/5 5 5 35  10/30 2.75 Per cardio office, continue 5 7 5 5 5 5 5 37    Last CBC:  Lab Results   Component Value Date    RBC 4.24 04/15/2019    HGB 12.9 (L) 04/15/2019    HCT 38.9 (L) 04/15/2019    MCV 91.8 04/15/2019    MCH 30.3 04/15/2019    MPV 8.4 04/15/2019    RDW 18.0 (H) 04/15/2019     04/15/2019       Patient History:  Recent hospitalizations/HC visits -6/12: Dr. Andrez Gallardo: no med changes  -4/11 Epidural injection at [facility]  -2/24-3/2 Admit [facility] for right toe + hip pain, diarrhea, VAL on CKD, elevated INR: patient was treated for pain and for C. diff; INR and creatinine stable on discharge   -1/13 [facility] ED for LE edema, SOB: no med changes  -12/26-12/28 Admit [facility] for CHF exacerbation and left foot cellulitis: patient was diuresed with Lasix and given clindamycin to start outpatient  -11/26 temporal artery biopsy at [facility]: negative for temporal arteritis according to patient's daughter  -10/19 [facility] ED: diagnosed with left central retina artery occlusion   Recent medication changes None reported today   Medications taken regularly that may interact with warfarin or alter INR ASA 81 mg, amiodarone 200 mg/d   Warfarin dose taken as prescribed Yes, does not use pillbox, but has own system of taking medications that works very well    Signs/symptoms of bleeding No h/o bleeding reported   Vitamin K intake Normally has ~0 servings of green, leafy vegetables per week   Recent vomiting/diarrhea/fever, changes in weight or activity level None reported   Tobacco or alcohol use Patient quit smoking in 1955  Patient quit drinking alcohol at least 5 years ago   Upcoming surgeries or procedures Epidural scheduled for 6/27: Dr. Andrez Gallardo instructed patient to hold warfarin x 5 days PTP; no Lovenox bridge recommended by Dr. Andrez Gallardo     Assessment/Plan:  Patient's INR was subtherapeutic today (1.8).   Patient denies any missed/incorrect warfarin doses, diet changes, or medication changes since

## 2019-07-03 ENCOUNTER — ANTI-COAG VISIT (OUTPATIENT)
Dept: PHARMACY | Age: 84
End: 2019-07-03
Payer: MEDICARE

## 2019-07-03 DIAGNOSIS — I48.92 ATRIAL FLUTTER, UNSPECIFIED TYPE (HCC): ICD-10-CM

## 2019-07-03 DIAGNOSIS — I48.19 PERSISTENT ATRIAL FIBRILLATION (HCC): ICD-10-CM

## 2019-07-03 LAB — INTERNATIONAL NORMALIZATION RATIO, POC: 1.8

## 2019-07-03 PROCEDURE — 99211 OFF/OP EST MAY X REQ PHY/QHP: CPT

## 2019-07-03 PROCEDURE — 85610 PROTHROMBIN TIME: CPT

## 2019-07-03 NOTE — PROGRESS NOTES
ANTICOAGULATION SERVICE    Palmira Carney is a 80 y.o. male with PMHx significant for A-fib/A-flutter, HTN, recent left central retinal artery occlusion who presents to clinic 7/3/2019 for anticoagulation monitoring and adjustment. Patient has been taking warfarin for a couple of years.     Anticoagulation Indication(s):  Afib, Aflutter    Referring Physician:  Dr. Denys Davenport  Goal INR Range:  2-3  Duration of Anticoagulation Therapy:  Indefinite  Time of day dose taken:  PM  Product patient has at home:  warfarin 5 mg (peach), 2 mg (lavender)    [id number] Score for Atrial Fibrillation Stroke Risk   Risk   Factors  Component Value   C CHF Yes 1   H HTN Yes 1   A2 Age >= 76 Yes,  (80 y.o.) 2   D DM No 0   S2 Prior Stroke/TIA No 0   V Vascular Disease No 0   A Age 74-69 No,  (80 y.o.) 0   Sc Sex male 0    [id number]  Score  4   Score last updated 6/12/19 2:29 PM    INR Summary                             Warfarin regimen (mg)  Date INR   A/P    Sun Mon Tue Wed Thu Fri Sat Mg/wk  7/3 1.8 Below goal (held doses) 2.5 5 2.5 5 2.5 5 5 27.5  6/12 1.8 Below goal, increase  2.5 5 2.5 5 2.5 5 5 27.5  5/31 2.0 At goal, no change  2.5 5 2.5 5 2.5 5 2.5 25  5/15 1.8 Below goal, increase  2.5 5 2.5 5 2.5 5 2.5 25     5/3 1.8 Below goal, increase  2.5 2.5 2.5 2.5 2.5 5 2.5 20   4/17 1.7 Below goal (held doses) 2.5 2.5 2.5 2.5 2.5 2.5 2.5 17.5  3/29 2.6 At goal, no change  2.5 2.5 2.5 2.5 2.5 2.5 2.5 17.5  3/14 2.7 At goal, no change  2.5 2.5 2.5 2.5 2.5 2.5 2.5 17.5  3/7 2.5 At goal, no change  2.5 2.5 2.5 2.5 2.5 2.5 2.5 17.5  3/2 2.02 On discharge from [facility] 2.5 2.5 2.5 2.5 2.5 2.5 2.5 17.5  2/4 2.4 At goal, no change  5 2.5 5 2.5 5 2.5 5 27.5  1/25 3.1 Above goal, decrease  5 2.5 5 2.5 5 2.5 5 27.5  1/11 2.8 At goal, no change  5 2.5 5 5 2.5 5 5 30  1/2 3.0 At goal, decrease  5 2.5 5 5 2.5 5 5          30        12/21 3.6 Above goal, hold x 1   5 5 5 5 5 0/5 5 35  12/6 2.8 At goal, no change  5 5 5 5 5 5 5 35  11/19 2.7 At

## 2019-07-08 ENCOUNTER — TELEPHONE (OUTPATIENT)
Dept: CARDIOLOGY CLINIC | Age: 84
End: 2019-07-08

## 2019-07-18 ENCOUNTER — OFFICE VISIT (OUTPATIENT)
Dept: INTERNAL MEDICINE CLINIC | Age: 84
End: 2019-07-18
Payer: MEDICARE

## 2019-07-18 VITALS
SYSTOLIC BLOOD PRESSURE: 128 MMHG | HEIGHT: 71 IN | HEART RATE: 58 BPM | BODY MASS INDEX: 28.34 KG/M2 | OXYGEN SATURATION: 96 % | WEIGHT: 202.4 LBS | DIASTOLIC BLOOD PRESSURE: 60 MMHG

## 2019-07-18 DIAGNOSIS — S39.011A STRAIN OF ABDOMINAL MUSCLE, INITIAL ENCOUNTER: Primary | ICD-10-CM

## 2019-07-18 DIAGNOSIS — M79.651 PAIN IN BOTH THIGHS: ICD-10-CM

## 2019-07-18 DIAGNOSIS — M79.652 PAIN IN BOTH THIGHS: ICD-10-CM

## 2019-07-18 PROCEDURE — 99213 OFFICE O/P EST LOW 20 MIN: CPT | Performed by: INTERNAL MEDICINE

## 2019-07-18 RX ORDER — TIZANIDINE 2 MG/1
TABLET ORAL
Qty: 10 TABLET | Refills: 0 | Status: SHIPPED | OUTPATIENT
Start: 2019-07-18 | End: 2019-07-31 | Stop reason: SDUPTHER

## 2019-07-18 NOTE — PROGRESS NOTES
MG tablet Take 40 mg by mouth nightly      furosemide (LASIX) 40 MG tablet Take 1 tablet by mouth daily In the morning on an empty stomach (Patient taking differently: Take 40 mg by mouth daily Indications: taking half daily In the morning on an empty stomach) 60 tablet 5    spironolactone (ALDACTONE) 25 MG tablet Take 0.5 tablets by mouth daily 30 tablet 3    Handicap Placard MISC by Does not apply route Patient cannot ambulate more than 200 ft  Expiration date 1/2024 1 each 0    diltiazem (CARTIA XT) 300 MG extended release capsule TAKE ONE CAPSULE BY MOUTH DAILY 90 capsule 3    [DISCONTINUED] aspirin 81 MG tablet Take 81 mg by mouth daily           Vitals:    07/18/19 1055   BP: 128/60   Site: Right Upper Arm   Position: Sitting   Cuff Size: Large Adult   Pulse: 58   SpO2: 96%   Weight: 202 lb 6.4 oz (91.8 kg)   Height: 5' 11\" (1.803 m)     Body mass index is 28.23 kg/m². Physical Exam   Constitutional: He appears well-developed and well-nourished. No distress. HENT:   Mouth/Throat: Oropharynx is clear and moist and mucous membranes are normal.   Eyes: Pupils are equal, round, and reactive to light. Conjunctivae, EOM and lids are normal.   Neck: Neck supple. Carotid bruit is not present. No thyromegaly present. Cardiovascular: Normal rate, regular rhythm, S1 normal, S2 normal and normal heart sounds. Exam reveals no gallop and no friction rub. No murmur heard. Pulmonary/Chest: Effort normal and breath sounds normal. He has no wheezes. He has no rhonchi. He has no rales. Abdominal: Soft. Normal appearance and bowel sounds are normal. He exhibits no distension. There is no hepatosplenomegaly. There is no tenderness. Lymphadenopathy:        Head (right side): No submandibular adenopathy present. Head (left side): No submandibular adenopathy present. Psychiatric: He has a normal mood and affect. No results found for this visit on 07/18/19.   Lab Review   Anti-coag visit on 07/03/2019   Component Date Value    INR 07/03/2019 1.8    Anti-coag visit on 06/12/2019   Component Date Value    INR 06/12/2019 1.80    Orders Only on 06/07/2019   Component Date Value    Sodium 06/07/2019 138     Potassium 06/07/2019 4.1     Chloride 06/07/2019 100     CO2 06/07/2019 27     Anion Gap 06/07/2019 11     Glucose 06/07/2019 209*    BUN 06/07/2019 15     CREATININE 06/07/2019 1.8*    GFR Non- 06/07/2019 36*    GFR  06/07/2019 43*    Calcium 06/07/2019 8.6     Total Protein 06/07/2019 6.2*    Alb 06/07/2019 3.5     Albumin/Globulin Ratio 06/07/2019 1.3     Total Bilirubin 06/07/2019 0.4     Alkaline Phosphatase 06/07/2019 93     ALT 06/07/2019 26     AST 06/07/2019 29     Globulin 06/07/2019 2.7    Anti-coag visit on 05/31/2019   Component Date Value    INR 05/31/2019 2.0    Orders Only on 05/15/2019   Component Date Value    Sodium 05/15/2019 139     Potassium 05/15/2019 4.0     Chloride 05/15/2019 101     CO2 05/15/2019 29     Anion Gap 05/15/2019 9     Glucose 05/15/2019 89     BUN 05/15/2019 13     CREATININE 05/15/2019 1.8*    GFR Non- 05/15/2019 36*    GFR  05/15/2019 43*    Calcium 05/15/2019 8.6     Total Protein 05/15/2019 5.7*    Alb 05/15/2019 3.0*    Albumin/Globulin Ratio 05/15/2019 1.1     Total Bilirubin 05/15/2019 0.3     Alkaline Phosphatase 05/15/2019 93     ALT 05/15/2019 29     AST 05/15/2019 26     Globulin 05/15/2019 2.7    Anti-coag visit on 05/15/2019   Component Date Value    INR 05/15/2019 1.8    Orders Only on 05/03/2019   Component Date Value    Sodium 05/03/2019 141     Potassium 05/03/2019 4.1     Chloride 05/03/2019 101     CO2 05/03/2019 29     Anion Gap 05/03/2019 11     Glucose 05/03/2019 96     BUN 05/03/2019 18     CREATININE 05/03/2019 2.1*    GFR Non- 05/03/2019 30*    GFR  05/03/2019 36*    Calcium 05/03/2019 8.6    

## 2019-07-19 ENCOUNTER — TELEPHONE (OUTPATIENT)
Dept: CARDIOLOGY CLINIC | Age: 84
End: 2019-07-19

## 2019-07-19 ENCOUNTER — ANTI-COAG VISIT (OUTPATIENT)
Dept: PHARMACY | Age: 84
End: 2019-07-19
Payer: MEDICARE

## 2019-07-19 DIAGNOSIS — I48.19 PERSISTENT ATRIAL FIBRILLATION (HCC): ICD-10-CM

## 2019-07-19 DIAGNOSIS — I48.92 ATRIAL FLUTTER, UNSPECIFIED TYPE (HCC): ICD-10-CM

## 2019-07-19 LAB — INTERNATIONAL NORMALIZATION RATIO, POC: 3.2

## 2019-07-19 PROCEDURE — 85610 PROTHROMBIN TIME: CPT

## 2019-07-19 PROCEDURE — 99211 OFF/OP EST MAY X REQ PHY/QHP: CPT

## 2019-07-19 RX ORDER — WARFARIN SODIUM 5 MG/1
TABLET ORAL
Qty: 90 TABLET | Refills: 2 | Status: ON HOLD | OUTPATIENT
Start: 2019-07-19 | End: 2020-01-13 | Stop reason: SDUPTHER

## 2019-07-19 RX ORDER — WARFARIN SODIUM 5 MG/1
TABLET ORAL
Qty: 30 TABLET | Refills: 3 | Status: SHIPPED | OUTPATIENT
Start: 2019-07-19 | End: 2019-07-31 | Stop reason: SDUPTHER

## 2019-07-19 NOTE — TELEPHONE ENCOUNTER
[facility] Medication Refills:    Medication: Warfarin    Dosage: 5 mg     Number: 90    Refills: 3    Last Office Visit: 6/19/2019    Next Office Visit: 11/26/19    Last Refill: 03/2/2019    Last Labs:06/12/2019

## 2019-07-26 ASSESSMENT — ENCOUNTER SYMPTOMS
VOMITING: 0
RHINORRHEA: 0
ABDOMINAL PAIN: 1
ABDOMINAL DISTENTION: 0
CONSTIPATION: 0
NAUSEA: 0
SORE THROAT: 0
CHEST TIGHTNESS: 0
DIARRHEA: 0
SHORTNESS OF BREATH: 0
WHEEZING: 0
COUGH: 0
BLOOD IN STOOL: 0

## 2019-07-31 ENCOUNTER — OFFICE VISIT (OUTPATIENT)
Dept: INTERNAL MEDICINE CLINIC | Age: 84
End: 2019-07-31
Payer: MEDICARE

## 2019-07-31 VITALS
DIASTOLIC BLOOD PRESSURE: 70 MMHG | HEIGHT: 71 IN | SYSTOLIC BLOOD PRESSURE: 120 MMHG | OXYGEN SATURATION: 98 % | BODY MASS INDEX: 28.06 KG/M2 | HEART RATE: 63 BPM | WEIGHT: 200.4 LBS

## 2019-07-31 DIAGNOSIS — M79.652 PAIN IN BOTH THIGHS: ICD-10-CM

## 2019-07-31 DIAGNOSIS — S39.011A STRAIN OF ABDOMINAL MUSCLE, INITIAL ENCOUNTER: Primary | ICD-10-CM

## 2019-07-31 DIAGNOSIS — M79.651 PAIN IN BOTH THIGHS: ICD-10-CM

## 2019-07-31 PROCEDURE — 99213 OFFICE O/P EST LOW 20 MIN: CPT | Performed by: INTERNAL MEDICINE

## 2019-07-31 RX ORDER — TIZANIDINE 2 MG/1
1 TABLET ORAL DAILY PRN
Qty: 15 TABLET | Refills: 1 | Status: SHIPPED | OUTPATIENT
Start: 2019-07-31 | End: 2020-01-10 | Stop reason: ALTCHOICE

## 2019-07-31 ASSESSMENT — ENCOUNTER SYMPTOMS
SORE THROAT: 0
COUGH: 0
WHEEZING: 0
CHEST TIGHTNESS: 0
ABDOMINAL DISTENTION: 0
VOMITING: 0
NAUSEA: 0
ABDOMINAL PAIN: 1
SHORTNESS OF BREATH: 0

## 2019-08-02 ENCOUNTER — ANTI-COAG VISIT (OUTPATIENT)
Dept: PHARMACY | Age: 84
End: 2019-08-02
Payer: MEDICARE

## 2019-08-02 DIAGNOSIS — I48.19 PERSISTENT ATRIAL FIBRILLATION (HCC): ICD-10-CM

## 2019-08-02 DIAGNOSIS — I48.92 ATRIAL FLUTTER, UNSPECIFIED TYPE (HCC): ICD-10-CM

## 2019-08-02 LAB — INTERNATIONAL NORMALIZATION RATIO, POC: 3.1

## 2019-08-02 PROCEDURE — 99211 OFF/OP EST MAY X REQ PHY/QHP: CPT

## 2019-08-02 PROCEDURE — 85610 PROTHROMBIN TIME: CPT

## 2019-08-02 NOTE — PROGRESS NOTES
Tobacco or alcohol use Patient quit smoking in 1955  Patient quit drinking alcohol at least 5 years ago   Upcoming surgeries or procedures Third epidural injection likely in near future     Assessment/Plan:  Patient's INR remains slightly supratherapeutic today (3.1), unclear etiology. Patient denies any warfarin dosing errors since last visit. At last visit, patient reported decreased appetite due to abdominal pain; however, this has improved with use of tizanidine for muscle spasms. Recent medication changes (docusate, Norco, tizanidine) would not affect the INR. As INR is trending down and almost therapeutic, patient was instructed to continue warfarin 5 mg daily, except 2.5 mg on Sun/Tue/Thu (27.5 mg weekly). Repeat INR in 3 weeks. Patient was reminded to maintain consistent vitamin K intake and call with any bleeding, medication changes, or fever/vomiting/diarrhea. Patient understands dosing directions and information discussed. Dosing schedule and follow up appointment given to patient. Progress note routed to referring physician's office. Patient acknowledges working in consult agreement with pharmacist as referred by his/her physician. Next Clinic Appointment:  8/23    Please call [facility] at [phone] with any questions. Thanks! Merle Navarro.  Ade Gardner, PharmD  [facility]  Ph: [phone]  8/2/2019 9:33 AM

## 2019-08-23 ENCOUNTER — ANTI-COAG VISIT (OUTPATIENT)
Dept: PHARMACY | Age: 84
End: 2019-08-23
Payer: MEDICARE

## 2019-08-23 LAB — INTERNATIONAL NORMALIZATION RATIO, POC: 3.8

## 2019-08-23 PROCEDURE — 85610 PROTHROMBIN TIME: CPT | Performed by: PHARMACIST

## 2019-08-23 PROCEDURE — 99211 OFF/OP EST MAY X REQ PHY/QHP: CPT | Performed by: PHARMACIST

## 2019-08-23 NOTE — PROGRESS NOTES
ANTICOAGULATION SERVICE    Leonarda Aceves is a 80 y.o. male with PMHx significant for A-fib/A-flutter, HTN, recent left central retinal artery occlusion who presents to clinic 8/23/2019 for anticoagulation monitoring and adjustment. Patient has been taking warfarin for a couple of years.     Anticoagulation Indication(s):  Afib, Aflutter    Referring Physician:  Dr. Levsia Castañeda  Goal INR Range:  2-3  Duration of Anticoagulation Therapy:  Indefinite  Time of day dose taken:  PM  Product patient has at home:  warfarin 5 mg (peach), 2 mg (lavender)    [id number] Score for Atrial Fibrillation Stroke Risk   Risk   Factors  Component Value   C CHF Yes 1   H HTN Yes 1   A2 Age >= 76 Yes,  (80 y.o.) 2   D DM No 0   S2 Prior Stroke/TIA No 0   V Vascular Disease No 0   A Age 74-69 No,  (80 y.o.) 0   Sc Sex male 0    [id number]  Score  4   Score last updated 6/12/19 2:29 PM    INR Summary                             Warfarin regimen (mg)  Date INR   A/P    Sun Mon Tue Wed Thu Fri Sat Mg/wk  8/23 3.8 Above goal, hold, dec  2.5 5 2.5 5 2.5 0/5 2.5 25  8/2 3.1 Above goal, continue  2.5 5 2.5 5 2.5 5 5 27.5   7/19 3.2 Above goal, decrease x1 2.5 5 2.5 5 2.5 2.5/5 5 27.5   7/3 1.8 Below goal (held doses) 2.5 5 2.5 5 2.5 5 5 27.5  6/12 1.8 Below goal, increase  2.5 5 2.5 5 2.5 5 5 27.5  5/31 2.0 At goal, no change  2.5 5 2.5 5 2.5 5 2.5 25  5/15 1.8 Below goal, increase  2.5 5 2.5 5 2.5 5 2.5 25     5/3 1.8 Below goal, increase  2.5 2.5 2.5 2.5 2.5 5 2.5 20   4/17 1.7 Below goal (held doses) 2.5 2.5 2.5 2.5 2.5 2.5 2.5 17.5  3/29 2.6 At goal, no change  2.5 2.5 2.5 2.5 2.5 2.5 2.5 17.5  3/14 2.7 At goal, no change  2.5 2.5 2.5 2.5 2.5 2.5 2.5 17.5  3/7 2.5 At goal, no change  2.5 2.5 2.5 2.5 2.5 2.5 2.5 17.5  3/2 2.02 On discharge from [facility] 2.5 2.5 2.5 2.5 2.5 2.5 2.5 17.5  2/4 2.4 At goal, no change  5 2.5 5 2.5 5 2.5 5 27.5  1/25 3.1 Above goal, decrease  5 2.5 5 2.5 5 2.5 5 27.5  1/11 2.8 At goal, no

## 2019-09-05 ENCOUNTER — ANTI-COAG VISIT (OUTPATIENT)
Dept: PHARMACY | Age: 84
End: 2019-09-05
Payer: MEDICARE

## 2019-09-05 DIAGNOSIS — I48.92 ATRIAL FLUTTER, UNSPECIFIED TYPE (HCC): ICD-10-CM

## 2019-09-05 DIAGNOSIS — I48.19 PERSISTENT ATRIAL FIBRILLATION (HCC): ICD-10-CM

## 2019-09-05 LAB — INTERNATIONAL NORMALIZATION RATIO, POC: 3

## 2019-09-05 PROCEDURE — 85610 PROTHROMBIN TIME: CPT

## 2019-09-05 PROCEDURE — 99211 OFF/OP EST MAY X REQ PHY/QHP: CPT

## 2019-09-11 ENCOUNTER — TELEPHONE (OUTPATIENT)
Dept: CARDIOLOGY CLINIC | Age: 84
End: 2019-09-11

## 2019-09-11 NOTE — TELEPHONE ENCOUNTER
Daughter calling to report that Dad does not take Gabapentin 200 mg TID for neuropathy any more    Podiatrist wan to prescribe Cymbalta fo rhis Neuropathy    Is that ok?       60 mg qd Cymbalta    Please call daughter to advise

## 2019-10-03 ENCOUNTER — ANTI-COAG VISIT (OUTPATIENT)
Dept: PHARMACY | Age: 84
End: 2019-10-03
Payer: MEDICARE

## 2019-10-03 DIAGNOSIS — I48.92 ATRIAL FLUTTER, UNSPECIFIED TYPE (HCC): ICD-10-CM

## 2019-10-03 LAB — INTERNATIONAL NORMALIZATION RATIO, POC: 2.3

## 2019-10-03 PROCEDURE — 85610 PROTHROMBIN TIME: CPT

## 2019-10-03 PROCEDURE — 99211 OFF/OP EST MAY X REQ PHY/QHP: CPT

## 2019-10-17 ENCOUNTER — ANTI-COAG VISIT (OUTPATIENT)
Dept: PHARMACY | Age: 84
End: 2019-10-17
Payer: MEDICARE

## 2019-10-17 DIAGNOSIS — I48.92 ATRIAL FLUTTER, UNSPECIFIED TYPE (HCC): ICD-10-CM

## 2019-10-17 DIAGNOSIS — I48.91 ATRIAL FIBRILLATION, UNSPECIFIED TYPE (HCC): Primary | ICD-10-CM

## 2019-10-17 LAB — INTERNATIONAL NORMALIZATION RATIO, POC: 2.5

## 2019-10-17 PROCEDURE — 99211 OFF/OP EST MAY X REQ PHY/QHP: CPT

## 2019-10-17 PROCEDURE — 85610 PROTHROMBIN TIME: CPT

## 2019-10-17 RX ORDER — WARFARIN SODIUM 2.5 MG/1
2.5 TABLET ORAL DAILY
Qty: 30 TABLET | Refills: 3 | Status: ON HOLD | OUTPATIENT
Start: 2019-10-17 | End: 2020-01-13 | Stop reason: SDUPTHER

## 2019-10-31 ENCOUNTER — OFFICE VISIT (OUTPATIENT)
Dept: PRIMARY CARE CLINIC | Age: 84
End: 2019-10-31
Payer: MEDICARE

## 2019-10-31 VITALS
DIASTOLIC BLOOD PRESSURE: 60 MMHG | HEIGHT: 71 IN | HEART RATE: 60 BPM | SYSTOLIC BLOOD PRESSURE: 122 MMHG | BODY MASS INDEX: 25.81 KG/M2 | OXYGEN SATURATION: 94 % | WEIGHT: 184.4 LBS

## 2019-10-31 DIAGNOSIS — N18.30 STAGE 3 CHRONIC KIDNEY DISEASE (HCC): ICD-10-CM

## 2019-10-31 DIAGNOSIS — Z23 NEED FOR INFLUENZA VACCINATION: ICD-10-CM

## 2019-10-31 DIAGNOSIS — I10 ESSENTIAL HYPERTENSION: Primary | ICD-10-CM

## 2019-10-31 PROCEDURE — 90653 IIV ADJUVANT VACCINE IM: CPT | Performed by: INTERNAL MEDICINE

## 2019-10-31 PROCEDURE — G0008 ADMIN INFLUENZA VIRUS VAC: HCPCS | Performed by: INTERNAL MEDICINE

## 2019-10-31 PROCEDURE — 99213 OFFICE O/P EST LOW 20 MIN: CPT | Performed by: INTERNAL MEDICINE

## 2019-10-31 ASSESSMENT — ENCOUNTER SYMPTOMS
CHEST TIGHTNESS: 0
SHORTNESS OF BREATH: 0

## 2019-11-07 ENCOUNTER — ANTI-COAG VISIT (OUTPATIENT)
Dept: PHARMACY | Age: 84
End: 2019-11-07
Payer: MEDICARE

## 2019-11-07 LAB — INTERNATIONAL NORMALIZATION RATIO, POC: 3

## 2019-11-07 PROCEDURE — 99211 OFF/OP EST MAY X REQ PHY/QHP: CPT

## 2019-11-07 PROCEDURE — 85610 PROTHROMBIN TIME: CPT

## 2019-11-17 DIAGNOSIS — I48.20 CHRONIC ATRIAL FIBRILLATION (HCC): ICD-10-CM

## 2019-11-20 RX ORDER — DILTIAZEM HYDROCHLORIDE 300 MG/1
CAPSULE, COATED, EXTENDED RELEASE ORAL
Qty: 90 CAPSULE | Refills: 3 | Status: SHIPPED | OUTPATIENT
Start: 2019-11-20

## 2019-11-27 RX ORDER — ATORVASTATIN CALCIUM 80 MG/1
TABLET, FILM COATED ORAL
Qty: 30 TABLET | Refills: 4 | Status: SHIPPED | OUTPATIENT
Start: 2019-11-27

## 2019-12-04 ENCOUNTER — ANTI-COAG VISIT (OUTPATIENT)
Dept: PHARMACY | Age: 84
End: 2019-12-04
Payer: MEDICARE

## 2019-12-04 ENCOUNTER — OFFICE VISIT (OUTPATIENT)
Dept: CARDIOLOGY CLINIC | Age: 84
End: 2019-12-04
Payer: MEDICARE

## 2019-12-04 VITALS
BODY MASS INDEX: 26.5 KG/M2 | DIASTOLIC BLOOD PRESSURE: 60 MMHG | WEIGHT: 190 LBS | HEART RATE: 64 BPM | SYSTOLIC BLOOD PRESSURE: 120 MMHG

## 2019-12-04 DIAGNOSIS — I50.32 CHRONIC DIASTOLIC HEART FAILURE (HCC): ICD-10-CM

## 2019-12-04 DIAGNOSIS — I48.0 PAROXYSMAL ATRIAL FIBRILLATION (HCC): Primary | ICD-10-CM

## 2019-12-04 DIAGNOSIS — F41.9 ANXIETY: ICD-10-CM

## 2019-12-04 DIAGNOSIS — I10 ESSENTIAL HYPERTENSION, BENIGN: ICD-10-CM

## 2019-12-04 DIAGNOSIS — I48.91 ATRIAL FIBRILLATION, UNSPECIFIED TYPE (HCC): ICD-10-CM

## 2019-12-04 LAB — INTERNATIONAL NORMALIZATION RATIO, POC: 3.4

## 2019-12-04 PROCEDURE — 99212 OFFICE O/P EST SF 10 MIN: CPT

## 2019-12-04 PROCEDURE — 85610 PROTHROMBIN TIME: CPT

## 2019-12-04 PROCEDURE — 99213 OFFICE O/P EST LOW 20 MIN: CPT | Performed by: INTERNAL MEDICINE

## 2019-12-04 RX ORDER — ESCITALOPRAM OXALATE 10 MG/1
10 TABLET ORAL DAILY
Qty: 90 TABLET | Refills: 3 | Status: SHIPPED | OUTPATIENT
Start: 2019-12-04

## 2019-12-04 RX ORDER — FUROSEMIDE 40 MG/1
40 TABLET ORAL DAILY
Qty: 45 TABLET | Refills: 3 | Status: SHIPPED | OUTPATIENT
Start: 2019-12-04 | End: 2019-12-05 | Stop reason: SDUPTHER

## 2019-12-05 DIAGNOSIS — I10 ESSENTIAL HYPERTENSION, BENIGN: ICD-10-CM

## 2019-12-05 RX ORDER — FUROSEMIDE 40 MG/1
20 TABLET ORAL DAILY
Qty: 30 TABLET | Refills: 3 | Status: SHIPPED | OUTPATIENT
Start: 2019-12-05

## 2019-12-19 ENCOUNTER — ANTI-COAG VISIT (OUTPATIENT)
Dept: PHARMACY | Age: 84
End: 2019-12-19
Payer: MEDICARE

## 2019-12-19 DIAGNOSIS — I48.0 PAROXYSMAL ATRIAL FIBRILLATION (HCC): ICD-10-CM

## 2019-12-19 LAB — INTERNATIONAL NORMALIZATION RATIO, POC: 2.1

## 2019-12-19 PROCEDURE — 85610 PROTHROMBIN TIME: CPT

## 2019-12-19 PROCEDURE — 99211 OFF/OP EST MAY X REQ PHY/QHP: CPT

## 2020-01-09 ENCOUNTER — ANTI-COAG VISIT (OUTPATIENT)
Dept: PHARMACY | Age: 85
End: 2020-01-09
Payer: MEDICARE

## 2020-01-09 LAB
BASOPHILS ABSOLUTE: 0 K/UL (ref 0–0.2)
BASOPHILS RELATIVE PERCENT: 0.4 %
EOSINOPHILS ABSOLUTE: 0.1 K/UL (ref 0–0.6)
EOSINOPHILS RELATIVE PERCENT: 1.4 %
HCT VFR BLD CALC: 24.6 % (ref 40.5–52.5)
HEMOGLOBIN: 8.1 G/DL (ref 13.5–17.5)
INR BLD: 3.03 (ref 0.86–1.14)
LYMPHOCYTES ABSOLUTE: 1.3 K/UL (ref 1–5.1)
LYMPHOCYTES RELATIVE PERCENT: 21.5 %
MCH RBC QN AUTO: 25.6 PG (ref 26–34)
MCHC RBC AUTO-ENTMCNC: 32.8 G/DL (ref 31–36)
MCV RBC AUTO: 78.1 FL (ref 80–100)
MONOCYTES ABSOLUTE: 0.8 K/UL (ref 0–1.3)
MONOCYTES RELATIVE PERCENT: 12.7 %
NEUTROPHILS ABSOLUTE: 3.9 K/UL (ref 1.7–7.7)
NEUTROPHILS RELATIVE PERCENT: 64 %
PDW BLD-RTO: 16.7 % (ref 12.4–15.4)
PLATELET # BLD: 270 K/UL (ref 135–450)
PMV BLD AUTO: 8.2 FL (ref 5–10.5)
PROTHROMBIN TIME: 35.6 SEC (ref 10–13.2)
RBC # BLD: 3.16 M/UL (ref 4.2–5.9)
WBC # BLD: 6.1 K/UL (ref 4–11)

## 2020-01-09 PROCEDURE — 85610 PROTHROMBIN TIME: CPT

## 2020-01-09 PROCEDURE — 99213 OFFICE O/P EST LOW 20 MIN: CPT

## 2020-01-09 NOTE — PROGRESS NOTES
increase  2.5 2.5 2.5 2.5 2.5 5 2.5 20   4/17 1.7 Below goal (held doses) 2.5 2.5 2.5 2.5 2.5 2.5 2.5 17.5  3/29 2.6 At goal, no change  2.5 2.5 2.5 2.5 2.5 2.5 2.5 17.5  3/14 2.7 At goal, no change  2.5 2.5 2.5 2.5 2.5 2.5 2.5 17.5  3/7 2.5 At goal, no change  2.5 2.5 2.5 2.5 2.5 2.5 2.5 17.5  3/2 2.02 On discharge from [facility] 2.5 2.5 2.5 2.5 2.5 2.5 2.5 17.5  2/4 2.4 At goal, no change  5 2.5 5 2.5 5 2.5 5 27.5  1/25 3.1 Above goal, decrease  5 2.5 5 2.5 5 2.5 5 27.5  1/11 2.8 At goal, no change  5 2.5 5 5 2.5 5 5 30  1/2 3.0 At goal, decrease  5 2.5 5 5 2.5 5 5          30          Last CBC:  Lab Results   Component Value Date    RBC 4.24 04/15/2019    HGB 12.9 (L) 04/15/2019    HCT 38.9 (L) 04/15/2019    MCV 91.8 04/15/2019    MCH 30.3 04/15/2019    MPV 8.4 04/15/2019    RDW 18.0 (H) 04/15/2019     04/15/2019       Patient History:  Recent hospitalizations/HC visits -9/17  ED for weakness, SOB: stopped Cymbalta due to suspected adverse effects; INR=2.2  -8/7  ED for sprained ankle    -7/10 [facility] ED for abdominal pain   -6/27 Epidural injection per Dr. Kostas Powell at [facility]  -4/11 Epidural injection at [id number] [facility]  -2/24-3/2 Admit [facility] for right toe + hip pain, diarrhea, VAL on CKD, elevated INR: patient was treated for pain and for C. diff; INR and creatinine stable on discharge    Recent medication changes 12/4 start Lexapro   9/17 d/c Cymbalta      Medications taken regularly that may interact with warfarin or alter INR ASA 81 mg, amiodarone 200 mg/d   Warfarin dose taken as prescribed Yes, does not use pillbox, but has own system of taking medications that works very well    Signs/symptoms of bleeding No h/o bleeding reported   Vitamin K intake -Started drinking Ensure (25% vit K) once daily in late September  -Normally has ~0 servings of green, leafy vegetables per week   Recent vomiting/diarrhea/fever, changes in weight or activity level Overall decreased appetite recently   Tobacco or alcohol use Patient quit smoking in 1955  Patient quit drinking alcohol at least 5 years ago   Upcoming surgeries or procedures Third epidural injection possible in future, but nothing scheduled currently     Assessment/Plan:  Patient's INR was therapeutic today (3.03) by lab draw. Patient and daughter deny any warfarin dosing errors, diet changes, medication changes, antibiotics/steroids, illness, bleeding, etc since last visit. Due to decreased appetite and poor diet, patient started drinking Ensure once daily at the end of September, which has not changed. POC INR in clinic was 4.6, but the CoaguChek meter indicated an error with the test.  According to the , the \"c\" error on the CoaguChek machine may be due to low hematocrit. Therefore, it is recommended to obtain venipuncture INR instead. I ordered STAT INR along with CBC to assess H&H. Although INR was therapeutic (3.03), hemoglobin and hematocrit were both low (8.1g/dL and 24.6%). Given that patient's baseline hemoglobin is ~11-13g/dL, and the most recent level I could find was 11.4g/dL in September, I contacted patient's PCP (Dr. Cici Holden) for advice. Due to patient's drop in Hgb, advanced age, and taking warfarin, she recommended that he go to ED for evaluation. Although patient denied any active bleeding in clinic, he does not have a GI physician that he could see urgently for eval.      Called patient's daughter (Elena Miller) and recommended that patient go to ED for evaluation. Informed her that significant drop in Hgb could indicate internal bleeding. She was agreeable to seeking emergency medical care. No warfarin dosing instructions given at this time. Will follow up with daughter after ED visit. Patient was reminded to maintain consistent vitamin K intake and call with any bleeding, medication changes, or fever/vomiting/diarrhea. Patient understands dosing directions and information discussed.   Dosing schedule and follow

## 2020-01-10 ENCOUNTER — HOSPITAL ENCOUNTER (INPATIENT)
Age: 85
LOS: 4 days | Discharge: HOME HEALTH | DRG: 378 | End: 2020-01-14
Attending: EMERGENCY MEDICINE | Admitting: INTERNAL MEDICINE
Payer: MEDICARE

## 2020-01-10 ENCOUNTER — APPOINTMENT (OUTPATIENT)
Dept: CT IMAGING | Age: 85
DRG: 378 | End: 2020-01-10
Payer: MEDICARE

## 2020-01-10 ENCOUNTER — APPOINTMENT (OUTPATIENT)
Dept: GENERAL RADIOLOGY | Age: 85
DRG: 378 | End: 2020-01-10
Payer: MEDICARE

## 2020-01-10 PROBLEM — D64.9 ANEMIA: Status: ACTIVE | Noted: 2020-01-10

## 2020-01-10 LAB
ABO/RH: NORMAL
ALBUMIN SERPL-MCNC: 3.4 G/DL (ref 3.4–5)
ALP BLD-CCNC: 67 U/L (ref 40–129)
ALT SERPL-CCNC: 20 U/L (ref 10–40)
ANION GAP SERPL CALCULATED.3IONS-SCNC: 13 MMOL/L (ref 3–16)
ANTIBODY SCREEN: NORMAL
AST SERPL-CCNC: 21 U/L (ref 15–37)
BASE EXCESS VENOUS: 1 (ref -3–3)
BASOPHILS ABSOLUTE: 0 K/UL (ref 0–0.2)
BASOPHILS RELATIVE PERCENT: 0.8 %
BILIRUB SERPL-MCNC: 0.3 MG/DL (ref 0–1)
BILIRUBIN DIRECT: <0.2 MG/DL (ref 0–0.3)
BILIRUBIN, INDIRECT: ABNORMAL MG/DL (ref 0–1)
BLOOD BANK DISPENSE STATUS: NORMAL
BLOOD BANK PRODUCT CODE: NORMAL
BPU ID: NORMAL
BUN BLDV-MCNC: 16 MG/DL (ref 7–20)
CALCIUM SERPL-MCNC: 8.4 MG/DL (ref 8.3–10.6)
CHLORIDE BLD-SCNC: 95 MMOL/L (ref 99–110)
CO2: 25 MMOL/L (ref 21–32)
CREAT SERPL-MCNC: 1.6 MG/DL (ref 0.8–1.3)
DESCRIPTION BLOOD BANK: NORMAL
EKG ATRIAL RATE: 62 BPM
EKG DIAGNOSIS: NORMAL
EKG Q-T INTERVAL: 486 MS
EKG QRS DURATION: 134 MS
EKG QTC CALCULATION (BAZETT): 497 MS
EKG R AXIS: 247 DEGREES
EKG T AXIS: 89 DEGREES
EKG VENTRICULAR RATE: 63 BPM
EOSINOPHILS ABSOLUTE: 0.1 K/UL (ref 0–0.6)
EOSINOPHILS RELATIVE PERCENT: 1.7 %
FERRITIN: 15.2 NG/ML (ref 30–400)
GFR AFRICAN AMERICAN: 50
GFR NON-AFRICAN AMERICAN: 41
GLUCOSE BLD-MCNC: 99 MG/DL (ref 70–99)
HCO3 VENOUS: 26 MMOL/L (ref 23–29)
HCT VFR BLD CALC: 23.1 % (ref 40.5–52.5)
HCT VFR BLD CALC: 28.3 % (ref 40.5–52.5)
HEMOGLOBIN: 7.4 G/DL (ref 13.5–17.5)
HEMOGLOBIN: 9.3 G/DL (ref 13.5–17.5)
INR BLD: 3.22 (ref 0.86–1.14)
IRON SATURATION: 13 % (ref 20–50)
IRON: 48 UG/DL (ref 59–158)
LACTATE: 1.01 MMOL/L (ref 0.4–2)
LACTIC ACID: 1.2 MMOL/L (ref 0.4–2)
LIPASE: 27 U/L (ref 13–60)
LYMPHOCYTES ABSOLUTE: 1.1 K/UL (ref 1–5.1)
LYMPHOCYTES RELATIVE PERCENT: 17.8 %
MCH RBC QN AUTO: 25.3 PG (ref 26–34)
MCHC RBC AUTO-ENTMCNC: 31.9 G/DL (ref 31–36)
MCV RBC AUTO: 79.3 FL (ref 80–100)
MONOCYTES ABSOLUTE: 0.7 K/UL (ref 0–1.3)
MONOCYTES RELATIVE PERCENT: 12.2 %
NEUTROPHILS ABSOLUTE: 4 K/UL (ref 1.7–7.7)
NEUTROPHILS RELATIVE PERCENT: 67.5 %
O2 SAT, VEN: 92 %
PCO2, VEN: 42.1 MM HG (ref 40–50)
PDW BLD-RTO: 17.2 % (ref 12.4–15.4)
PERFORMED ON: NORMAL
PH VENOUS: 7.4 (ref 7.35–7.45)
PLATELET # BLD: 264 K/UL (ref 135–450)
PMV BLD AUTO: 8.2 FL (ref 5–10.5)
PO2, VEN: 65 MM HG
POC SAMPLE TYPE: NORMAL
POTASSIUM SERPL-SCNC: 3.9 MMOL/L (ref 3.5–5.1)
PRO-BNP: 328 PG/ML (ref 0–449)
PROTHROMBIN TIME: 37.8 SEC (ref 10–13.2)
RBC # BLD: 2.92 M/UL (ref 4.2–5.9)
SODIUM BLD-SCNC: 133 MMOL/L (ref 136–145)
TCO2 CALC VENOUS: 27 MMOL/L
TOTAL IRON BINDING CAPACITY: 373 UG/DL (ref 260–445)
TOTAL PROTEIN: 6 G/DL (ref 6.4–8.2)
TROPONIN: 0.01 NG/ML
TROPONIN: 0.02 NG/ML
TROPONIN: 0.02 NG/ML
WBC # BLD: 5.9 K/UL (ref 4–11)

## 2020-01-10 PROCEDURE — 83690 ASSAY OF LIPASE: CPT

## 2020-01-10 PROCEDURE — 84484 ASSAY OF TROPONIN QUANT: CPT

## 2020-01-10 PROCEDURE — 83550 IRON BINDING TEST: CPT

## 2020-01-10 PROCEDURE — 36415 COLL VENOUS BLD VENIPUNCTURE: CPT

## 2020-01-10 PROCEDURE — 83880 ASSAY OF NATRIURETIC PEPTIDE: CPT

## 2020-01-10 PROCEDURE — 71046 X-RAY EXAM CHEST 2 VIEWS: CPT

## 2020-01-10 PROCEDURE — 86901 BLOOD TYPING SEROLOGIC RH(D): CPT

## 2020-01-10 PROCEDURE — 82803 BLOOD GASES ANY COMBINATION: CPT

## 2020-01-10 PROCEDURE — 86900 BLOOD TYPING SEROLOGIC ABO: CPT

## 2020-01-10 PROCEDURE — 6370000000 HC RX 637 (ALT 250 FOR IP): Performed by: STUDENT IN AN ORGANIZED HEALTH CARE EDUCATION/TRAINING PROGRAM

## 2020-01-10 PROCEDURE — C9113 INJ PANTOPRAZOLE SODIUM, VIA: HCPCS

## 2020-01-10 PROCEDURE — 82728 ASSAY OF FERRITIN: CPT

## 2020-01-10 PROCEDURE — 80048 BASIC METABOLIC PNL TOTAL CA: CPT

## 2020-01-10 PROCEDURE — 93005 ELECTROCARDIOGRAM TRACING: CPT | Performed by: INTERNAL MEDICINE

## 2020-01-10 PROCEDURE — 85025 COMPLETE CBC W/AUTO DIFF WBC: CPT

## 2020-01-10 PROCEDURE — 1200000000 HC SEMI PRIVATE

## 2020-01-10 PROCEDURE — 86923 COMPATIBILITY TEST ELECTRIC: CPT

## 2020-01-10 PROCEDURE — 86850 RBC ANTIBODY SCREEN: CPT

## 2020-01-10 PROCEDURE — 85014 HEMATOCRIT: CPT

## 2020-01-10 PROCEDURE — C9113 INJ PANTOPRAZOLE SODIUM, VIA: HCPCS | Performed by: EMERGENCY MEDICINE

## 2020-01-10 PROCEDURE — 36430 TRANSFUSION BLD/BLD COMPNT: CPT | Performed by: NURSE PRACTITIONER

## 2020-01-10 PROCEDURE — 83605 ASSAY OF LACTIC ACID: CPT

## 2020-01-10 PROCEDURE — 6360000002 HC RX W HCPCS: Performed by: EMERGENCY MEDICINE

## 2020-01-10 PROCEDURE — 85018 HEMOGLOBIN: CPT

## 2020-01-10 PROCEDURE — 6360000002 HC RX W HCPCS

## 2020-01-10 PROCEDURE — 80076 HEPATIC FUNCTION PANEL: CPT

## 2020-01-10 PROCEDURE — 85610 PROTHROMBIN TIME: CPT

## 2020-01-10 PROCEDURE — 74177 CT ABD & PELVIS W/CONTRAST: CPT

## 2020-01-10 PROCEDURE — 83540 ASSAY OF IRON: CPT

## 2020-01-10 PROCEDURE — 99285 EMERGENCY DEPT VISIT HI MDM: CPT

## 2020-01-10 PROCEDURE — 6360000004 HC RX CONTRAST MEDICATION: Performed by: EMERGENCY MEDICINE

## 2020-01-10 PROCEDURE — 2580000003 HC RX 258: Performed by: EMERGENCY MEDICINE

## 2020-01-10 PROCEDURE — 2580000003 HC RX 258: Performed by: STUDENT IN AN ORGANIZED HEALTH CARE EDUCATION/TRAINING PROGRAM

## 2020-01-10 PROCEDURE — P9016 RBC LEUKOCYTES REDUCED: HCPCS

## 2020-01-10 RX ORDER — AMIODARONE HYDROCHLORIDE 200 MG/1
200 TABLET ORAL DAILY
Status: DISCONTINUED | OUTPATIENT
Start: 2020-01-10 | End: 2020-01-14 | Stop reason: HOSPADM

## 2020-01-10 RX ORDER — PANTOPRAZOLE SODIUM 40 MG/10ML
40 INJECTION, POWDER, LYOPHILIZED, FOR SOLUTION INTRAVENOUS ONCE
Status: DISCONTINUED | OUTPATIENT
Start: 2020-01-10 | End: 2020-01-10

## 2020-01-10 RX ORDER — PANTOPRAZOLE SODIUM 40 MG/10ML
40 INJECTION, POWDER, LYOPHILIZED, FOR SOLUTION INTRAVENOUS 2 TIMES DAILY
Status: DISCONTINUED | OUTPATIENT
Start: 2020-01-10 | End: 2020-01-14 | Stop reason: HOSPADM

## 2020-01-10 RX ORDER — FUROSEMIDE 40 MG/1
20 TABLET ORAL DAILY
Status: DISCONTINUED | OUTPATIENT
Start: 2020-01-10 | End: 2020-01-10

## 2020-01-10 RX ORDER — 0.9 % SODIUM CHLORIDE 0.9 %
250 INTRAVENOUS SOLUTION INTRAVENOUS ONCE
Status: COMPLETED | OUTPATIENT
Start: 2020-01-10 | End: 2020-01-10

## 2020-01-10 RX ORDER — SODIUM CHLORIDE 0.9 % (FLUSH) 0.9 %
10 SYRINGE (ML) INJECTION EVERY 12 HOURS SCHEDULED
Status: DISCONTINUED | OUTPATIENT
Start: 2020-01-10 | End: 2020-01-14 | Stop reason: HOSPADM

## 2020-01-10 RX ORDER — ACETAMINOPHEN 325 MG/1
650 TABLET ORAL EVERY 4 HOURS PRN
Status: DISCONTINUED | OUTPATIENT
Start: 2020-01-10 | End: 2020-01-14

## 2020-01-10 RX ORDER — ESCITALOPRAM OXALATE 10 MG/1
10 TABLET ORAL DAILY
Status: DISCONTINUED | OUTPATIENT
Start: 2020-01-10 | End: 2020-01-14 | Stop reason: HOSPADM

## 2020-01-10 RX ORDER — ONDANSETRON 2 MG/ML
4 INJECTION INTRAMUSCULAR; INTRAVENOUS EVERY 6 HOURS PRN
Status: DISCONTINUED | OUTPATIENT
Start: 2020-01-10 | End: 2020-01-14 | Stop reason: HOSPADM

## 2020-01-10 RX ORDER — DILTIAZEM HYDROCHLORIDE 300 MG/1
300 CAPSULE, COATED, EXTENDED RELEASE ORAL DAILY
Status: DISCONTINUED | OUTPATIENT
Start: 2020-01-11 | End: 2020-01-14 | Stop reason: HOSPADM

## 2020-01-10 RX ORDER — SODIUM CHLORIDE 0.9 % (FLUSH) 0.9 %
10 SYRINGE (ML) INJECTION PRN
Status: DISCONTINUED | OUTPATIENT
Start: 2020-01-10 | End: 2020-01-14 | Stop reason: HOSPADM

## 2020-01-10 RX ORDER — ATORVASTATIN CALCIUM 40 MG/1
80 TABLET, FILM COATED ORAL DAILY
Status: DISCONTINUED | OUTPATIENT
Start: 2020-01-10 | End: 2020-01-14 | Stop reason: HOSPADM

## 2020-01-10 RX ADMIN — ATORVASTATIN CALCIUM 80 MG: 40 TABLET, FILM COATED ORAL at 18:48

## 2020-01-10 RX ADMIN — IOPAMIDOL 80 ML: 755 INJECTION, SOLUTION INTRAVENOUS at 13:07

## 2020-01-10 RX ADMIN — PANTOPRAZOLE SODIUM 40 MG: 40 INJECTION, POWDER, FOR SOLUTION INTRAVENOUS at 21:03

## 2020-01-10 RX ADMIN — SODIUM CHLORIDE 250 ML: 9 INJECTION, SOLUTION INTRAVENOUS at 13:26

## 2020-01-10 RX ADMIN — PANTOPRAZOLE SODIUM 40 MG: 40 INJECTION, POWDER, FOR SOLUTION INTRAVENOUS at 15:21

## 2020-01-10 RX ADMIN — AMIODARONE HYDROCHLORIDE 200 MG: 200 TABLET ORAL at 18:49

## 2020-01-10 RX ADMIN — Medication 10 ML: at 21:04

## 2020-01-10 RX ADMIN — ESCITALOPRAM OXALATE 10 MG: 10 TABLET ORAL at 18:48

## 2020-01-10 ASSESSMENT — PAIN DESCRIPTION - DESCRIPTORS: DESCRIPTORS: RADIATING;ACHING

## 2020-01-10 ASSESSMENT — PAIN DESCRIPTION - LOCATION: LOCATION: ABDOMEN

## 2020-01-10 ASSESSMENT — PAIN SCALES - GENERAL
PAINLEVEL_OUTOF10: 0
PAINLEVEL_OUTOF10: 10

## 2020-01-10 ASSESSMENT — PAIN DESCRIPTION - PAIN TYPE: TYPE: ACUTE PAIN

## 2020-01-10 ASSESSMENT — PAIN DESCRIPTION - FREQUENCY: FREQUENCY: CONTINUOUS

## 2020-01-10 NOTE — PROGRESS NOTES
Pt received to [address] from ED. VSS with the exception of elevated blood pressure. Pt oriented to room and instructed on use of call light and how to use it appropriately. A&O x4. Pts bed locked in lowest position with bed alarm on. Will continue to monitor.

## 2020-01-10 NOTE — PLAN OF CARE
Problem: Falls - Risk of:  Goal: Will remain free from falls  Description  Will remain free from falls  Outcome: Ongoing  Note:   Pt at risk for falls. Pts bed is locked in lowest position with bed alarm on. Non skid footwear applied. Side rails up 2/4. Pt personal belongings and call light within reach. Pt calls appropriately. Will continue to monitor. Problem: Pain:  Goal: Pain level will decrease  Description  Pain level will decrease  Outcome: Ongoing  Note:   Pt c/o of 10/10 pain coming from the \"inside out\" of abdomen. Pt consulted to GI. Pt did not desire anything for relief at this time. Will continue to monitor.

## 2020-01-10 NOTE — ED NOTES
Consent obtained from daughter for transfusion of blood products.      Janett Fisher RN  01/10/20 [id number]

## 2020-01-10 NOTE — H&P
Internal Medicine  PGY 1  History & Physical      CC Anemia on routine labs    History Obtained From:  patient    HISTORY OF PRESENT ILLNESS:  80 M PMH paroxysmal atrial fibrillation on coumadin, diastolic HF, HTN presents to ED on referral from coumadin clinic for anemia far below baseline at Hgb 8.1. Patient states that he has been increasingly short of breath with exertion for the last 2 months and that in the last few days SOB has worsened. He denies orthopnea, PND, reports stable LE swelling, denies changes in weight. Denies melena, hematochezia, BRBPR, hematemesis. Reports abdominal pain in bilateral lower quadrants for the last two months that comes on with exertion, is 8/10, radiates to the back and right shoulder, improves with rest or laying down. Denies diarrhea, nausea, vomiting, fevers or chills, states he has not had an appetite for the same duration that he has had the pain. Patient has not had a colonoscopy in the past, does not follow GI  On presentation to the ED, he was afebrile and hemodynamically stable. Hgb 7.4 today, he received 250 cc bolus, and 1U PRBC in the ED. Past Medical History:        Diagnosis Date    (HFpEF) heart failure with preserved ejection fraction (HCC)     CAD (coronary artery disease)     Central retinal artery occlusion     Clostridium difficile diarrhea 02/25/2019    Hypertension     PAF (paroxysmal atrial fibrillation) (HCC)     PMR (polymyalgia rheumatica) (HCC)    ·     Past Surgical History:    · History reviewed. No pertinent surgical history. Medications Priorto Admission:    · Not in a hospital admission. Allergies:  Patient has no known allergies. Social History:   · TOBACCO:   reports that he quit smoking about 33 years ago. He has a 34.00 pack-year smoking history. He has never used smokeless tobacco.  · ETOH:   reports no history of alcohol use.   · DRUGS : None reported  · Patient currently lives at home  ·   Family History:       Problem LABCAST, WBCUA, RBCUA, MUCUS, TRICHOMONAS, YEAST, BACTERIA, CLARITYU, SPECGRAV, LEUKOCYTESUR, UROBILINOGEN, BILIRUBINUR, BLOODU, GLUCOSEU, AMORPHOUS in the last 72 hours. Invalid input(s): KETONESU    CT ABDOMEN PELVIS W IV CONTRAST Additional Contrast? None   Final Result      Mild prominence of intrahepatic biliary ducts noted diffusely. This is of doubtful significance but should be correlated clinically. Mild bibasilar atelectasis or infiltrate slightly more prominent on the left. Extensive vascular including coronary calcification. Otherwise no acute abnormality identified. XR CHEST STANDARD (2 VW)   Final Result   1. No acute cardiopulmonary abnormality. 2. Age-indeterminate compression fracture at T6 new in comparison to the previous chest x-ray. MRI may be of benefit to evaluate for acute marrow edema. ASSESSMENT AND PLAN:  80 M PMH paroxysmal atrial fibrillation, diastolic HF (EF >24% in 23/86) presents after he was found to be anemic to Hgb 8.1 on routine labs at coumadin clinic, currently with symptomatic anemia associated with SOB, hemodynamically stable. Admitted for further management. Symptomatic anemia  Patient does not report GI symptoms of bleeding. He does have abdominal pain but CT abdomen pelvis with IV contrast negative for internal bleeding  He has never had a colonoscopy done, does have brother with colon cancer  Given microcytic anemia, could be iron deficiency vs GI loss from malignancy, vascular lesion, or ulcer. FOBT was trace positive in ED  - Transfuse 1U PRBC, f/u post transfusion H&H, goal Hgb >8  - Will check iron, TIBC, ferritin, vitamin B12  - H&H q12h, daily CBC  - Consult GI for endoscopy, clear liquid diet  - Will hold off on protonix as no upper GI bleeding symptoms, appreciate GI input    Abdominal pain  Reports pain with exertion or movement, and states does not have an appetite.  Reported pain is out of proportion to the benign physical exam. Though presentation cw mesenteric ischemia, CT abdomen w IV contrast negative. Could be intestinal angina  - Continue lipitor    Paroxysmal atrial fibrillation  Currently NSR  - Hold coumadin until GI evaluation and recommendations  - Continue cardizem, amiodarone    Chronic diastolic heart failure  Not in acute decompensation.  Has some lower extremity edema  - Hold lasix and aldactone as he has CKD    Anxiety  - Continue lexapro    Will discuss with attending physician Dr. Trevon Broussard Status:Full code  FEN: NPO until GI eval, protonix  PPX: SCDs  DISPO: IP    Ana Sung MD  1/10/2020,  2:09 PM

## 2020-01-10 NOTE — ED PROVIDER NOTES
Date of evaluation: 1/10/2020    Chief Complaint   Anemia (patient in for anemia )      Nursing Notes, Past Medical Hx, Past Surgical Hx, Social Hx, Allergies, and Family Hx were reviewed. History of Present Illness     Idania Olmstead is a 80 y.o. male who presents because of anemia. Routine blood show that his blood count had dropped so was sent to the ED. They said was 8.4. He is on Coumadin secondary to atrial fibrillation. He does have increasing shortness of breath and dyspnea on exertion. Patient is not a great historian history is from wife. They have not noted any blood in stool. His hemoglobin in September was 12. Denies chest pain complaining of lower abdominal pain. He is trying to describe it like seem to be bandlike. Could not quantify the score. No modifying factors or associated signs or symptoms. Review of Systems     Review of Systems     Positive shortness of breath. No chest pain. Denies UTI symptoms  Blood no bright red blood per rectum. Otherwise negative view of systems as per patient is treating review    Past Medical, Surgical, Family, and Social History     He has a past medical history of (HFpEF) heart failure with preserved ejection fraction ([address] CAD (coronary artery disease), Central retinal artery occlusion, Clostridium difficile diarrhea, Hypertension, PAF (paroxysmal atrial fibrillation) ([address] and PMR (polymyalgia rheumatica) ([address] He has no past surgical history on file. His family history is not on file. He reports that he quit smoking about 33 years ago. He has a 34.00 pack-year smoking history. He has never used smokeless tobacco. He reports that he does not drink alcohol or use drugs.     Medications     Previous Medications    AMIODARONE (CORDARONE) 200 MG TABLET    TAKE ONE TABLET BY MOUTH DAILY    ATORVASTATIN (LIPITOR) 80 MG TABLET    TAKE ONE TABLET BY MOUTH DAILY    DILTIAZEM (CARTIA XT) 300 MG EXTENDED RELEASE CAPSULE    TAKE ONE CAPSULE BY PANEL - Abnormal; Notable for the following components:    Sodium 133 (*)     Chloride 95 (*)     CREATININE 1.6 (*)     GFR Non- 41 (*)     GFR  50 (*)     All other components within normal limits    Narrative:     Performed at: [facility]  [address],  [place], [address]   Phone [phone]   LIPASE    Narrative:     Performed at: [facility]  [address],  [place], [address]   Phone [phone]   BRAIN NATRIURETIC PEPTIDE    Narrative:     Performed at: [facility]  [address],  [place], [address]   Phone [phone]   LACTIC ACID, PLASMA   BLOOD GAS, VENOUS   POCT OCCULT BLOOD STOOL NON CA SCREEN   POCT VENOUS    Narrative:     Performed at: [facility]  [address],  [place], [address]   Phone [phone]   TYPE AND SCREEN    Narrative:     Performed at: [facility]  [address],  [place], [address]   Phone [phone]   PREPARE RBC (CROSSMATCH)       BEDSIDE ULTRASOUND:      VITALS:  BP: 133/73, Temp: 98.4 °F (36.9 °C), Pulse: 64, Resp: 14     Procedures         ED Course     The patient was given the followingmedications:  Orders Placed This Encounter   Medications    0.9 % sodium chloride bolus    iopamidol (ISOVUE-370) 76 % injection 80 mL            CONSULTS:  IP CONSULT TO HOSPITALIST  IP CONSULT TO GI    MEDICAL DECISION MAKING     Isabel Garza is a 80 y.o. male who comes in with repeat hemoglobin testing of 7.4 and trace Hemoccult positive stool without was not bright red blood or dark in color. Transfuse 1 unit ordered here in the ED. Patient will be admitted for further work-up of anemia. Shortness of breath most likely secondary to anemia. Troponin was obtained and slightly dated 0.02.   He had no

## 2020-01-11 LAB
ANION GAP SERPL CALCULATED.3IONS-SCNC: 13 MMOL/L (ref 3–16)
BASOPHILS ABSOLUTE: 0.1 K/UL (ref 0–0.2)
BASOPHILS RELATIVE PERCENT: 1 %
BUN BLDV-MCNC: 12 MG/DL (ref 7–20)
CALCIUM SERPL-MCNC: 9 MG/DL (ref 8.3–10.6)
CHLORIDE BLD-SCNC: 95 MMOL/L (ref 99–110)
CO2: 26 MMOL/L (ref 21–32)
CREAT SERPL-MCNC: 1.2 MG/DL (ref 0.8–1.3)
EOSINOPHILS ABSOLUTE: 0.2 K/UL (ref 0–0.6)
EOSINOPHILS RELATIVE PERCENT: 2.1 %
GFR AFRICAN AMERICAN: >60
GFR NON-AFRICAN AMERICAN: 57
GLUCOSE BLD-MCNC: 90 MG/DL (ref 70–99)
HCT VFR BLD CALC: 29.4 % (ref 40.5–52.5)
HCT VFR BLD CALC: 31 % (ref 40.5–52.5)
HEMOGLOBIN: 10.1 G/DL (ref 13.5–17.5)
HEMOGLOBIN: 9.7 G/DL (ref 13.5–17.5)
INR BLD: 1.73 (ref 0.86–1.14)
LYMPHOCYTES ABSOLUTE: 1.7 K/UL (ref 1–5.1)
LYMPHOCYTES RELATIVE PERCENT: 22.4 %
MCH RBC QN AUTO: 26 PG (ref 26–34)
MCHC RBC AUTO-ENTMCNC: 32.7 G/DL (ref 31–36)
MCV RBC AUTO: 79.7 FL (ref 80–100)
MONOCYTES ABSOLUTE: 0.8 K/UL (ref 0–1.3)
MONOCYTES RELATIVE PERCENT: 10.3 %
NEUTROPHILS ABSOLUTE: 5 K/UL (ref 1.7–7.7)
NEUTROPHILS RELATIVE PERCENT: 64.2 %
PDW BLD-RTO: 16.8 % (ref 12.4–15.4)
PLATELET # BLD: 296 K/UL (ref 135–450)
PMV BLD AUTO: 7.6 FL (ref 5–10.5)
POTASSIUM REFLEX MAGNESIUM: 4.2 MMOL/L (ref 3.5–5.1)
PROTHROMBIN TIME: 20.2 SEC (ref 10–13.2)
RBC # BLD: 3.89 M/UL (ref 4.2–5.9)
SODIUM BLD-SCNC: 134 MMOL/L (ref 136–145)
WBC # BLD: 7.8 K/UL (ref 4–11)

## 2020-01-11 PROCEDURE — 80048 BASIC METABOLIC PNL TOTAL CA: CPT

## 2020-01-11 PROCEDURE — 85025 COMPLETE CBC W/AUTO DIFF WBC: CPT

## 2020-01-11 PROCEDURE — 6360000002 HC RX W HCPCS

## 2020-01-11 PROCEDURE — 1200000000 HC SEMI PRIVATE

## 2020-01-11 PROCEDURE — C9113 INJ PANTOPRAZOLE SODIUM, VIA: HCPCS

## 2020-01-11 PROCEDURE — 2580000003 HC RX 258: Performed by: STUDENT IN AN ORGANIZED HEALTH CARE EDUCATION/TRAINING PROGRAM

## 2020-01-11 PROCEDURE — 85610 PROTHROMBIN TIME: CPT

## 2020-01-11 PROCEDURE — 6370000000 HC RX 637 (ALT 250 FOR IP): Performed by: STUDENT IN AN ORGANIZED HEALTH CARE EDUCATION/TRAINING PROGRAM

## 2020-01-11 PROCEDURE — 85018 HEMOGLOBIN: CPT

## 2020-01-11 PROCEDURE — 36415 COLL VENOUS BLD VENIPUNCTURE: CPT

## 2020-01-11 PROCEDURE — 2580000003 HC RX 258

## 2020-01-11 PROCEDURE — 85014 HEMATOCRIT: CPT

## 2020-01-11 RX ORDER — SODIUM CHLORIDE, SODIUM LACTATE, POTASSIUM CHLORIDE, CALCIUM CHLORIDE 600; 310; 30; 20 MG/100ML; MG/100ML; MG/100ML; MG/100ML
INJECTION, SOLUTION INTRAVENOUS CONTINUOUS
Status: DISCONTINUED | OUTPATIENT
Start: 2020-01-11 | End: 2020-01-12

## 2020-01-11 RX ORDER — FERROUS SULFATE 325(65) MG
325 TABLET ORAL
Status: DISCONTINUED | OUTPATIENT
Start: 2020-01-11 | End: 2020-01-11

## 2020-01-11 RX ADMIN — ATORVASTATIN CALCIUM 80 MG: 40 TABLET, FILM COATED ORAL at 09:30

## 2020-01-11 RX ADMIN — PANTOPRAZOLE SODIUM 40 MG: 40 INJECTION, POWDER, FOR SOLUTION INTRAVENOUS at 09:29

## 2020-01-11 RX ADMIN — DILTIAZEM HYDROCHLORIDE 300 MG: 300 CAPSULE, COATED, EXTENDED RELEASE ORAL at 10:13

## 2020-01-11 RX ADMIN — Medication 10 ML: at 20:23

## 2020-01-11 RX ADMIN — Medication 10 ML: at 07:54

## 2020-01-11 RX ADMIN — SODIUM CHLORIDE, POTASSIUM CHLORIDE, SODIUM LACTATE AND CALCIUM CHLORIDE: 600; 310; 30; 20 INJECTION, SOLUTION INTRAVENOUS at 14:25

## 2020-01-11 RX ADMIN — AMIODARONE HYDROCHLORIDE 200 MG: 200 TABLET ORAL at 09:30

## 2020-01-11 RX ADMIN — ESCITALOPRAM OXALATE 10 MG: 10 TABLET ORAL at 09:30

## 2020-01-11 RX ADMIN — PANTOPRAZOLE SODIUM 40 MG: 40 INJECTION, POWDER, FOR SOLUTION INTRAVENOUS at 20:21

## 2020-01-11 RX ADMIN — ACETAMINOPHEN 650 MG: 325 TABLET ORAL at 05:52

## 2020-01-11 ASSESSMENT — PAIN SCALES - GENERAL
PAINLEVEL_OUTOF10: 0
PAINLEVEL_OUTOF10: 4
PAINLEVEL_OUTOF10: 4
PAINLEVEL_OUTOF10: 2
PAINLEVEL_OUTOF10: 0
PAINLEVEL_OUTOF10: 0

## 2020-01-11 ASSESSMENT — PAIN DESCRIPTION - FREQUENCY
FREQUENCY: CONTINUOUS
FREQUENCY: CONTINUOUS

## 2020-01-11 ASSESSMENT — PAIN DESCRIPTION - PAIN TYPE
TYPE: ACUTE PAIN
TYPE: ACUTE PAIN

## 2020-01-11 ASSESSMENT — PAIN SCALES - WONG BAKER: WONGBAKER_NUMERICALRESPONSE: 0

## 2020-01-11 ASSESSMENT — PAIN DESCRIPTION - DESCRIPTORS
DESCRIPTORS: RADIATING;ACHING
DESCRIPTORS: RADIATING

## 2020-01-11 ASSESSMENT — PAIN DESCRIPTION - LOCATION
LOCATION: ABDOMEN
LOCATION: ABDOMEN

## 2020-01-11 NOTE — CONSULTS
GI: Mr Chula Gonzalez was seen, evaluated and examined  Entire records were reviewed    ASS:  Acute anemia, suspect gi blood loss  Decreased appetite  Abdominal pain  At fibrillation.  On coumadin    Plan:  egds and colonoscopy Monday am   Needs to see INR 1.4 or lower prior  Discussed with patient    Thank you Dr Ángel Monsalve m.d.  1-11-20

## 2020-01-11 NOTE — PROGRESS NOTES
Progress Note    Admit Date: 1/10/2020  Day: 2  Diet: Diet NPO Effective Now Exceptions are: Ice Chips, Sips with Meds    CC: fatigue    Interval history: No acute events overnight. Pt seen and examined at bedside. Doing well this morning, but feels about the same. Pain still present, worse when sitting up and present across lower abdominal area. Pain actually improves a bit with defecation. No fevers, chills, N/V. Medications:     Scheduled Meds:   amiodarone  200 mg Oral Daily    atorvastatin  80 mg Oral Daily    diltiazem  300 mg Oral Daily    escitalopram  10 mg Oral Daily    sodium chloride flush  10 mL Intravenous 2 times per day    pantoprazole  40 mg Intravenous BID     Continuous Infusions:  PRN Meds:sodium chloride flush, acetaminophen, ondansetron    Objective:   Vitals:   T-max:  Patient Vitals for the past 8 hrs:   BP Temp Temp src Pulse Resp SpO2   01/11/20 1230 (!) 157/71 97.3 °F (36.3 °C) Oral 65 16 94 %   01/11/20 0741 (!) 166/74 97 °F (36.1 °C) Oral 64 16 95 %   01/11/20 0545 (!) 165/76 97.8 °F (36.6 °C) Oral 62 16 94 %       Intake/Output Summary (Last 24 hours) at 1/11/2020 1246  Last data filed at 1/10/2020 2323  Gross per 24 hour   Intake 300 ml   Output --   Net 300 ml       Physical Exam  Constitutional:       General: He is not in acute distress. Appearance: He is not ill-appearing. Comments: Appears pale   Cardiovascular:      Rate and Rhythm: Normal rate and regular rhythm. Heart sounds: No murmur. No friction rub. No gallop. Pulmonary:      Effort: Pulmonary effort is normal. No respiratory distress. Breath sounds: Normal breath sounds. No wheezing, rhonchi or rales. Abdominal:      General: Bowel sounds are normal. There is no distension. Palpations: Abdomen is soft. Tenderness: There is no tenderness. There is no guarding or rebound. Musculoskeletal:      Right lower leg: Edema present. Left lower leg: Edema present.       Comments: 10.3 today after 1U pRBC. Microcytic. He has never had a colonoscopy done, does have brother with colon cancer. Iron studies show depletion. FOBT was trace positive in ED, repeating.  - Transfuse 1U PRBC, f/u post transfusion H&H, goal Hgb >8  - H&H q12h  - Consult GI for endoscopy, NPO  - Will hold off on protonix gtt as no upper GI bleeding symptoms, appreciate GI input     Abdominal pain - Reports pain with exertion or movement, and states does not have an appetite. Reported pain is out of proportion to the benign physical exam. Though presentation cw mesenteric ischemia, CT abdomen w IV contrast negative. Lactate normal. Could be intestinal angina. Pain better with defecation, but will need further workup.  - Continue lipitor     Paroxysmal atrial fibrillation  - Hold coumadin until GI evaluation and recommendations  - Continue cardizem, amiodarone     Chronic diastolic heart failure - Not in acute decompensation.  Has some lower extremity edema  - Hold lasix and aldactone as he has CKD     Anxiety  - Continue lexapro     Will discuss with attending physician Dr. Frida Longoria    Code Status: FULL  FEN: NPO, LR @ 50 cc/h  PPX: Protonix, SCDs  DISPO: Sheryl Kaur MD, PGY-2  01/11/20  12:46 PM    This patient has been staffed and discussed with Nicolette Fermin MD.

## 2020-01-12 ENCOUNTER — APPOINTMENT (OUTPATIENT)
Dept: CT IMAGING | Age: 85
DRG: 378 | End: 2020-01-12
Payer: MEDICARE

## 2020-01-12 ENCOUNTER — APPOINTMENT (OUTPATIENT)
Dept: GENERAL RADIOLOGY | Age: 85
DRG: 378 | End: 2020-01-12
Payer: MEDICARE

## 2020-01-12 LAB
ANION GAP SERPL CALCULATED.3IONS-SCNC: 15 MMOL/L (ref 3–16)
BASOPHILS ABSOLUTE: 0.1 K/UL (ref 0–0.2)
BASOPHILS RELATIVE PERCENT: 0.6 %
BUN BLDV-MCNC: 12 MG/DL (ref 7–20)
CALCIUM SERPL-MCNC: 8.6 MG/DL (ref 8.3–10.6)
CHLORIDE BLD-SCNC: 93 MMOL/L (ref 99–110)
CO2: 21 MMOL/L (ref 21–32)
CREAT SERPL-MCNC: 1.2 MG/DL (ref 0.8–1.3)
EOSINOPHILS ABSOLUTE: 0 K/UL (ref 0–0.6)
EOSINOPHILS RELATIVE PERCENT: 0.6 %
GFR AFRICAN AMERICAN: >60
GFR NON-AFRICAN AMERICAN: 57
GLUCOSE BLD-MCNC: 106 MG/DL (ref 70–99)
HCT VFR BLD CALC: 29.8 % (ref 40.5–52.5)
HEMOGLOBIN: 9.9 G/DL (ref 13.5–17.5)
INR BLD: 1.34 (ref 0.86–1.14)
LYMPHOCYTES ABSOLUTE: 0.8 K/UL (ref 1–5.1)
LYMPHOCYTES RELATIVE PERCENT: 10 %
MCH RBC QN AUTO: 26.4 PG (ref 26–34)
MCHC RBC AUTO-ENTMCNC: 33.1 G/DL (ref 31–36)
MCV RBC AUTO: 79.7 FL (ref 80–100)
MONOCYTES ABSOLUTE: 0.8 K/UL (ref 0–1.3)
MONOCYTES RELATIVE PERCENT: 9.8 %
NEUTROPHILS ABSOLUTE: 6.5 K/UL (ref 1.7–7.7)
NEUTROPHILS RELATIVE PERCENT: 79 %
PDW BLD-RTO: 16.8 % (ref 12.4–15.4)
PLATELET # BLD: 265 K/UL (ref 135–450)
PMV BLD AUTO: 7.6 FL (ref 5–10.5)
POTASSIUM REFLEX MAGNESIUM: 3.7 MMOL/L (ref 3.5–5.1)
PROTHROMBIN TIME: 15.6 SEC (ref 10–13.2)
RBC # BLD: 3.74 M/UL (ref 4.2–5.9)
SODIUM BLD-SCNC: 129 MMOL/L (ref 136–145)
SODIUM BLD-SCNC: 130 MMOL/L (ref 136–145)
WBC # BLD: 8.3 K/UL (ref 4–11)

## 2020-01-12 PROCEDURE — C9113 INJ PANTOPRAZOLE SODIUM, VIA: HCPCS

## 2020-01-12 PROCEDURE — 6370000000 HC RX 637 (ALT 250 FOR IP): Performed by: INTERNAL MEDICINE

## 2020-01-12 PROCEDURE — 1200000000 HC SEMI PRIVATE

## 2020-01-12 PROCEDURE — 94150 VITAL CAPACITY TEST: CPT

## 2020-01-12 PROCEDURE — 71045 X-RAY EXAM CHEST 1 VIEW: CPT

## 2020-01-12 PROCEDURE — 6370000000 HC RX 637 (ALT 250 FOR IP): Performed by: STUDENT IN AN ORGANIZED HEALTH CARE EDUCATION/TRAINING PROGRAM

## 2020-01-12 PROCEDURE — 85025 COMPLETE CBC W/AUTO DIFF WBC: CPT

## 2020-01-12 PROCEDURE — 85610 PROTHROMBIN TIME: CPT

## 2020-01-12 PROCEDURE — 36415 COLL VENOUS BLD VENIPUNCTURE: CPT

## 2020-01-12 PROCEDURE — 84295 ASSAY OF SERUM SODIUM: CPT

## 2020-01-12 PROCEDURE — 94760 N-INVAS EAR/PLS OXIMETRY 1: CPT

## 2020-01-12 PROCEDURE — 6360000002 HC RX W HCPCS

## 2020-01-12 PROCEDURE — 2580000003 HC RX 258

## 2020-01-12 PROCEDURE — 2580000003 HC RX 258: Performed by: STUDENT IN AN ORGANIZED HEALTH CARE EDUCATION/TRAINING PROGRAM

## 2020-01-12 PROCEDURE — 80048 BASIC METABOLIC PNL TOTAL CA: CPT

## 2020-01-12 PROCEDURE — 70450 CT HEAD/BRAIN W/O DYE: CPT

## 2020-01-12 RX ORDER — LIDOCAINE 4 G/G
1 PATCH TOPICAL DAILY
Status: DISCONTINUED | OUTPATIENT
Start: 2020-01-12 | End: 2020-01-14 | Stop reason: HOSPADM

## 2020-01-12 RX ORDER — FUROSEMIDE 20 MG/1
20 TABLET ORAL DAILY
Status: DISCONTINUED | OUTPATIENT
Start: 2020-01-12 | End: 2020-01-14 | Stop reason: HOSPADM

## 2020-01-12 RX ADMIN — ESCITALOPRAM OXALATE 10 MG: 10 TABLET ORAL at 08:46

## 2020-01-12 RX ADMIN — SODIUM CHLORIDE, POTASSIUM CHLORIDE, SODIUM LACTATE AND CALCIUM CHLORIDE: 600; 310; 30; 20 INJECTION, SOLUTION INTRAVENOUS at 08:51

## 2020-01-12 RX ADMIN — MAGNESIUM CITRATE 296 ML: 1.75 LIQUID ORAL at 11:42

## 2020-01-12 RX ADMIN — ATORVASTATIN CALCIUM 80 MG: 40 TABLET, FILM COATED ORAL at 08:46

## 2020-01-12 RX ADMIN — FUROSEMIDE 20 MG: 20 TABLET ORAL at 08:46

## 2020-01-12 RX ADMIN — AMIODARONE HYDROCHLORIDE 200 MG: 200 TABLET ORAL at 08:46

## 2020-01-12 RX ADMIN — POLYETHYLENE GLYCOL 3350, SODIUM SULFATE ANHYDROUS, SODIUM BICARBONATE, SODIUM CHLORIDE, POTASSIUM CHLORIDE 4000 ML: 236; 22.74; 6.74; 5.86; 2.97 POWDER, FOR SOLUTION ORAL at 16:03

## 2020-01-12 RX ADMIN — DILTIAZEM HYDROCHLORIDE 300 MG: 300 CAPSULE, COATED, EXTENDED RELEASE ORAL at 10:21

## 2020-01-12 RX ADMIN — Medication 10 ML: at 08:47

## 2020-01-12 RX ADMIN — PANTOPRAZOLE SODIUM 40 MG: 40 INJECTION, POWDER, FOR SOLUTION INTRAVENOUS at 20:24

## 2020-01-12 RX ADMIN — Medication 10 ML: at 20:26

## 2020-01-12 RX ADMIN — ACETAMINOPHEN 650 MG: 325 TABLET ORAL at 07:01

## 2020-01-12 RX ADMIN — PANTOPRAZOLE SODIUM 40 MG: 40 INJECTION, POWDER, FOR SOLUTION INTRAVENOUS at 08:46

## 2020-01-12 ASSESSMENT — PAIN DESCRIPTION - PROGRESSION
CLINICAL_PROGRESSION: NOT CHANGED
CLINICAL_PROGRESSION: GRADUALLY WORSENING
CLINICAL_PROGRESSION: NOT CHANGED

## 2020-01-12 ASSESSMENT — PAIN SCALES - WONG BAKER
WONGBAKER_NUMERICALRESPONSE: 0

## 2020-01-12 ASSESSMENT — PAIN SCALES - GENERAL
PAINLEVEL_OUTOF10: 3
PAINLEVEL_OUTOF10: 0
PAINLEVEL_OUTOF10: 3

## 2020-01-12 ASSESSMENT — PAIN DESCRIPTION - PAIN TYPE
TYPE: ACUTE PAIN

## 2020-01-12 ASSESSMENT — PAIN DESCRIPTION - ORIENTATION
ORIENTATION: LEFT

## 2020-01-12 ASSESSMENT — PAIN - FUNCTIONAL ASSESSMENT
PAIN_FUNCTIONAL_ASSESSMENT: ACTIVITIES ARE NOT PREVENTED

## 2020-01-12 ASSESSMENT — PAIN DESCRIPTION - DESCRIPTORS
DESCRIPTORS: SHARP

## 2020-01-12 ASSESSMENT — PAIN DESCRIPTION - ONSET
ONSET: GRADUAL

## 2020-01-12 ASSESSMENT — PAIN DESCRIPTION - LOCATION
LOCATION: RIB CAGE

## 2020-01-12 ASSESSMENT — PAIN DESCRIPTION - FREQUENCY
FREQUENCY: INTERMITTENT

## 2020-01-12 NOTE — PROGRESS NOTES
Progress Note    Admit Date: 1/10/2020  Diet: Diet NPO, After Midnight Exceptions are: Ice Chips, Sips with Meds  DIET CLEAR LIQUID; Daily Fluid Restriction: 1200 ml    CC: Abnormal labs, anemia    Interval history: Had a mechanical fall overnight, trauma to head and anterior chest wall. Patient reports SOB improved since admission, stable at his baseline. He denies CP, palpitations, lightheadedness, reports lower extremity swelling at baseline. He denies abdominal pain, N/V, F/C. Medications:     Scheduled Meds:   [Held by provider] furosemide  20 mg Oral Daily    lidocaine  1 patch Transdermal Daily    amiodarone  200 mg Oral Daily    atorvastatin  80 mg Oral Daily    diltiazem  300 mg Oral Daily    escitalopram  10 mg Oral Daily    sodium chloride flush  10 mL Intravenous 2 times per day    pantoprazole  40 mg Intravenous BID     Continuous Infusions:  PRN Meds:sodium chloride flush, acetaminophen, ondansetron    Objective:   Vitals:   T-max:  Patient Vitals for the past 8 hrs:   BP Temp Temp src Pulse Resp SpO2   01/12/20 0840 (!) 158/74 96.9 °F (36.1 °C) Oral 61 16 94 %   01/12/20 0542 139/69 97.2 °F (36.2 °C) Oral 55 16 95 %       Intake/Output Summary (Last 24 hours) at 1/12/2020 1022  Last data filed at 1/11/2020 2347  Gross per 24 hour   Intake 298 ml   Output --   Net 298 ml       Review of Systems - as above    Physical Exam  Constitutional:       General: He is not in acute distress. Appearance: He is not ill-appearing. Cardiovascular:      Rate and Rhythm: Normal rate and regular rhythm. Heart sounds: No murmur. No friction rub. No gallop. Pulmonary:      Effort: Pulmonary effort is normal. No respiratory distress. Breath sounds: Normal breath sounds. No wheezing, rhonchi or rales. Abdominal:      General: Bowel sounds are normal. There is no distension. Palpations: Abdomen is soft. Tenderness: There is no tenderness. There is no guarding or rebound. Musculoskeletal:      Right lower leg: Edema present. Left lower leg: Edema present. Comments: Pitting edema b/l lower extremities   Neurological:      Cranial Nerves: No cranial nerve deficit. Sensory: No sensory deficit. Motor: No weakness. LABS:    CBC:   Recent Labs     01/10/20  1040  01/11/20  0751 01/11/20  1928 01/12/20  0810   WBC 5.9  --  7.8  --  8.3   HGB 7.4*   < > 10.1* 9.7* 9.9*   HCT 23.1*   < > 31.0* 29.4* 29.8*     --  296  --  265   MCV 79.3*  --  79.7*  --  79.7*    < > = values in this interval not displayed. Renal:    Recent Labs     01/10/20  1040 01/11/20  0751 01/12/20  0810   * 134* 129*   K 3.9 4.2 3.7   CL 95* 95* 93*   CO2 25 26 21   BUN 16 12 12   CREATININE 1.6* 1.2 1.2   GLUCOSE 99 90 106*   CALCIUM 8.4 9.0 8.6   ANIONGAP 13 13 15     Hepatic:   Recent Labs     01/10/20  1040   AST 21   ALT 20   BILITOT 0.3   BILIDIR <0.2   PROT 6.0*   LABALBU 3.4   ALKPHOS 67     Troponin:   Recent Labs     01/10/20  1040 01/10/20  1706 01/10/20  2120   TROPONINI 0.02* 0.01 0.02*     BNP: No results for input(s): BNP in the last 72 hours. Lipids: No results for input(s): CHOL, HDL in the last 72 hours. Invalid input(s): LDLCALCU, TRIGLYCERIDE  ABGs:  No results for input(s): PHART, ARO7YOP, PO2ART, JAV9MBM, BEART, THGBART, N6SWHEYC, COQ5ROD in the last 72 hours. INR:   Recent Labs     01/10/20  1040 01/11/20  1246 01/12/20  0810   INR 3.22* 1.73* 1.34*     Lactate:   Recent Labs     01/10/20  1042   LACTATE 1.01     Cultures:  -----------------------------------------------------------------  RAD:   XR CHEST PORTABLE   Final Result   Limited by hypoventilation. No definite consolidation. No other    significant interval change from the previous examination. CT ABDOMEN PELVIS W IV CONTRAST Additional Contrast? None   Final Result      Mild prominence of intrahepatic biliary ducts noted diffusely.  This is of doubtful significance but should be

## 2020-01-12 NOTE — PLAN OF CARE
Problem: Falls - Risk of:  Goal: Will remain free from falls  Description  Will remain free from falls  Outcome: Ongoing  Note:   Pt at risk for falls. Pt currently in bed. Bed locked in lowest position with bed alarm on. Personal belongings and call light within reach. Will continue to monitor. Problem: Pain:  Goal: Pain level will decrease  Description  Pain level will decrease  Outcome: Ongoing  Note:   Pt c/o left rib pain. PRN lidocaine patch applied. Will continue to monitor.

## 2020-01-13 ENCOUNTER — TELEPHONE (OUTPATIENT)
Dept: PHARMACY | Age: 85
End: 2020-01-13

## 2020-01-13 LAB
ANION GAP SERPL CALCULATED.3IONS-SCNC: 12 MMOL/L (ref 3–16)
BASOPHILS ABSOLUTE: 0 K/UL (ref 0–0.2)
BASOPHILS RELATIVE PERCENT: 0.3 %
BUN BLDV-MCNC: 12 MG/DL (ref 7–20)
CALCIUM SERPL-MCNC: 8.5 MG/DL (ref 8.3–10.6)
CHLORIDE BLD-SCNC: 95 MMOL/L (ref 99–110)
CO2: 28 MMOL/L (ref 21–32)
CREAT SERPL-MCNC: 1.3 MG/DL (ref 0.8–1.3)
EOSINOPHILS ABSOLUTE: 0 K/UL (ref 0–0.6)
EOSINOPHILS RELATIVE PERCENT: 0.3 %
GFR AFRICAN AMERICAN: >60
GFR NON-AFRICAN AMERICAN: 52
GLUCOSE BLD-MCNC: 108 MG/DL (ref 70–99)
HCT VFR BLD CALC: 29.5 % (ref 40.5–52.5)
HEMOGLOBIN: 9.8 G/DL (ref 13.5–17.5)
INR BLD: 1.37 (ref 0.86–1.14)
LYMPHOCYTES ABSOLUTE: 0.9 K/UL (ref 1–5.1)
LYMPHOCYTES RELATIVE PERCENT: 13.1 %
MAGNESIUM: 1.9 MG/DL (ref 1.8–2.4)
MCH RBC QN AUTO: 26.3 PG (ref 26–34)
MCHC RBC AUTO-ENTMCNC: 33.1 G/DL (ref 31–36)
MCV RBC AUTO: 79.3 FL (ref 80–100)
MONOCYTES ABSOLUTE: 0.9 K/UL (ref 0–1.3)
MONOCYTES RELATIVE PERCENT: 12.3 %
NEUTROPHILS ABSOLUTE: 5.2 K/UL (ref 1.7–7.7)
NEUTROPHILS RELATIVE PERCENT: 74 %
PDW BLD-RTO: 17.1 % (ref 12.4–15.4)
PLATELET # BLD: 249 K/UL (ref 135–450)
PMV BLD AUTO: 7.8 FL (ref 5–10.5)
POTASSIUM REFLEX MAGNESIUM: 3.3 MMOL/L (ref 3.5–5.1)
PROTHROMBIN TIME: 16 SEC (ref 10–13.2)
RBC # BLD: 3.72 M/UL (ref 4.2–5.9)
SODIUM BLD-SCNC: 135 MMOL/L (ref 136–145)
WBC # BLD: 7 K/UL (ref 4–11)

## 2020-01-13 PROCEDURE — 97116 GAIT TRAINING THERAPY: CPT

## 2020-01-13 PROCEDURE — 7100000010 HC PHASE II RECOVERY - FIRST 15 MIN: Performed by: INTERNAL MEDICINE

## 2020-01-13 PROCEDURE — 0DBK8ZX EXCISION OF ASCENDING COLON, VIA NATURAL OR ARTIFICIAL OPENING ENDOSCOPIC, DIAGNOSTIC: ICD-10-PCS | Performed by: INTERNAL MEDICINE

## 2020-01-13 PROCEDURE — 2500000003 HC RX 250 WO HCPCS: Performed by: INTERNAL MEDICINE

## 2020-01-13 PROCEDURE — 85610 PROTHROMBIN TIME: CPT

## 2020-01-13 PROCEDURE — 3609010200 HC COLONOSCOPY ABLATION TUMOR POLYP/OTHER LES: Performed by: INTERNAL MEDICINE

## 2020-01-13 PROCEDURE — 2580000003 HC RX 258: Performed by: STUDENT IN AN ORGANIZED HEALTH CARE EDUCATION/TRAINING PROGRAM

## 2020-01-13 PROCEDURE — 2709999900 HC NON-CHARGEABLE SUPPLY: Performed by: INTERNAL MEDICINE

## 2020-01-13 PROCEDURE — 0DBL8ZX EXCISION OF TRANSVERSE COLON, VIA NATURAL OR ARTIFICIAL OPENING ENDOSCOPIC, DIAGNOSTIC: ICD-10-PCS | Performed by: INTERNAL MEDICINE

## 2020-01-13 PROCEDURE — 99153 MOD SED SAME PHYS/QHP EA: CPT | Performed by: INTERNAL MEDICINE

## 2020-01-13 PROCEDURE — 1200000000 HC SEMI PRIVATE

## 2020-01-13 PROCEDURE — 0DB78ZX EXCISION OF STOMACH, PYLORUS, VIA NATURAL OR ARTIFICIAL OPENING ENDOSCOPIC, DIAGNOSTIC: ICD-10-PCS | Performed by: INTERNAL MEDICINE

## 2020-01-13 PROCEDURE — 97535 SELF CARE MNGMENT TRAINING: CPT

## 2020-01-13 PROCEDURE — 6360000002 HC RX W HCPCS: Performed by: INTERNAL MEDICINE

## 2020-01-13 PROCEDURE — 3609012400 HC EGD TRANSORAL BIOPSY SINGLE/MULTIPLE: Performed by: INTERNAL MEDICINE

## 2020-01-13 PROCEDURE — 97530 THERAPEUTIC ACTIVITIES: CPT

## 2020-01-13 PROCEDURE — 7100000011 HC PHASE II RECOVERY - ADDTL 15 MIN: Performed by: INTERNAL MEDICINE

## 2020-01-13 PROCEDURE — 99152 MOD SED SAME PHYS/QHP 5/>YRS: CPT | Performed by: INTERNAL MEDICINE

## 2020-01-13 PROCEDURE — 3609010300 HC COLONOSCOPY W/BIOPSY SINGLE/MULTIPLE: Performed by: INTERNAL MEDICINE

## 2020-01-13 PROCEDURE — 85025 COMPLETE CBC W/AUTO DIFF WBC: CPT

## 2020-01-13 PROCEDURE — 88305 TISSUE EXAM BY PATHOLOGIST: CPT

## 2020-01-13 PROCEDURE — 0DBN8ZX EXCISION OF SIGMOID COLON, VIA NATURAL OR ARTIFICIAL OPENING ENDOSCOPIC, DIAGNOSTIC: ICD-10-PCS | Performed by: INTERNAL MEDICINE

## 2020-01-13 PROCEDURE — 80048 BASIC METABOLIC PNL TOTAL CA: CPT

## 2020-01-13 PROCEDURE — C9113 INJ PANTOPRAZOLE SODIUM, VIA: HCPCS

## 2020-01-13 PROCEDURE — 6370000000 HC RX 637 (ALT 250 FOR IP): Performed by: STUDENT IN AN ORGANIZED HEALTH CARE EDUCATION/TRAINING PROGRAM

## 2020-01-13 PROCEDURE — 0DBP8ZX EXCISION OF RECTUM, VIA NATURAL OR ARTIFICIAL OPENING ENDOSCOPIC, DIAGNOSTIC: ICD-10-PCS | Performed by: INTERNAL MEDICINE

## 2020-01-13 PROCEDURE — 36415 COLL VENOUS BLD VENIPUNCTURE: CPT

## 2020-01-13 PROCEDURE — 97161 PT EVAL LOW COMPLEX 20 MIN: CPT

## 2020-01-13 PROCEDURE — 83735 ASSAY OF MAGNESIUM: CPT

## 2020-01-13 PROCEDURE — 6360000002 HC RX W HCPCS

## 2020-01-13 PROCEDURE — 97165 OT EVAL LOW COMPLEX 30 MIN: CPT

## 2020-01-13 RX ORDER — WARFARIN SODIUM 2.5 MG/1
2.5 TABLET ORAL DAILY
Qty: 30 TABLET | Refills: 3 | Status: SHIPPED | OUTPATIENT
Start: 2020-01-21

## 2020-01-13 RX ORDER — POTASSIUM CHLORIDE 20 MEQ/1
40 TABLET, EXTENDED RELEASE ORAL ONCE
Status: COMPLETED | OUTPATIENT
Start: 2020-01-13 | End: 2020-01-13

## 2020-01-13 RX ORDER — MEPERIDINE HYDROCHLORIDE 50 MG/ML
INJECTION INTRAMUSCULAR; INTRAVENOUS; SUBCUTANEOUS PRN
Status: DISCONTINUED | OUTPATIENT
Start: 2020-01-13 | End: 2020-01-13 | Stop reason: HOSPADM

## 2020-01-13 RX ORDER — WARFARIN SODIUM 5 MG/1
TABLET ORAL
Qty: 90 TABLET | Refills: 2 | Status: SHIPPED | OUTPATIENT
Start: 2020-01-21

## 2020-01-13 RX ORDER — MIDAZOLAM HYDROCHLORIDE 1 MG/ML
INJECTION INTRAMUSCULAR; INTRAVENOUS PRN
Status: DISCONTINUED | OUTPATIENT
Start: 2020-01-13 | End: 2020-01-13 | Stop reason: HOSPADM

## 2020-01-13 RX ORDER — LIDOCAINE 4 G/G
1 PATCH TOPICAL DAILY
Qty: 1 BOX | Refills: 1 | Status: SHIPPED | OUTPATIENT
Start: 2020-01-14

## 2020-01-13 RX ADMIN — PANTOPRAZOLE SODIUM 40 MG: 40 INJECTION, POWDER, FOR SOLUTION INTRAVENOUS at 20:44

## 2020-01-13 RX ADMIN — POTASSIUM CHLORIDE 40 MEQ: 20 TABLET, EXTENDED RELEASE ORAL at 09:19

## 2020-01-13 RX ADMIN — ESCITALOPRAM OXALATE 10 MG: 10 TABLET ORAL at 09:19

## 2020-01-13 RX ADMIN — AMIODARONE HYDROCHLORIDE 200 MG: 200 TABLET ORAL at 09:20

## 2020-01-13 RX ADMIN — Medication 10 ML: at 20:44

## 2020-01-13 RX ADMIN — ATORVASTATIN CALCIUM 80 MG: 40 TABLET, FILM COATED ORAL at 09:20

## 2020-01-13 RX ADMIN — ACETAMINOPHEN 650 MG: 325 TABLET ORAL at 21:06

## 2020-01-13 RX ADMIN — Medication 10 ML: at 09:20

## 2020-01-13 RX ADMIN — DILTIAZEM HYDROCHLORIDE 300 MG: 300 CAPSULE, COATED, EXTENDED RELEASE ORAL at 09:25

## 2020-01-13 RX ADMIN — PANTOPRAZOLE SODIUM 40 MG: 40 INJECTION, POWDER, FOR SOLUTION INTRAVENOUS at 09:19

## 2020-01-13 ASSESSMENT — PAIN DESCRIPTION - DESCRIPTORS
DESCRIPTORS: ACHING
DESCRIPTORS: ACHING

## 2020-01-13 ASSESSMENT — PAIN SCALES - GENERAL
PAINLEVEL_OUTOF10: 4
PAINLEVEL_OUTOF10: 0
PAINLEVEL_OUTOF10: 3
PAINLEVEL_OUTOF10: 3

## 2020-01-13 ASSESSMENT — PAIN DESCRIPTION - ORIENTATION
ORIENTATION: LEFT
ORIENTATION: LEFT

## 2020-01-13 ASSESSMENT — PAIN - FUNCTIONAL ASSESSMENT
PAIN_FUNCTIONAL_ASSESSMENT: ACTIVITIES ARE NOT PREVENTED
PAIN_FUNCTIONAL_ASSESSMENT: FACES
PAIN_FUNCTIONAL_ASSESSMENT: ACTIVITIES ARE NOT PREVENTED
PAIN_FUNCTIONAL_ASSESSMENT: FACES
PAIN_FUNCTIONAL_ASSESSMENT: FACES

## 2020-01-13 ASSESSMENT — PAIN DESCRIPTION - ONSET
ONSET: GRADUAL
ONSET: PROGRESSIVE

## 2020-01-13 ASSESSMENT — PAIN DESCRIPTION - LOCATION
LOCATION: RIB CAGE
LOCATION: RIB CAGE

## 2020-01-13 ASSESSMENT — PAIN DESCRIPTION - PAIN TYPE
TYPE: ACUTE PAIN
TYPE: ACUTE PAIN

## 2020-01-13 ASSESSMENT — PAIN DESCRIPTION - PROGRESSION
CLINICAL_PROGRESSION: NOT CHANGED
CLINICAL_PROGRESSION: NOT CHANGED

## 2020-01-13 ASSESSMENT — PAIN DESCRIPTION - FREQUENCY
FREQUENCY: CONTINUOUS
FREQUENCY: CONTINUOUS

## 2020-01-13 NOTE — PROGRESS NOTES
type. Diagnoses of Atrial fibrillation, unspecified type ([facility] and Iron deficiency anemia, unspecified iron deficiency anemia type were also pertinent to this visit. has a past medical history of (HFpEF) heart failure with preserved ejection fraction ([facility] CAD (coronary artery disease), Central retinal artery occlusion, Clostridium difficile diarrhea, Hypertension, PAF (paroxysmal atrial fibrillation) ([facility] and PMR (polymyalgia rheumatica) ([facility] has a past surgical history that includes Upper gastrointestinal endoscopy (N/A, 1/13/2020); Colonoscopy (1/13/2020); and Colonoscopy (1/13/2020). Restrictions  Position Activity Restriction  Other position/activity restrictions: up with assist     Vision/Hearing  Vision: Impaired(R eye blind, L eye blurred)  Hearing: Exceptions to [facility]  Hearing Exceptions: Hard of hearing/hearing concerns       Subjective  General  Chart Reviewed: Yes  Additional Pertinent Hx: Pt a 79 yo male admitted 1/10/20 for anemia. PMHx: PAF, HTN, CAD, HFpEF, former smoker. CXR: unremarkable. CT abdomen: unremarkable. EGD: normal. Colonoscopy: polyps, minor diverticulosis. Family / Caregiver Present: Yes(daughter present for half of session)  Diagnosis: Anemia  Follows Commands: Within Functional Limits  Subjective  Subjective: Pt found sleeping in supine. Pt agreeable to PT.  \"My side hurts\"  Pain Screening  Patient Currently in Pain: Yes(3/10; LLQ; RN aware)         Orientation  Orientation  Overall Orientation Status: Within Functional Limits     Social/Functional History  Social/Functional History  Lives With: Spouse  Type of Home: House  Home Layout: Bed/Bath upstairs, Two level(goes upstairs 2-3x/week to shower)  Home Access: Stairs to enter without rails  Entrance Stairs - Number of Steps: 1 step into main entrance; 2 steps from garage entrance  Bathroom Shower/Tub: Walk-in shower  Bathroom Toilet: Standard  Bathroom Equipment: Grab bars in [facility] [place]: [place], [facility]

## 2020-01-13 NOTE — PROGRESS NOTES
Patient continues to require oxygen. . Attempts made to wean patient were unsuccessful. Per resident Talib Schwab attempted to use incentive spirometer for patient without any success. No home O2 orders had been placed and patient not able to discharge home because of oxygen requirements. Messaged on call resident Dr. Hannah Girard and explained situation he told me to keep the discharge order in. I spoke to Chilo Woodruff and let her know.

## 2020-01-13 NOTE — H&P
History and Physical / Pre-Sedation Assessment    Patient:  Thalia Bay   :   1931     Intended Procedure:  egd and colon    HPI: anemia    Past Medical History:   has a past medical history of (HFpEF) heart failure with preserved ejection fraction ([address] CAD (coronary artery disease), Central retinal artery occlusion, Clostridium difficile diarrhea, Hypertension, PAF (paroxysmal atrial fibrillation) ([address] and PMR (polymyalgia rheumatica) ([address] Past Surgical History:   has no past surgical history on file. Medications:  Prior to Admission medications    Medication Sig Start Date End Date Taking?  Authorizing Provider   furosemide (LASIX) 40 MG tablet Take 0.5 tablets by mouth daily Indications: taking half daily 19  Yes Ashlee Wilson MD   escitalopram (LEXAPRO) 10 MG tablet Take 1 tablet by mouth daily 19  Yes Ashlee Wilson MD   atorvastatin (LIPITOR) 80 MG tablet TAKE ONE TABLET BY MOUTH DAILY 19  Yes Ashlee Wilson MD   diltiazem (CARTIA XT) 300 MG extended release capsule TAKE ONE CAPSULE BY MOUTH DAILY 19  Yes Ashlee Wilson MD   amiodarone (CORDARONE) 200 MG tablet TAKE ONE TABLET BY MOUTH DAILY 10/28/19  Yes BARBY Hawk CNP   spironolactone (ALDACTONE) 25 MG tablet TAKE ONE-HALF TABLET BY MOUTH DAILY 19  Yes Denver Evener, APRN - CNP   warfarin (COUMADIN) 2.5 MG tablet Take 1 tablet by mouth daily Or as directed by clinic (do not discontinue warfarin 5 mg order) 10/17/19   Ashlee Wilson MD   warfarin (COUMADIN) 5 MG tablet TAKE ONE TABLET BY MOUTH DAILY 19   Ashlee Wilson MD   zoster recombinant adjuvanted vaccine [facility]) 50 MCG/0.5ML SUSR injection Inject 0.5 mLs into the muscle See Admin Instructions 1 dose now and repeat in 2-6 months 4/15/19   Héctor Mendes MD   Handicap Placard MISC by Does not apply route Patient cannot ambulate more than 200 ft  Expiration date 19   Denver Evener, APRN - CNP       Family History:  family history is not on file. Social History:   reports that he quit smoking about 33 years ago. He has a 34.00 pack-year smoking history. He has never used smokeless tobacco. He reports that he does not drink alcohol or use drugs. Allergies:  Patient has no known allergies. ROS:  twelve point system review was unremarkable except for above noted history. Nurses notes reviewed and agreed. Medications reviewed    Physical Exam:  Vital Signs: /79   Pulse 56   Temp 98 °F (36.7 °C) (Oral)   Resp 17   Ht 5' 11\" (1.803 m)   Wt 187 lb (84.8 kg)   SpO2 96%   BMI 26.08 kg/m²    Skin: normal  HEENT: normal  Neck: supple. No adenopathy. No thyromegaly. No JVD. Pulmonary:Normal  Cardiac:Normal  Abdomen:Normal  MS: normal  Neuro: normal  Ext: no edema. Pulses normal    Pre-Procedure Assessment / Plan:  ASA 2 - Patient with mild systemic disease with no functional limitations  Mallampati Airway Assessment:  Mallampati Class I - (soft palate, fauces, uvula & anterior/posterior tonsillar pillars are visible)  Level of Sedation Plan: Moderate sedation  Post Procedure plan: Return to same level of care    I assessed the patient and find that the patient is in satisfactory condition to proceed with the planned procedure and sedation plan. I have explained the risk, benefits, and alternatives to the procedure. The patient understands and agrees to proceed.   Yani Cameron  8:32 AM 1/13/2020

## 2020-01-13 NOTE — PROGRESS NOTES
Tolerance: Patient limited by fatigue  Activity Tolerance: initially on 2L O2 - removed per discussion w/ nurse; on RA, O2 sats remained 90-91% despite SOB on exertion  Safety Devices  Safety Devices in place: Yes  Type of devices: Nurse notified; Left in chair;Chair alarm in place;Call light within reach(daughter in room; setup w/ lunch tray)           Patient Diagnosis(es): The primary encounter diagnosis was Anemia, unspecified type. A diagnosis of Atrial fibrillation, unspecified type [facility]) was also pertinent to this visit. has a past medical history of (HFpEF) heart failure with preserved ejection fraction ([facility] CAD (coronary artery disease), Central retinal artery occlusion, Clostridium difficile diarrhea, Hypertension, PAF (paroxysmal atrial fibrillation) ([facility] and PMR (polymyalgia rheumatica) ([facility] has a past surgical history that includes Upper gastrointestinal endoscopy (N/A, 1/13/2020); Colonoscopy (1/13/2020); and Colonoscopy (1/13/2020). Treatment Diagnosis: impaired ADLs/transfers and decreased functional activity tolerance      Restrictions  Position Activity Restriction  Other position/activity restrictions: up with assist    Subjective   General  Chart Reviewed: Yes  Additional Pertinent Hx: 80 y.o. male who presents because of anemia. Hospital Course:  CXR: neg, Age-indeterminate compression fracture at T6 new in comparison to the previous chest x-ray; CT Abd/Pelvis: neg; CT Head: neg; Colonoscopy: several polyps. PMH: Polymyalgia Rheumatica, HTN, CAD, Heart Failure. Family / Caregiver Present: Yes(daughter)  Referring Practitioner: Dr. Elicia Taylor  Diagnosis: Anemia  Subjective  Subjective: Supine in bed on entry. Agreeable to work with therapy. Agreeable to use wh walker initially at home \"I have one. \"  Patient Currently in Pain: Yes(L lower quadrant pain - not rated, nurse aware; \"they say it's muscular\")  Vital Signs  Patient Currently in Pain: Yes(L lower quadrant pain - not

## 2020-01-13 NOTE — DISCHARGE INSTR - COC
Continuity of Care Form    Patient Name: Rosa Medina   :  1931  MRN:  [id number]    Admit date:  1/10/2020  Discharge date:  ***    Code Status Order: Full Code   Advance Directives:   Advance Care Flowsheet Documentation     Date/Time Healthcare Directive Type of Healthcare Directive Copy in [address] Agent's Name Healthcare Agent's Phone Number    20 0710  Yes, patient has an advance directive for healthcare treatment --  No, copy requested from family -- -- --    01/10/20 4330  No, patient does not have an advance directive for healthcare treatment --  -- -- -- --          Admitting Physician:  Joshua Landaverde MD  PCP: Fiona Reveles MD    Discharging Nurse: [facility] Unit/Room#: 4831/6074-21  Discharging Unit Phone Number: ***    Emergency Contact:   Extended Emergency Contact Information  Primary Emergency Contact: [address] Phone: [phone]  Mobile Phone: [phone]  Relation: Child  Secondary Emergency Contact: [address] Phone: [phone]  Mobile Phone: [phone]  Relation: Child    Past Surgical History:  History reviewed. No pertinent surgical history. Immunization History:   Immunization History   Administered Date(s) Administered    Influenza Vaccine, unspecified formulation 10/26/2018    Influenza, Triv, inactivated, subunit, adjuvanted, IM (Fluad 65 yrs and older) 10/31/2019    Pneumococcal Polysaccharide ([id number]) 02/15/2008    Zoster Live (Zostavax) 1981       Active Problems:  Patient Active Problem List   Diagnosis Code    Atrial fibrillation (HCC) I48.91    Atrial flutter (HCC) I48.92    Essential hypertension, benign I10    Chronic diastolic heart failure (HCC) I50.32    Cellulitis of left foot L03. 116    Elevated serum creatinine R79.89    Decreased GFR R94.4    Nausea & vomiting R11.2    Stage 3 chronic kidney disease

## 2020-01-13 NOTE — OP NOTE
EGD: normal  Colonoscopy: several polyps.  Very minor diverticulosis    Will not start coumadin yet  ruby montes m.d.  1-13-20

## 2020-01-13 NOTE — PROGRESS NOTES
Physical Therapy/Occupational Therapy    Hold note    Referral received. Pt currently off floor to endo. Will f/u later pm as time allows or 1/14/20.       Cherri Celeste, PT  Anya Suarez OTR/L #[id number]

## 2020-01-14 ENCOUNTER — TELEPHONE (OUTPATIENT)
Dept: PRIMARY CARE CLINIC | Age: 85
End: 2020-01-14

## 2020-01-14 ENCOUNTER — APPOINTMENT (OUTPATIENT)
Dept: GENERAL RADIOLOGY | Age: 85
DRG: 378 | End: 2020-01-14
Payer: MEDICARE

## 2020-01-14 VITALS
HEART RATE: 60 BPM | TEMPERATURE: 96.7 F | DIASTOLIC BLOOD PRESSURE: 67 MMHG | WEIGHT: 187 LBS | SYSTOLIC BLOOD PRESSURE: 134 MMHG | RESPIRATION RATE: 20 BRPM | OXYGEN SATURATION: 91 % | HEIGHT: 71 IN | BODY MASS INDEX: 26.18 KG/M2

## 2020-01-14 LAB
ANION GAP SERPL CALCULATED.3IONS-SCNC: 14 MMOL/L (ref 3–16)
BASOPHILS ABSOLUTE: 0 K/UL (ref 0–0.2)
BASOPHILS RELATIVE PERCENT: 0.7 %
BUN BLDV-MCNC: 13 MG/DL (ref 7–20)
CALCIUM SERPL-MCNC: 8.6 MG/DL (ref 8.3–10.6)
CHLORIDE BLD-SCNC: 96 MMOL/L (ref 99–110)
CO2: 25 MMOL/L (ref 21–32)
CREAT SERPL-MCNC: 1.4 MG/DL (ref 0.8–1.3)
EOSINOPHILS ABSOLUTE: 0 K/UL (ref 0–0.6)
EOSINOPHILS RELATIVE PERCENT: 0.7 %
GFR AFRICAN AMERICAN: 58
GFR NON-AFRICAN AMERICAN: 48
GLUCOSE BLD-MCNC: 89 MG/DL (ref 70–99)
HCT VFR BLD CALC: 28.9 % (ref 40.5–52.5)
HEMOGLOBIN: 9.6 G/DL (ref 13.5–17.5)
INR BLD: 1.28 (ref 0.86–1.14)
LYMPHOCYTES ABSOLUTE: 1 K/UL (ref 1–5.1)
LYMPHOCYTES RELATIVE PERCENT: 13.3 %
MAGNESIUM: 1.9 MG/DL (ref 1.8–2.4)
MCH RBC QN AUTO: 26.3 PG (ref 26–34)
MCHC RBC AUTO-ENTMCNC: 33.2 G/DL (ref 31–36)
MCV RBC AUTO: 79.2 FL (ref 80–100)
MONOCYTES ABSOLUTE: 0.8 K/UL (ref 0–1.3)
MONOCYTES RELATIVE PERCENT: 10.5 %
NEUTROPHILS ABSOLUTE: 5.6 K/UL (ref 1.7–7.7)
NEUTROPHILS RELATIVE PERCENT: 74.8 %
PDW BLD-RTO: 17.4 % (ref 12.4–15.4)
PLATELET # BLD: 223 K/UL (ref 135–450)
PMV BLD AUTO: 8 FL (ref 5–10.5)
POTASSIUM REFLEX MAGNESIUM: 3.1 MMOL/L (ref 3.5–5.1)
POTASSIUM SERPL-SCNC: 3.5 MMOL/L (ref 3.5–5.1)
PROTHROMBIN TIME: 14.9 SEC (ref 10–13.2)
RBC # BLD: 3.65 M/UL (ref 4.2–5.9)
SODIUM BLD-SCNC: 135 MMOL/L (ref 136–145)
WBC # BLD: 7.5 K/UL (ref 4–11)

## 2020-01-14 PROCEDURE — 6370000000 HC RX 637 (ALT 250 FOR IP): Performed by: INTERNAL MEDICINE

## 2020-01-14 PROCEDURE — 80048 BASIC METABOLIC PNL TOTAL CA: CPT

## 2020-01-14 PROCEDURE — 6370000000 HC RX 637 (ALT 250 FOR IP): Performed by: STUDENT IN AN ORGANIZED HEALTH CARE EDUCATION/TRAINING PROGRAM

## 2020-01-14 PROCEDURE — C9113 INJ PANTOPRAZOLE SODIUM, VIA: HCPCS

## 2020-01-14 PROCEDURE — 83735 ASSAY OF MAGNESIUM: CPT

## 2020-01-14 PROCEDURE — 2580000003 HC RX 258: Performed by: STUDENT IN AN ORGANIZED HEALTH CARE EDUCATION/TRAINING PROGRAM

## 2020-01-14 PROCEDURE — 84132 ASSAY OF SERUM POTASSIUM: CPT

## 2020-01-14 PROCEDURE — 6360000002 HC RX W HCPCS

## 2020-01-14 PROCEDURE — 85610 PROTHROMBIN TIME: CPT

## 2020-01-14 PROCEDURE — 71046 X-RAY EXAM CHEST 2 VIEWS: CPT

## 2020-01-14 PROCEDURE — 36415 COLL VENOUS BLD VENIPUNCTURE: CPT

## 2020-01-14 PROCEDURE — 85025 COMPLETE CBC W/AUTO DIFF WBC: CPT

## 2020-01-14 RX ORDER — POTASSIUM CHLORIDE 20 MEQ/1
40 TABLET, EXTENDED RELEASE ORAL EVERY 4 HOURS
Status: DISCONTINUED | OUTPATIENT
Start: 2020-01-14 | End: 2020-01-14

## 2020-01-14 RX ORDER — ACETAMINOPHEN 325 MG/1
650 TABLET ORAL EVERY 6 HOURS SCHEDULED
Status: DISCONTINUED | OUTPATIENT
Start: 2020-01-14 | End: 2020-01-14 | Stop reason: HOSPADM

## 2020-01-14 RX ORDER — FUROSEMIDE 20 MG/1
20 TABLET ORAL ONCE
Status: COMPLETED | OUTPATIENT
Start: 2020-01-14 | End: 2020-01-14

## 2020-01-14 RX ORDER — POTASSIUM CHLORIDE 20 MEQ/1
40 TABLET, EXTENDED RELEASE ORAL ONCE
Status: COMPLETED | OUTPATIENT
Start: 2020-01-14 | End: 2020-01-14

## 2020-01-14 RX ORDER — POTASSIUM CHLORIDE 20 MEQ/1
40 TABLET, EXTENDED RELEASE ORAL
Status: DISPENSED | OUTPATIENT
Start: 2020-01-14 | End: 2020-01-14

## 2020-01-14 RX ADMIN — DILTIAZEM HYDROCHLORIDE 300 MG: 300 CAPSULE, COATED, EXTENDED RELEASE ORAL at 10:40

## 2020-01-14 RX ADMIN — ACETAMINOPHEN 650 MG: 325 TABLET ORAL at 17:32

## 2020-01-14 RX ADMIN — ESCITALOPRAM OXALATE 10 MG: 10 TABLET ORAL at 10:29

## 2020-01-14 RX ADMIN — AMIODARONE HYDROCHLORIDE 200 MG: 200 TABLET ORAL at 10:29

## 2020-01-14 RX ADMIN — FUROSEMIDE 20 MG: 20 TABLET ORAL at 18:45

## 2020-01-14 RX ADMIN — Medication 10 ML: at 10:31

## 2020-01-14 RX ADMIN — ACETAMINOPHEN 650 MG: 325 TABLET ORAL at 03:28

## 2020-01-14 RX ADMIN — ATORVASTATIN CALCIUM 80 MG: 40 TABLET, FILM COATED ORAL at 10:29

## 2020-01-14 RX ADMIN — ACETAMINOPHEN 650 MG: 325 TABLET ORAL at 10:40

## 2020-01-14 RX ADMIN — POTASSIUM CHLORIDE 40 MEQ: 20 TABLET, EXTENDED RELEASE ORAL at 17:33

## 2020-01-14 RX ADMIN — PANTOPRAZOLE SODIUM 40 MG: 40 INJECTION, POWDER, FOR SOLUTION INTRAVENOUS at 10:30

## 2020-01-14 RX ADMIN — POTASSIUM CHLORIDE 40 MEQ: 20 TABLET, EXTENDED RELEASE ORAL at 14:01

## 2020-01-14 RX ADMIN — POTASSIUM CHLORIDE 40 MEQ: 20 TABLET, EXTENDED RELEASE ORAL at 10:29

## 2020-01-14 ASSESSMENT — PAIN SCALES - GENERAL
PAINLEVEL_OUTOF10: 4
PAINLEVEL_OUTOF10: 3
PAINLEVEL_OUTOF10: 3

## 2020-01-14 ASSESSMENT — PAIN - FUNCTIONAL ASSESSMENT
PAIN_FUNCTIONAL_ASSESSMENT: ACTIVITIES ARE NOT PREVENTED
PAIN_FUNCTIONAL_ASSESSMENT: ACTIVITIES ARE NOT PREVENTED

## 2020-01-14 ASSESSMENT — PAIN DESCRIPTION - FREQUENCY
FREQUENCY: CONTINUOUS
FREQUENCY: CONTINUOUS

## 2020-01-14 ASSESSMENT — PAIN DESCRIPTION - DESCRIPTORS
DESCRIPTORS: SHARP
DESCRIPTORS: ACHING;SHARP

## 2020-01-14 ASSESSMENT — PAIN DESCRIPTION - ORIENTATION
ORIENTATION: LEFT
ORIENTATION: LEFT

## 2020-01-14 ASSESSMENT — PAIN DESCRIPTION - ONSET
ONSET: ON-GOING
ONSET: GRADUAL

## 2020-01-14 ASSESSMENT — PAIN DESCRIPTION - PAIN TYPE
TYPE: ACUTE PAIN
TYPE: ACUTE PAIN

## 2020-01-14 ASSESSMENT — PAIN DESCRIPTION - PROGRESSION
CLINICAL_PROGRESSION: NOT CHANGED
CLINICAL_PROGRESSION: NOT CHANGED

## 2020-01-14 ASSESSMENT — PAIN DESCRIPTION - LOCATION
LOCATION: RIB CAGE
LOCATION: RIB CAGE

## 2020-01-14 ASSESSMENT — PAIN SCALES - WONG BAKER: WONGBAKER_NUMERICALRESPONSE: 0

## 2020-01-14 NOTE — TELEPHONE ENCOUNTER
The patient's daughter Rubin Bryson called to report the patient was admitted to [facility].. The patient had an endoscopy and a colonoscopy and since been placed on oxygen. Rubin Bryson reported the patient was advised that he does not qualify for rehab services. Rubin Bryson is seeking a recommendation from Dr. Yvonne Judd.     Rubin Bryson requested a call back at:  [phone]

## 2020-01-14 NOTE — PROGRESS NOTES
GI Progress Note    Elton Solo is a 80 y.o. male patient. 1. Anemia, unspecified type    2. Atrial fibrillation, unspecified type ([address]    3. Iron deficiency anemia, unspecified iron deficiency anemia type        Admit Date: 1/10/2020    Subjective:       Feels well  Discussed egd and colonoscopy findings   No abdominal pain      ROS:  Cardiovascular ROS: no chest pain or dyspnea on exertion  Gastrointestinal ROS: no abdominal pain, change in bowel habits, or black or bloody stools  Respiratory ROS: no cough, shortness of breath, or wheezing    Scheduled Meds:   acetaminophen  650 mg Oral 4 times per day    potassium chloride  40 mEq Oral Q2H    [Held by provider] furosemide  20 mg Oral Daily    lidocaine  1 patch Transdermal Daily    amiodarone  200 mg Oral Daily    atorvastatin  80 mg Oral Daily    diltiazem  300 mg Oral Daily    escitalopram  10 mg Oral Daily    sodium chloride flush  10 mL Intravenous 2 times per day    pantoprazole  40 mg Intravenous BID       Continuous Infusions:      PRN Meds:  sodium chloride flush, ondansetron      Objective:       Patient Vitals for the past 24 hrs:   BP Temp Temp src Pulse Resp SpO2   20 0315 (!) 145/69 97.3 °F (36.3 °C) Oral 64 22 93 %   20 2347 127/65 97.1 °F (36.2 °C) Oral 80 20 93 %   20 2044 (!) 144/78 97.3 °F (36.3 °C) Oral 59 16 92 %   20 1622 (!) 159/83 97.1 °F (36.2 °C) Oral 60 16 (!) 88 %       Exam:    VITALS:  BP (!) 145/69   Pulse 64   Temp 97.3 °F (36.3 °C) (Oral)   Resp 22   Ht 5' 11\" (1.803 m)   Wt 187 lb (84.8 kg)   SpO2 93%   BMI 26.08 kg/m²   TEMPERATURE:  Current - Temp: 97.3 °F (36.3 °C);  Max - Temp  Av.2 °F (36.2 °C)  Min: 97.1 °F (36.2 °C)  Max: 97.3 °F (36.3 °C)    NAD  General appearance: alert, appears stated age, cooperative and no distress  Head: Normocephalic, without obvious abnormality, atraumatic  Neck: supple, symmetrical, trachea midline and thyroid not enlarged, symmetric, no tenderness/mass/nodules  CVS:  RRR, Nl s1s2  Lungs CTA Bilaterally, normal effort  Abdomen: soft, non-tender; bowel sounds normal; no masses,  no organomegaly  AAOx3, No asterixis or encephalopathy  Extremities: No edema. Lab Data:  Recent Labs     01/12/20  0810 01/13/20 0617 01/14/20 0627   WBC 8.3 7.0 7.5   HGB 9.9* 9.8* 9.6*   HCT 29.8* 29.5* 28.9*   MCV 79.7* 79.3* 79.2*    249 223     Recent Labs     01/12/20  0810 01/12/20  1616 01/13/20 0617 01/14/20 0627   * 130* 135* 135*   K 3.7  --  3.3* 3.1*   CL 93*  --  95* 96*   CO2 21  --  28 25   BUN 12  --  12 13   CREATININE 1.2  --  1.3 1.4*     No results for input(s): AST, ALT, ALB, BILIDIR, BILITOT, ALKPHOS in the last 72 hours. No results for input(s): LIPASE, AMYLASE in the last 72 hours. Recent Labs     01/12/20  0810 01/13/20 0617 01/14/20  0627   INR 1.34* 1.37* 1.28*     No results for input(s): PTT in the last 72 hours. No results for input(s): OCCULTBLD in the last 72 hours. Assessment:       Active Problems:    Anemia  Resolved Problems:    * No resolved hospital problems.  *  colon polyps    Recommendations:       Ok to restart coumadin in 4 days  Discussed with him and his daughter    Keila Mijares MD  1/14/2020  1:42 PM

## 2020-01-14 NOTE — PLAN OF CARE
Problem: Falls - Risk of:  Goal: Will remain free from falls  Description  Will remain free from falls  1/14/2020 1554 by Eli Jimenez RN  Note:   Patient at risk for falls. Patient resting quietly in bed. Side rails up x 2. Bed locked in lowest position. Bed alarm on. Bedside table and call light within reach. Patient instructed to call for assistance. Patient verbalized understanding. Will continue to monitor. Avasys in room      1     Problem: Pain:  Goal: Pain level will decrease  Description  Pain level will decrease  1/14/2020 1554 by Eli Jimenez RN  Note:   Patient complains of pain at level 3/10. Patient describes pain as sharp tp rib cage  Patient requests pain medication. Patient medicated with tylenol per PRN orders . Will continue to monitor.

## 2020-01-14 NOTE — CARE COORDINATION
Attempted to see patient but he was leaving for a CT scan. Per notes pt is from home with his daughter and should be able to return there at d/c.      Electronically signed by Gail Bae RN on 1/12/2020 at 12:59 PM  [phone]
Case Management            Discharge Note                    Date / Time of Note: 1/14/2020 3:16 PM                  Discharge Note Completed by: Jeana Cameron    Patient Name: Elier Ny   YOB: 1931  Diagnosis: Anemia [D64.9]  Anemia [D64.9]   Date / Time: 1/10/2020  9:44 AM    Current PCP: Giana Ramachandran MD  Clinic patient: No    Hospitalization in the last 30 days: No    Advance Directives:  Code Status: Full Code  [place] DNR form completed and on chart: Not Indicated    Financial:  Payor: Angelina Thomas / Plan: Yazmin Simms PPO / Product Type: Medicare /      Pharmacy:    [facility] [address], [address] [place], [place] [phone] -  [phone]  [address]  Phone: [phone] Fax: [phone]      Assistance purchasing medications?: Potential Assistance Purchasing Medications: No  Assistance provided by Case Management: None at this time    Does patient want to participate in local refill/ meds to beds program?: Yes    Meds To Beds General Rules:  1. Can ONLY be done Monday- Friday between 8:30am-5pm  2. Prescription(s) must be in pharmacy by 3pm to be filled same day  3. Copy of patient's insurance/ prescription drug card and patient face sheet must be sent along with the prescription(s)  4. Cost of Rx cannot be added to hospital bill. If financial assistance is needed, please contact unit  or ;  or  CANNOT provide pharmacy voucher for patients co-pays  5.  Patients can then  the prescription on their way out of the hospital at discharge, or pharmacy can deliver to the bedside if staff is available. (payment due at time of pick-up or delivery - cash, check, or card accepted)     Able to afford home medications/ co-pay costs: Yes    ADLS:  Current PT AM-PAC
[facility] received referral from SW/JAMEL for Home Oxygen. For Insurance coverage, we will need these items prior to delivery of oxygen:    1.) Qualifying Dx (Medicare standard doesn't cover acute or exacerbated state or PNA/CAP. Acute or exacerbated condition can be documented as a chronic or stable state at discharge in order to be covered by ins)    2.) Qualilfying O2 SATs; the day of- or one day prior to discharge. [employer] = __% (If 88% or below, then no further ambulation testing is required. )       RA w/Exertion = __%       (x)LPM w/Exertion = __%  O2 SAT testing with ambulation on RA and with O2 must be completed in the same session and documented in the same progress note with the theeventwall results. Anything above 4 LPM will require documentation to show O2 SATs were 88% or below on 4 LPM at rest, per insurance requirements. 3.) Detailed Written MD Orders with Liter Flow and Modality and Continuous -vs- With Exertion -vs- at HS and please indicate \"With Bleed-in to CPAP\" if this applies . If patient is mobile then please add  \"With Portability\". Will need MD sign/printed name/NPI#/date on the orders. 4.) Chart Notes documenting medical necessity for the equipment being ordered. 5. ) Meds List to show medications other than oxygen that were tried and failed     If after 5:00 P. M. please call [address] [place] [address] at [phone] for the On Call Service.             Thank You For The Referral,        Yaz Carver  Transitional   [phone]  [address] [place] [address]  [phone]
Interventions  No Identified Needs                               Summary  CTN spoke with patient this afternoon for initial face to face interview. Patient states he currently lives in bilevel home with spouse, not daughter. States he uses a walker with ambulation, spouse and daughters manages his medications, homemaking and assistance with all other ADL's and IADL's. Patient reports that his 2 adult daughters live about 5 minutes away and help out a lot with homemaking, shopping, finances and any other support needed. Patient denies having any outside services in home prior to admission, [facility] at [facility], if recommended. CTN again explained CTN program to patient, has been followed in the past by CTN, patient is agreeable at this time. CTN spoke with floor  regarding the above, awaiting PT/OT evaluation to see if placement is needed, [facility] with [facility] is following as well, in case [facility] is needed. CTN also explained and left patient with a copy of CHF zoning tool for his reference.     Follow Up  Future Appointments   Date Time Provider Polly Armendariz   4/8/2020  9:30 AM MD Vitor Herrera [facility]   4/30/2020  9:45 AM Adolph PresMD yisel [facility]       Health Maintenance  Health Maintenance Due   Topic Date Due    Lipid screen  12/27/2019       Haven Ulloa RN

## 2020-01-14 NOTE — PROGRESS NOTES
education:  Excellent     Time Spent with Home O2 Set Up:  15  minutes     Julieta Guerrero RCP on 1/14/2020 at 12:24 PM                 .

## 2020-01-14 NOTE — PLAN OF CARE
Problem: Falls - Risk of:  Goal: Will remain free from falls  Description  Will remain free from falls  Outcome: Ongoing  Note:   Patient at risk for falls. Patient resting quietly in bed. Side rails up x 3. Bed locked in lowest position. Bed alarm on. Bedside table and call light within reach. Patient instructed to call for assistance. Patient verbalized understanding. Camera in room for safety. No attempts to get up on own. Hourly rounding in place. Will continue to monitor. Problem: Pain:  Goal: Pain level will decrease  Description  Pain level will decrease  Outcome: Ongoing  Note:   No complaints of pain during the shift. Will continue to assess.

## 2020-01-15 ENCOUNTER — CARE COORDINATION (OUTPATIENT)
Dept: CASE MANAGEMENT | Age: 85
End: 2020-01-15

## 2020-01-15 PROCEDURE — 1111F DSCHRG MED/CURRENT MED MERGE: CPT | Performed by: INTERNAL MEDICINE

## 2020-01-15 NOTE — PROGRESS NOTES
Pt d/c at this time reviewed paperwork and instructions with pt and family all questioned answered IV and tele removed pt transferred out via w/c to waiting car with family left with belongings.

## 2020-01-15 NOTE — CARE COORDINATION
[facility] Transitions Initial Follow Up Call    Call within 2 business days of discharge: Yes    Patient: Kitty Borja Patient : 1931   MRN: [id number]   Reason for Admission: Anemia, CHF  Discharge Date: 20 RARS: Readmission Risk Score: 16      Last Discharge [facility]       Complaint Diagnosis Description Type Department Provider    1/10/20 Anemia Anemia, unspecified type . .. ED to Hosp-Admission (Discharged) (ADMITTED) [facility] Becky Mcallister MD; Oriana Moore,... Spoke with: Daughter Kehinde Sales: [facility] [email or web address]    Non-face-to-face services provided:  Obtained and reviewed discharge summary and/or continuity of care documents  Education of patient/family/caregiver/guardian to support self-management-. Assessment and support for treatment adherence and medication management-.    Care Transitions 24 Hour Call    Schedule Follow Up Appointment with PCP:  Declined  Do you have any ongoing symptoms?:  No  Do you have a copy of your discharge instructions?:  Yes  Do you have all of your prescriptions and are they filled?:  Yes  Have you been contacted by a [facility] Pharmacist?:  No  Have you scheduled your follow up appointment?:  No  Were you discharged with any Home Care or Post Acute Services:  Yes  Post Acute Services:  Home Health (Comment: [facility] )  Patient DME:  Straight cane, Shower chair, Other, Walker  Other Patient DME:  [facility]  Do you have support at home?:  Partner/Spouse/SO  Do you feel like you have everything you need to keep you well at home?:  Yes  Are you an active caregiver in your home?:  No  Care Transitions Interventions  No Identified Needs                               Summary  CTN spoke with patient's daughter this afternoon for initial 24 hour discharge follow up CTN call. Daughter Sumaya Carpenter states patient is doing well, reports no complaints of any fatigue, nausea, vomiting, fevers, chills, SOB or Cough.    No LE Edema, difficulty with urination, BM's or Appetite. CTN encouraged daughter to make sure patient is resting as needed, is following low sodium diet, monitor for any new bleeding- black tarry stools, blood in urine, bleeding from gums, or any new onset of fatigue and feeling really tired. CTN and Patient completed 1111f, all medications filled and in home. [facility] with [facility] will be in home on Friday, per daughter. CTN provided education on s/s that require medical attention and when to seek medical attention. CTN advised Pt of use of urgent care or physicians 24 hr access line if assistance is needed after hours or weekend. Pt denies any needs or concerns and is agreeable with additional calls.     Follow Up  Future Appointments   Date Time Provider Polly Armendariz   4/8/2020  9:30 AM MD Vitor Spencer   4/30/2020  9:45 AM MD MARTIN Parr RN

## 2020-01-17 ENCOUNTER — TELEPHONE (OUTPATIENT)
Dept: PRIMARY CARE CLINIC | Age: 85
End: 2020-01-17

## 2020-01-21 ENCOUNTER — CARE COORDINATION (OUTPATIENT)
Dept: CASE MANAGEMENT | Age: 85
End: 2020-01-21

## 2020-01-21 NOTE — CARE COORDINATION
hours or weekend. Pt denies any needs or concerns and is agreeable with additional calls.     Follow Up  Future Appointments   Date Time Provider Polly Armendariz   [facility]   4/8/2020  9:30 AM MD Vitor Hatfield [facility]   4/30/2020  9:45 AM MD MARTIN Hernandez [facility]       Edmundo Hinojosa RN

## 2020-01-24 ENCOUNTER — CARE COORDINATION (OUTPATIENT)
Dept: CASE MANAGEMENT | Age: 85
End: 2020-01-24

## 2020-01-24 NOTE — CARE COORDINATION
1/30/2020 12:15 PM Leonardo Lai [facility]   4/8/2020  9:30 AM MD Vitor Solis [facility]   4/30/2020  9:45 AM MD MARTIN Crooks [facility]       Maria Cabrera RN

## 2020-01-28 ENCOUNTER — CARE COORDINATION (OUTPATIENT)
Dept: CASE MANAGEMENT | Age: 85
End: 2020-01-28

## 2020-01-28 NOTE — CARE COORDINATION
MD Vitor Smith [facility]   4/30/2020  9:45 AM MD MARTIN Hernandez [facility]       Edmundo Hinojosa RN

## 2020-02-07 ENCOUNTER — TELEPHONE (OUTPATIENT)
Dept: CARDIOLOGY CLINIC | Age: 85
End: 2020-02-07

## 2020-02-07 ENCOUNTER — ANTI-COAG VISIT (OUTPATIENT)
Dept: PHARMACY | Age: 85
End: 2020-02-07

## 2020-02-07 LAB — INR BLD: 2.7

## 2020-02-07 NOTE — TELEPHONE ENCOUNTER
The patient's daughter Joanie Navarro called requesting a INR home monitor prescription.      [facility] [address], [facility] [phone]     Last Office Visit: 04/03/19    Next Office Visit: 04/08/20    Please notify Joanie Navarro once the script is sent [phone]

## 2020-02-07 NOTE — PROGRESS NOTES
ANTICOAGULATION SERVICE    Izabella Segovia is a 80 y.o. male with PMHx significant for A-fib/A-flutter, HTN, recent left central retinal artery occlusion who had INR drawn by [facility] on 2/7/2020 for anticoagulation monitoring and adjustment. Patient has been taking warfarin for a couple of years. Patient usually presents to clinic with his daughter Rome Hunt, who helps with his medical care.       Anticoagulation Indication(s):  Afib, Aflutter    Referring Physician:  Dr. Candice Woodard  Goal INR Range:  2-3  Duration of Anticoagulation Therapy:  Indefinite  Time of day dose taken:  PM  Product patient has at home:  warfarin 5 mg (peach), 2.5 mg (green)    [id number] Score for Atrial Fibrillation Stroke Risk   Risk   Factors  Component Value   C CHF Yes 1   H HTN Yes 1   A2 Age >= 76 Yes,  (80 y.o.) 2   D DM No 0   S2 Prior Stroke/TIA No 0   V Vascular Disease No 0   A Age 74-69 No,  (80 y.o.) 0   Sc Sex male 0    [id number]  Score  4   Score last updated 6/12/19 2:29 PM    INR Summary                             Warfarin regimen (mg)  Date INR   A/P    Sun Mon Tue Wed Thu Fri Sat Mg/wk  2/7 2.7 At goal, no change  2.5 5 2.5 2.5 2.5 5 2.5 22.5  1/30 2.0 At goal, no change  2.5 5 2.5 2.5 2.5 5 2.5 22.5   1/9 3.03 At goal, see plan  12/19 2.1 At goal, no change  2.5 5 2.5 2.5 2.5 5 2.5 22.5  12/4 3.4 Above goal, decrease  2.5 5 2.5 2.5 2.5 5 2.5 22.5  11/7 3.0 At goal, no change  2.5 5 2.5 5 2.5 5 2.5 25  10/17 2.5 At goal, no change  2.5 5 2.5 5 2.5 5 2.5 25  10/3 2.3 At goal, no change  2.5 5 2.5 5 2.5 5 2.5 25   9/17 2.2 Per UC ED  9/5 3.0 At goal, no change  2.5 5 2.5 5 2.5 5 2.5 25   8/23 3.8 Above goal, hold + dec 2.5 5 2.5 5 2.5 0/5 2.5 25  8/2 3.1 Above goal, continue  2.5 5 2.5 5 2.5 5 5 27.5   7/19 3.2 Above goal, decrease x1 2.5 5 2.5 5 2.5 2.5/5 5 27.5   7/3 1.8 Below goal (held doses) 2.5 5 2.5 5 2.5 5 5 27.5  6/12 1.8 Below goal, increase  2.5 5 2.5 5 2.5 5 5 27.5  5/31 2.0 At goal, no change  2.5 5 2.5 5 2.5 5 2.5 25  5/15 1.8 Below goal, increase  2.5 5 2.5 5 2.5 5 2.5 25     5/3 1.8 Below goal, increase  2.5 2.5 2.5 2.5 2.5 5 2.5 20   4/17 1.7 Below goal (held doses) 2.5 2.5 2.5 2.5 2.5 2.5 2.5 17.5  3/29 2.6 At goal, no change  2.5 2.5 2.5 2.5 2.5 2.5 2.5 17.5  3/14 2.7 At goal, no change  2.5 2.5 2.5 2.5 2.5 2.5 2.5 17.5  3/7 2.5 At goal, no change  2.5 2.5 2.5 2.5 2.5 2.5 2.5 17.5  3/2 2.02 On discharge from [facility] 2.5 2.5 2.5 2.5 2.5 2.5 2.5 17.5  2/4 2.4 At goal, no change  5 2.5 5 2.5 5 2.5 5 27.5  1/25 3.1 Above goal, decrease  5 2.5 5 2.5 5 2.5 5 27.5  1/11 2.8 At goal, no change  5 2.5 5 5 2.5 5 5 30  1/2 3.0 At goal, decrease  5 2.5 5 5 2.5 5 5          30          Last CBC:  Lab Results   Component Value Date    RBC 3.65 (L) 01/14/2020    HGB 9.6 (L) 01/14/2020    HCT 28.9 (L) 01/14/2020    MCV 79.2 (L) 01/14/2020    MCH 26.3 01/14/2020    MPV 8.0 01/14/2020    RDW 17.4 (H) 01/14/2020     01/14/2020       Patient History:  Recent hospitalizations/HC visits -1/10-1/14 Admit [facility] for low Hgb and anemia workup: patient underwent EGD + colonoscopy; colonoscopy revealed several polyps and minor diverticulosis; warfarin was held during admission; on discharge, Dr. Kaila Jane (GI) recommended holding warfarin for 1 week and resuming it on 1/21   -9/17  ED for weakness, SOB: stopped Cymbalta due to suspected adverse effects; INR=2.2  -8/7  ED for sprained ankle    -7/10 [facility] ED for abdominal pain   -6/27 Epidural injection per Dr. Natividad Armenta at [facility]  -4/11 Epidural injection at [address] [place] [address] and Spine  -2/24-3/2 Admit [facility] for right toe + hip pain, diarrhea, VAL on CKD, elevated INR: patient was treated for pain and for C. diff; INR and creatinine stable on discharge    Recent medication changes 12/4 start Lexapro   9/17 d/c Cymbalta      Medications taken regularly that may interact with warfarin or alter INR ASA 81 mg, amiodarone 200 mg/d   Warfarin dose taken as prescribed Yes, does not use pillbox, but has own system of taking medications that works very well    Signs/symptoms of bleeding No h/o bleeding reported   Vitamin K intake -Started drinking Ensure (25% vit K) once daily in late September  -Normally has ~0 servings of green, leafy vegetables per week   Recent vomiting/diarrhea/fever, changes in weight or activity level Overall decreased appetite recently   Tobacco or alcohol use Patient quit smoking in 1955  Patient quit drinking alcohol at least 5 years ago   Upcoming surgeries or procedures Third epidural injection possible in future, but nothing scheduled currently     Assessment/Plan:  Patient's INR was drawn by Coco Zamora: [phone]) and was therapeutic today (2.7). RN denies any warfarin dosing errors, diet changes, medication changes, antibiotics/steroids, illness, bleeding, etc since last visit. Due to decreased appetite and poor diet, patient started drinking Ensure once daily at the end of September, which has not changed. RN was instructed to continue warfarin 2.5 mg daily, except 5 mg on Mon+Fri. Repeat INR in 1 week. Patient was reminded to maintain consistent vitamin K intake and call with any bleeding, medication changes, or fever/vomiting/diarrhea. At clinic visit on 1/9:  POC INR was 4.6, but the CoaguChek meter indicated an error with the test.  According to the , the \"c\" error on the CoaguChek machine may be due to low hematocrit. Therefore, it is recommended to obtain venipuncture INR instead. STAT INR was ordered along with CBC to assess H&H. Although INR was therapeutic (3.03), hemoglobin and hematocrit were both low (8.1g/dL and 24.6%). Given that patient's baseline hemoglobin is ~11-13g/dL, and the most recent level I could find was 11.4g/dL in September, I contacted patient's PCP (Dr. Kenrick Sin) for advice. Due to patient's drop in Hgb, advanced age, and taking warfarin, she recommended that he go to ED for evaluation. Although patient denied any active bleeding in clinic, he did not have a GI physician that he could see urgently for eval.  Patient's daughter Harsh Lester) was called to recommend that patient go to ED for evaluation. She was informed that significant drop in Hgb could indicate internal bleeding and she was agreeable to seeking emergency medical care. No warfarin dosing instructions were given at that time. Patient was then admitted to [facility] and underwent EGD and colonoscopy. The colonoscopy revealed several polyps and minor diverticulosis. Warfarin was held during patient's hospitalization and Dr. Debbie Russ (GI) recommended holding warfarin for 1 week and resuming it on 1/21. Patient understands dosing directions and information discussed. Dosing schedule and follow up appointment given to patient. Progress note routed to referring physician's office. Patient acknowledges working in consult agreement with pharmacist as referred by his/her physician. Next INR Check:  2/13 per [facility]    Please call [facility] at [phone] with any questions. Thanks! Moustapha Parsons.  Lexis Dahl, PharmD, [facility]  [facility]  Ph: [phone]  2/7/2020 3:58 PM

## 2020-02-13 ENCOUNTER — ANTI-COAG VISIT (OUTPATIENT)
Dept: PHARMACY | Age: 85
End: 2020-02-13

## 2020-02-13 LAB — INR BLD: 4.6

## 2020-02-13 NOTE — PROGRESS NOTES
ANTICOAGULATION SERVICE    Elier Ny is a 80 y.o. male with PMHx significant for A-fib/A-flutter, HTN, recent left central retinal artery occlusion who had INR drawn by [facility] on 2/13/2020 for anticoagulation monitoring and adjustment. Patient has been taking warfarin for a couple of years. Patient usually presents to clinic with his daughter Ann Armstrong), who helps with his medical care.       Anticoagulation Indication(s):  Afib, Aflutter    Referring Physician:  Dr. Beltran Mortensen  Goal INR Range:  2-3  Duration of Anticoagulation Therapy:  Indefinite  Time of day dose taken:  PM  Product patient has at home:  warfarin 5 mg (peach), 2.5 mg (green)    [id number] Score for Atrial Fibrillation Stroke Risk   Risk   Factors  Component Value   C CHF Yes 1   H HTN Yes 1   A2 Age >= 76 Yes,  (80 y.o.) 2   D DM No 0   S2 Prior Stroke/TIA No 0   V Vascular Disease No 0   A Age 74-69 No,  (80 y.o.) 0   Sc Sex male 0    [id number]  Score  4   Score last updated 6/12/19 2:29 PM    INR Summary                             Warfarin regimen (mg)  Date INR   A/P    Sun Mon Tue Wed Thu Fri Sat Mg/wk  2/13 4.6 Above goal, hold x 2  2.5 5 2.5 2.5 0/2.5 0/5 2.5 22.5  2/7 2.7 At goal, no change  2.5 5 2.5 2.5 2.5 5 2.5 22.5  1/30 2.0 At goal, no change  2.5 5 2.5 2.5 2.5 5 2.5 22.5   1/9 3.03 At goal, see plan  12/19 2.1 At goal, no change  2.5 5 2.5 2.5 2.5 5 2.5 22.5  12/4 3.4 Above goal, decrease  2.5 5 2.5 2.5 2.5 5 2.5 22.5  11/7 3.0 At goal, no change  2.5 5 2.5 5 2.5 5 2.5 25  10/17 2.5 At goal, no change  2.5 5 2.5 5 2.5 5 2.5 25  10/3 2.3 At goal, no change  2.5 5 2.5 5 2.5 5 2.5 25   9/17 2.2 Per UC ED  9/5 3.0 At goal, no change  2.5 5 2.5 5 2.5 5 2.5 25   8/23 3.8 Above goal, hold + dec 2.5 5 2.5 5 2.5 0/5 2.5 25  8/2 3.1 Above goal, continue  2.5 5 2.5 5 2.5 5 5 27.5   7/19 3.2 Above goal, decrease x1 2.5 5 2.5 5 2.5 2.5/5 5 27.5   7/3 1.8 Below goal (held doses) 2.5 5 2.5 5 2.5 5 5 27.5  6/12 1.8 Below goal, increase  2.5 5 2.5 5 2.5 5 5 27.5  5/31 2.0 At goal, no change  2.5 5 2.5 5 2.5 5 2.5 25  5/15 1.8 Below goal, increase  2.5 5 2.5 5 2.5 5 2.5 25     5/3 1.8 Below goal, increase  2.5 2.5 2.5 2.5 2.5 5 2.5 20   4/17 1.7 Below goal (held doses) 2.5 2.5 2.5 2.5 2.5 2.5 2.5 17.5  3/29 2.6 At goal, no change  2.5 2.5 2.5 2.5 2.5 2.5 2.5 17.5  3/14 2.7 At goal, no change  2.5 2.5 2.5 2.5 2.5 2.5 2.5 17.5  3/7 2.5 At goal, no change  2.5 2.5 2.5 2.5 2.5 2.5 2.5 17.5  3/2 2.02 On discharge from [facility] 2.5 2.5 2.5 2.5 2.5 2.5 2.5 17.5  2/4 2.4 At goal, no change  5 2.5 5 2.5 5 2.5 5 27.5  1/25 3.1 Above goal, decrease  5 2.5 5 2.5 5 2.5 5 27.5  1/11 2.8 At goal, no change  5 2.5 5 5 2.5 5 5 30  1/2 3.0 At goal, decrease  5 2.5 5 5 2.5 5 5          30          Last CBC:  Lab Results   Component Value Date    RBC 3.65 (L) 01/14/2020    HGB 9.6 (L) 01/14/2020    HCT 28.9 (L) 01/14/2020    MCV 79.2 (L) 01/14/2020    MCH 26.3 01/14/2020    MPV 8.0 01/14/2020    RDW 17.4 (H) 01/14/2020     01/14/2020       Patient History:  Recent hospitalizations/HC visits -1/10-1/14 Admit [facility] for low Hgb and anemia workup: patient underwent EGD + colonoscopy; colonoscopy revealed several polyps and minor diverticulosis; warfarin was held during admission; on discharge, Dr. Trang Smith (GI) recommended holding warfarin for 1 week and resuming it on 1/21   -9/17  ED for weakness, SOB: stopped Cymbalta due to suspected adverse effects; INR=2.2  -8/7  ED for sprained ankle    -7/10 [facility] ED for abdominal pain   -6/27 Epidural injection per Dr. Zayda Bennett at [facility]  -4/11 Epidural injection at [address] [place] [address] and Spine  -2/24-3/2 Admit [facility] for right toe + hip pain, diarrhea, VAL on CKD, elevated INR: patient was treated for pain and for C. diff; INR and creatinine stable on discharge    Recent medication changes 12/4 start Lexapro   Medications taken regularly that may interact with warfarin or alter INR ASA 81 mg, amiodarone 200 mg/d both low (8.1g/dL and 24.6%). Given that patient's baseline hemoglobin is ~11-13g/dL, and the most recent level I could find was 11.4g/dL in September, I contacted patient's PCP (Dr. Chester Robins) for advice. Due to patient's drop in Hgb, advanced age, and taking warfarin, she recommended that he go to ED for evaluation. Although patient denied any active bleeding in clinic, he did not have a GI physician that he could see urgently for eval.  Patient's daughter Maged Retana) was called to recommend that patient go to ED for evaluation. She was informed that significant drop in Hgb could indicate internal bleeding and she was agreeable to seeking emergency medical care. No warfarin dosing instructions were given at that time. Patient was then admitted to [facility] and underwent EGD and colonoscopy. The colonoscopy revealed several polyps and minor diverticulosis. Warfarin was held during patient's hospitalization and Dr. Denver Crumb (GI) recommended holding warfarin for 1 week and resuming it on 1/21. Patient understands dosing directions and information discussed. Dosing schedule and follow up appointment given to patient. Progress note routed to referring physician's office. Patient acknowledges working in consult agreement with pharmacist as referred by his/her physician. Next INR Check:  2/20 per [facility]    Please call [facility] at [phone] with any questions. Thanks! Rigo Quiñonez.  Naina Ramos, PharmD, [facility]  [facility]  Ph: [phone]  2/13/2020 3:14 PM

## 2020-02-20 ENCOUNTER — ANTI-COAG VISIT (OUTPATIENT)
Dept: PHARMACY | Age: 85
End: 2020-02-20

## 2020-02-20 LAB — INR BLD: 1.9

## 2020-02-20 NOTE — PROGRESS NOTES
warfarin or alter INR ASA 81 mg, amiodarone 200 mg/d   Warfarin dose taken as prescribed Yes, does not use pillbox, but has own system of taking medications that works very well    Signs/symptoms of bleeding No h/o bleeding reported   Vitamin K intake -Started drinking Ensure (25% vit K) once daily in late September  -Normally has ~0 servings of green, leafy vegetables per week   Recent vomiting/diarrhea/fever, changes in weight or activity level Overall decreased appetite recently   Tobacco or alcohol use Patient quit smoking in 1955  Patient quit drinking alcohol at least 5 years ago   Upcoming surgeries or procedures Third epidural injection possible in future, but nothing scheduled currently     Assessment/Plan:  Patient's INR was drawn by Coco Basilio Bucks: [phone]) and was subtherapeutic today (1.9) after holding two warfarin doses last week for INR of 4.6. Elevated INR last week was thought to be due to patient not drinking his daily Ensure drinks, which contain vitamin K. Spoke with daughter Yuliet Wagoner), who denies any warfarin dosing errors, medication changes, antibiotics/steroids, illness, bleeding, etc since last check. Patient has resumed the Ensure drinks according to his daughter. Due to significantly elevated INR last week, RN and daughter were instructed to decrease warfarin dose to 2.5 mg daily, except 5 mg on Fridays only. Elena Angela will make the change in patient's pillbox. Repeat INR in 1 week. Patient was reminded to maintain consistent vitamin K intake and call with any bleeding, medication changes, or fever/vomiting/diarrhea. At clinic visit on 1/9:  POC INR was 4.6, but the CoaguChek meter indicated an error with the test.  According to the , the \"c\" error on the CoaguChek machine may be due to low hematocrit. Therefore, it is recommended to obtain venipuncture INR instead. STAT INR was ordered along with CBC to assess H&H.   Although INR was therapeutic (3.03),

## 2020-02-24 ENCOUNTER — OFFICE VISIT (OUTPATIENT)
Dept: PRIMARY CARE CLINIC | Age: 85
End: 2020-02-24
Payer: MEDICARE

## 2020-02-24 VITALS
HEIGHT: 71 IN | WEIGHT: 175 LBS | BODY MASS INDEX: 24.5 KG/M2 | DIASTOLIC BLOOD PRESSURE: 70 MMHG | SYSTOLIC BLOOD PRESSURE: 126 MMHG | OXYGEN SATURATION: 97 % | HEART RATE: 59 BPM

## 2020-02-24 DIAGNOSIS — M79.10 MYALGIA: ICD-10-CM

## 2020-02-24 LAB
ANION GAP SERPL CALCULATED.3IONS-SCNC: 14 MMOL/L (ref 3–16)
BUN BLDV-MCNC: 20 MG/DL (ref 7–20)
CALCIUM SERPL-MCNC: 8.8 MG/DL (ref 8.3–10.6)
CHLORIDE BLD-SCNC: 97 MMOL/L (ref 99–110)
CO2: 26 MMOL/L (ref 21–32)
CREAT SERPL-MCNC: 2.4 MG/DL (ref 0.8–1.3)
GFR AFRICAN AMERICAN: 31
GFR NON-AFRICAN AMERICAN: 26
GLUCOSE BLD-MCNC: 91 MG/DL (ref 70–99)
MAGNESIUM: 1.9 MG/DL (ref 1.8–2.4)
POTASSIUM SERPL-SCNC: 4.4 MMOL/L (ref 3.5–5.1)
SODIUM BLD-SCNC: 137 MMOL/L (ref 136–145)
TOTAL CK: 67 U/L (ref 39–308)

## 2020-02-24 PROCEDURE — 99213 OFFICE O/P EST LOW 20 MIN: CPT | Performed by: INTERNAL MEDICINE

## 2020-02-24 RX ORDER — FERROUS SULFATE 325(65) MG
325 TABLET ORAL 2 TIMES DAILY
Qty: 60 TABLET | Refills: 2 | Status: SHIPPED | OUTPATIENT
Start: 2020-02-24

## 2020-02-24 RX ORDER — DOCUSATE SODIUM 100 MG/1
100 CAPSULE, LIQUID FILLED ORAL 2 TIMES DAILY PRN
Qty: 60 CAPSULE | Refills: 1 | Status: SHIPPED | OUTPATIENT
Start: 2020-02-24

## 2020-02-24 SDOH — ECONOMIC STABILITY: INCOME INSECURITY: HOW HARD IS IT FOR YOU TO PAY FOR THE VERY BASICS LIKE FOOD, HOUSING, MEDICAL CARE, AND HEATING?: NOT HARD AT ALL

## 2020-02-24 SDOH — ECONOMIC STABILITY: TRANSPORTATION INSECURITY
IN THE PAST 12 MONTHS, HAS THE LACK OF TRANSPORTATION KEPT YOU FROM MEDICAL APPOINTMENTS OR FROM GETTING MEDICATIONS?: NO

## 2020-02-24 SDOH — ECONOMIC STABILITY: TRANSPORTATION INSECURITY
IN THE PAST 12 MONTHS, HAS LACK OF TRANSPORTATION KEPT YOU FROM MEETINGS, WORK, OR FROM GETTING THINGS NEEDED FOR DAILY LIVING?: NO

## 2020-02-24 SDOH — ECONOMIC STABILITY: FOOD INSECURITY: WITHIN THE PAST 12 MONTHS, YOU WORRIED THAT YOUR FOOD WOULD RUN OUT BEFORE YOU GOT MONEY TO BUY MORE.: NEVER TRUE

## 2020-02-24 SDOH — ECONOMIC STABILITY: FOOD INSECURITY: WITHIN THE PAST 12 MONTHS, THE FOOD YOU BOUGHT JUST DIDN'T LAST AND YOU DIDN'T HAVE MONEY TO GET MORE.: NEVER TRUE

## 2020-02-24 ASSESSMENT — PATIENT HEALTH QUESTIONNAIRE - PHQ9
2. FEELING DOWN, DEPRESSED OR HOPELESS: 0
SUM OF ALL RESPONSES TO PHQ QUESTIONS 1-9: 0
SUM OF ALL RESPONSES TO PHQ QUESTIONS 1-9: 0
1. LITTLE INTEREST OR PLEASURE IN DOING THINGS: 0
SUM OF ALL RESPONSES TO PHQ9 QUESTIONS 1 & 2: 0

## 2020-02-24 NOTE — PROGRESS NOTES
Mariana Vega   Date ofBirth:  11/9/1931    Date of Visit:  2/24/2020    Chief Complaint   Patient presents with    Anemia       HPI  Anemia- Pt is not on iron. Pt has labs done on 2/20/20. Hemoglobin 8.8, hematocrit 27.8, and iron level is 43. Pt had EGD and colonoscopy done in January. EGD unremarkable. Colonoscopy showed polyps and minor diverticulosis. Pt denies hematuria and rectal bleeding. Pt doesn't eat green leafy vegetables. Pt eats ground red meat. Pt is not on iron. Pt states this condition is making him tired. Pt denies cold intolerance. Pt states he has upper body pain after getting up and walking 4-6 steps. Pt states if he is sitting he doesn't have pain. Pt's daughter states patient is in a recliner for hours. Pt sleeps and and is in his recliner if he is not eating. Review of Systems   Constitutional: Positive for fatigue. Negative for chills and fever. HENT: Negative for congestion, postnasal drip, rhinorrhea and sore throat. Eyes: Negative for visual disturbance. Respiratory: Negative for cough, chest tightness and shortness of breath. Cardiovascular: Negative for chest pain, palpitations and leg swelling. Gastrointestinal: Negative for abdominal pain, constipation, diarrhea, nausea and vomiting. Endocrine: Negative for cold intolerance. Genitourinary: Negative for dysuria and frequency. Musculoskeletal: Positive for myalgias. Neurological: Negative for dizziness, light-headedness and headaches.        No Known Allergies  Outpatient Medications Marked as Taking for the 2/24/20 encounter (Office Visit) with Adolph Goddard MD   Medication Sig Dispense Refill    tiZANidine (ZANAFLEX) 2 MG tablet Take 1 mg by mouth daily      lidocaine 4 % external patch Place 1 patch onto the skin daily 1 box 1    warfarin (COUMADIN) 2.5 MG tablet Take 1 tablet by mouth daily Or as directed by clinic (do not discontinue warfarin 5 mg order)  Per GI, start taking coumadin on 1/21/2020 30 tablet 3    warfarin (COUMADIN) 5 MG tablet TAKE ONE TABLET BY MOUTH DAILY  Per GI, do not resume coumadin until 1/21/2020 90 tablet 2    furosemide (LASIX) 40 MG tablet Take 0.5 tablets by mouth daily Indications: taking half daily 30 tablet 3    escitalopram (LEXAPRO) 10 MG tablet Take 1 tablet by mouth daily 90 tablet 3    atorvastatin (LIPITOR) 80 MG tablet TAKE ONE TABLET BY MOUTH DAILY 30 tablet 4    diltiazem (CARTIA XT) 300 MG extended release capsule TAKE ONE CAPSULE BY MOUTH DAILY 90 capsule 3    amiodarone (CORDARONE) 200 MG tablet TAKE ONE TABLET BY MOUTH DAILY 30 tablet 5    spironolactone (ALDACTONE) 25 MG tablet TAKE ONE-HALF TABLET BY MOUTH DAILY 30 tablet 5    zoster recombinant adjuvanted vaccine (SHINGRIX) 50 MCG/0.5ML SUSR injection Inject 0.5 mLs into the muscle See Admin Instructions 1 dose now and repeat in 2-6 months 0.5 mL 1    Handicap Placard MISC by Does not apply route Patient cannot ambulate more than 200 ft  Expiration date 1/2024 1 each 0         Vitals:    02/24/20 1206   BP: 126/70   Site: Right Upper Arm   Position: Sitting   Cuff Size: Medium Adult   Pulse: 59   SpO2: 97%   Weight: 175 lb (79.4 kg)   Height: 5' 11\" (1.803 m)     Body mass index is 24.41 kg/m². Physical Exam  Constitutional:       General: He is not in acute distress. Appearance: He is well-developed. HENT:      Mouth/Throat:      Pharynx: Oropharynx is clear. Eyes:      General: Lids are normal.      Extraocular Movements: Extraocular movements intact. Pupils: Pupils are equal, round, and reactive to light. Neck:      Musculoskeletal: Neck supple. Thyroid: No thyromegaly. Vascular: No carotid bruit. Cardiovascular:      Rate and Rhythm: Normal rate and regular rhythm. Heart sounds: Normal heart sounds, S1 normal and S2 normal. No murmur. No friction rub. No gallop. Pulmonary:      Effort: Pulmonary effort is normal. No respiratory distress. 3.0    There may be more visits with results that are not included. Assessment/Plan     1. Iron deficiency anemia, unspecified iron deficiency anemia type  -Referral to SHERIF Lubin MD, Oncology, [place]  - ferrous sulfate 325 (65 Fe) MG tablet; Take 1 tablet by mouth 2 times daily  Dispense: 60 tablet; Refill: 2  - docusate sodium (COLACE) 100 MG capsule; Take 1 capsule by mouth 2 times daily as needed for Constipation caused by iron Dispense: 60 capsule; Refill: 1    2. Myalgia  - Magnesium; Future  - CK; Future  - Basic Metabolic Panel; Future  - Tylenol 650mg 3 times daily as needed  - Tizanidine 2mg 1/2 daily as needed    3. At high risk for falls  On the basis of positive falls risk screening, assessment and plan is as follows: home safety tips provided. Discussed medications with patient, who voiced understanding of their use and indications. All questions answered. Return in about 3 weeks (around 3/16/2020) for anemia and myalgia.

## 2020-02-24 NOTE — PATIENT INSTRUCTIONS
1. Iron deficiency anemia, unspecified iron deficiency anemia type  -Referral to SHERIF Real MD, Oncology, [place]  - ferrous sulfate 325 (65 Fe) MG tablet; Take 1 tablet by mouth 2 times daily  Dispense: 60 tablet; Refill: 2  - docusate sodium (COLACE) 100 MG capsule; Take 1 capsule by mouth 2 times daily as needed for Constipation caused by iron Dispense: 60 capsule; Refill: 1    2. Myalgia  - Magnesium; Future  - CK; Future  - Basic Metabolic Panel; Future  - Tylenol 650mg 3 times daily as needed  - Tizanidine 2mg 1/2 daily as needed      Patient Education        Preventing Falls: Care Instructions  Your Care Instructions    Getting around your home safely can be a challenge if you have injuries or health problems that make it easy for you to fall. Loose rugs and furniture in walkways are among the dangers for many older people who have problems walking or who have poor eyesight. People who have conditions such as arthritis, osteoporosis, or dementia also have to be careful not to fall. You can make your home safer with a few simple measures. Follow-up care is a key part of your treatment and safety. Be sure to make and go to all appointments, and call your doctor if you are having problems. It's also a good idea to know your test results and keep a list of the medicines you take. How can you care for yourself at home? Taking care of yourself  · You may get dizzy if you do not drink enough water. To prevent dehydration, drink plenty of fluids, enough so that your urine is light yellow or clear like water. Choose water and other caffeine-free clear liquids. If you have kidney, heart, or liver disease and have to limit fluids, talk with your doctor before you increase the amount of fluids you drink. · Exercise regularly to improve your strength, muscle tone, and balance. Walk if you can. Swimming may be a good choice if you cannot walk easily.   · Have your vision and hearing checked each year or any time you notice a change. If you have trouble seeing and hearing, you might not be able to avoid objects and could lose your balance. · Know the side effects of the medicines you take. Ask your doctor or pharmacist whether the medicines you take can affect your balance. Sleeping pills or sedatives can affect your balance. · Limit the amount of alcohol you drink. Alcohol can impair your balance and other senses. · Ask your doctor whether calluses or corns on your feet need to be removed. If you wear loose-fitting shoes because of calluses or corns, you can lose your balance and fall. · Talk to your doctor if you have numbness in your feet. Preventing falls at home  · Remove raised doorway thresholds, throw rugs, and clutter. Repair loose carpet or raised areas in the floor. · Move furniture and electrical cords to keep them out of walking paths. · Use nonskid floor wax, and wipe up spills right away, especially on ceramic tile floors. · If you use a walker or cane, put rubber tips on it. If you use crutches, clean the bottoms of them regularly with an abrasive pad, such as steel wool. · Keep your house well lit, especially Marcelyn Sam, and outside walkways. Use night-lights in areas such as hallways and bathrooms. Add extra light switches or use remote switches (such as switches that go on or off when you clap your hands) to make it easier to turn lights on if you have to get up during the night. · Install sturdy handrails on stairways. · Move items in your cabinets so that the things you use a lot are on the lower shelves (about waist level). · Keep a cordless phone and a flashlight with new batteries by your bed. If possible, put a phone in each of the main rooms of your house, or carry a cell phone in case you fall and cannot reach a phone. Or, you can wear a device around your neck or wrist. You push a button that sends a signal for help.   · Wear low-heeled shoes that fit well and give your feet good support. Use footwear with nonskid soles. Check the heels and soles of your shoes for wear. Repair or replace worn heels or soles. · Do not wear socks without shoes on wood floors. · Walk on the grass when the sidewalks are slippery. If you live in an area that gets snow and ice in the winter, sprinkle salt on slippery steps and sidewalks. Preventing falls in the bath  · Install grab bars and nonskid mats inside and outside your shower or tub and near the toilet and sinks. · Use shower chairs and bath benches. · Use a hand-held shower head that will allow you to sit while showering. · Get into a tub or shower by putting the weaker leg in first. Get out of a tub or shower with your strong side first.  · Repair loose toilet seats and consider installing a raised toilet seat to make getting on and off the toilet easier. · Keep your bathroom door unlocked while you are in the shower. Where can you learn more? Go to [email or web address] org and sign in to your [employer] account. Enter [phone] [id number] [phone] in the [facility] box to learn more about \"Preventing Falls: Care Instructions. \"     If you do not have an account, please click on the \"Sign Up Now\" link. Current as of: August 6, 2019  Content Version: 12.3  © 4534-5589 Healthwise, [facility]. Care instructions adapted under license by [facility] ([facility]). If you have questions about a medical condition or this instruction, always ask your healthcare professional. [place] [address] any warranty or liability for your use of this information.

## 2020-02-27 ENCOUNTER — ANTI-COAG VISIT (OUTPATIENT)
Dept: PHARMACY | Age: 85
End: 2020-02-27

## 2020-02-27 LAB — INR BLD: 2.4

## 2020-02-27 ASSESSMENT — ENCOUNTER SYMPTOMS
DIARRHEA: 0
SHORTNESS OF BREATH: 0
CONSTIPATION: 0
VOMITING: 0
NAUSEA: 0
COUGH: 0
ABDOMINAL PAIN: 0
RHINORRHEA: 0
SORE THROAT: 0
CHEST TIGHTNESS: 0

## 2020-02-27 NOTE — PROGRESS NOTES
ANTICOAGULATION SERVICE    Sonya Resendez is a 80 y.o. male with PMHx significant for A-fib/A-flutter, HTN, recent left central retinal artery occlusion who had INR drawn by [facility] on 2/27/2020 for anticoagulation monitoring and adjustment. Patient has been taking warfarin for a couple of years. Patient usually presents to clinic with his daughter Christopher Child), who helps with his medical care.       Anticoagulation Indication(s):  Afib, Aflutter    Referring Physician:  Dr. Izabella Zepeda  Goal INR Range:  2-3  Duration of Anticoagulation Therapy:  Indefinite  Time of day dose taken:  PM  Product patient has at home:  warfarin 5 mg (peach), 2.5 mg (green)    [id number] Score for Atrial Fibrillation Stroke Risk   Risk   Factors  Component Value   C CHF Yes 1   H HTN Yes 1   A2 Age >= 76 Yes,  (80 y.o.) 2   D DM No 0   S2 Prior Stroke/TIA No 0   V Vascular Disease No 0   A Age 74-69 No,  (80 y.o.) 0   Sc Sex male 0    [id number]  Score  4   Score last updated 6/12/19 2:29 PM    INR Summary                             Warfarin regimen (mg)  Date INR   A/P    Sun Mon Tue Wed Thu Fri Sat Mg/wk  2/27 2.4 At goal, no change  2.5 2.5 2.5 2.5 2.5 5 2.5 20  2/20 1.9 Below goal, decrease  2.5 2.5 2.5 2.5 2.5 5 2.5 20  2/13 4.6 Above goal, hold x 2  2.5 5 2.5 2.5 0/2.5 0/5 2.5 22.5  2/7 2.7 At goal, no change  2.5 5 2.5 2.5 2.5 5 2.5 22.5  1/30 2.0 At goal, no change  2.5 5 2.5 2.5 2.5 5 2.5 22.5   1/9 3.03 At goal, see plan  12/19 2.1 At goal, no change  2.5 5 2.5 2.5 2.5 5 2.5 22.5  12/4 3.4 Above goal, decrease  2.5 5 2.5 2.5 2.5 5 2.5 22.5  11/7 3.0 At goal, no change  2.5 5 2.5 5 2.5 5 2.5 25  10/17 2.5 At goal, no change  2.5 5 2.5 5 2.5 5 2.5 25  10/3 2.3 At goal, no change  2.5 5 2.5 5 2.5 5 2.5 25   9/17 2.2 Per UC ED  9/5 3.0 At goal, no change  2.5 5 2.5 5 2.5 5 2.5 25   8/23 3.8 Above goal, hold + dec 2.5 5 2.5 5 2.5 0/5 2.5 25  8/2 3.1 Above goal, continue  2.5 5 2.5 5 2.5 5 5 27.5   7/19 3.2 Above goal, decrease x1 2.5 5 2.5 5 2.5 2.5/5 5 27.5   7/3 1.8 Below goal (held doses) 2.5 5 2.5 5 2.5 5 5 27.5  6/12 1.8 Below goal, increase  2.5 5 2.5 5 2.5 5 5 27.5  5/31 2.0 At goal, no change  2.5 5 2.5 5 2.5 5 2.5 25  5/15 1.8 Below goal, increase  2.5 5 2.5 5 2.5 5 2.5 25     5/3 1.8 Below goal, increase  2.5 2.5 2.5 2.5 2.5 5 2.5 20   4/17 1.7 Below goal (held doses) 2.5 2.5 2.5 2.5 2.5 2.5 2.5 17.5  3/29 2.6 At goal, no change  2.5 2.5 2.5 2.5 2.5 2.5 2.5 17.5  3/14 2.7 At goal, no change  2.5 2.5 2.5 2.5 2.5 2.5 2.5 17.5  3/7 2.5 At goal, no change  2.5 2.5 2.5 2.5 2.5 2.5 2.5 17.5  3/2 2.02 On discharge from [facility] 2.5 2.5 2.5 2.5 2.5 2.5 2.5 17.5  2/4 2.4 At goal, no change  5 2.5 5 2.5 5 2.5 5 27.5  1/25 3.1 Above goal, decrease  5 2.5 5 2.5 5 2.5 5 27.5  1/11 2.8 At goal, no change  5 2.5 5 5 2.5 5 5 30  1/2 3.0 At goal, decrease  5 2.5 5 5 2.5 5 5          30          Last CBC:  Lab Results   Component Value Date    RBC 3.65 (L) 01/14/2020    HGB 9.6 (L) 01/14/2020    HCT 28.9 (L) 01/14/2020    MCV 79.2 (L) 01/14/2020    MCH 26.3 01/14/2020    MPV 8.0 01/14/2020    RDW 17.4 (H) 01/14/2020     01/14/2020       Patient History:  Recent hospitalizations/HC visits -1/10-1/14 Admit [facility] for low Hgb and anemia workup: patient underwent EGD + colonoscopy; colonoscopy revealed several polyps and minor diverticulosis; warfarin was held during admission; on discharge, Dr. Kb Silva (GI) recommended holding warfarin for 1 week and resuming it on 1/21   -9/17  ED for weakness, SOB: stopped Cymbalta due to suspected adverse effects; INR=2.2  -8/7  ED for sprained ankle    -7/10 [facility] ED for abdominal pain   -6/27 Epidural injection per Dr. Rashaad Delgado at [facility]  -4/11 Epidural injection at [address] [place] [address] [facility] Sp[facility]    Recent medication changes 2/24 start iron for anemia per PCP  12/4 start Lexapro per cardio   Medications taken regularly that may interact with warfarin or alter INR ASA 81 mg, amiodarone 200 mg/d   Warfarin recent level I could find was 11.4g/dL in September, I contacted patient's PCP (Dr. Harinder Mckenzie) for advice. Due to patient's drop in Hgb, advanced age, and taking warfarin, she recommended that he go to ED for evaluation. Although patient denied any active bleeding in clinic, he did not have a GI physician that he could see urgently for eval.  Patient's daughter Harsh Lester) was called to recommend that patient go to ED for evaluation. She was informed that significant drop in Hgb could indicate internal bleeding and she was agreeable to seeking emergency medical care. No warfarin dosing instructions were given at that time. Patient was then admitted to [place] [address] and underwent EGD and colonoscopy. The colonoscopy revealed several polyps and minor diverticulosis. Warfarin was held during patient's hospitalization and Dr. Debbie Russ (GI) recommended holding warfarin for 1 week and resuming it on 1/21. Patient understands dosing directions and information discussed. Dosing schedule and follow up appointment given to patient. Progress note routed to referring physician's office. Patient acknowledges working in consult agreement with pharmacist as referred by his/her physician. Next INR Check:  3/5 per [facility]    Please call [place] [address] [facility] at [phone] with any questions. Thanks! Moustapha Parsons.  Lexis Dahl, PharmD, BCPS  [place] [address] [facility]  Ph: [phone]  2/27/2020 2:58 PM

## 2020-03-03 ENCOUNTER — TELEPHONE (OUTPATIENT)
Dept: CARDIOLOGY CLINIC | Age: 85
End: 2020-03-03

## 2020-03-04 NOTE — TELEPHONE ENCOUNTER
Spoke with daughter.   Paperwork was faxed today, will contact Vandana from [facility]

## 2020-03-05 ENCOUNTER — ANTI-COAG VISIT (OUTPATIENT)
Dept: PHARMACY | Age: 85
End: 2020-03-05

## 2020-03-05 LAB — INR BLD: 2.8

## 2020-03-05 NOTE — PROGRESS NOTES
ANTICOAGULATION SERVICE    Emmanuel Concepcion is a 80 y.o. male with PMHx significant for A-fib/A-flutter, HTN, recent left central retinal artery occlusion who had INR drawn by [facility] on 3/5/2020 for anticoagulation monitoring and adjustment. Patient has been taking warfarin for a couple of years. Patient usually presents to clinic with his daughter Марина Crandall), who helps with his medical care.       Anticoagulation Indication(s):  Afib, Aflutter    Referring Physician:  Dr. Evette Priest  Goal INR Range:  2-3  Duration of Anticoagulation Therapy:  Indefinite  Time of day dose taken:  PM  Product patient has at home:  warfarin 5 mg (peach), 2.5 mg (green)    [id number] Score for Atrial Fibrillation Stroke Risk   Risk   Factors  Component Value   C CHF Yes 1   H HTN Yes 1   A2 Age >= 76 Yes,  (80 y.o.) 2   D DM No 0   S2 Prior Stroke/TIA No 0   V Vascular Disease No 0   A Age 74-69 No,  (80 y.o.) 0   Sc Sex male 0    [id number]  Score  4   Score last updated 6/12/19 2:29 PM    INR Summary                             Warfarin regimen (mg)  Date INR   A/P    Sun Mon Tue Wed Thu Fri Sat Mg/wk  3/5 2.8 At goal, no change  2.5 2.5 2.5 2.5 2.5 5 2.5 20  2/27 2.4 At goal, no change  2.5 2.5 2.5 2.5 2.5 5 2.5 20  2/20 1.9 Below goal, decrease  2.5 2.5 2.5 2.5 2.5 5 2.5 20  2/13 4.6 Above goal, hold x 2  2.5 5 2.5 2.5 0/2.5 0/5 2.5 22.5  2/7 2.7 At goal, no change  2.5 5 2.5 2.5 2.5 5 2.5 22.5  1/30 2.0 At goal, no change  2.5 5 2.5 2.5 2.5 5 2.5 22.5   1/9 3.03 At goal, see plan  12/19 2.1 At goal, no change  2.5 5 2.5 2.5 2.5 5 2.5 22.5  12/4 3.4 Above goal, decrease  2.5 5 2.5 2.5 2.5 5 2.5 22.5  11/7 3.0 At goal, no change  2.5 5 2.5 5 2.5 5 2.5 25  10/17 2.5 At goal, no change  2.5 5 2.5 5 2.5 5 2.5 25  10/3 2.3 At goal, no change  2.5 5 2.5 5 2.5 5 2.5 25   9/17 2.2 Per UC ED  9/5 3.0 At goal, no change  2.5 5 2.5 5 2.5 5 2.5 25   8/23 3.8 Above goal, hold + dec 2.5 5 2.5 5 2.5 0/5 2.5 25  8/2 3.1 Above goal, continue  2.5 5 2.5 5 2.5 5 5 27.5   7/19 3.2 Above goal, decrease x1 2.5 5 2.5 5 2.5 2.5/5 5 27.5   7/3 1.8 Below goal (held doses) 2.5 5 2.5 5 2.5 5 5 27.5  6/12 1.8 Below goal, increase  2.5 5 2.5 5 2.5 5 5 27.5  5/31 2.0 At goal, no change  2.5 5 2.5 5 2.5 5 2.5 25  5/15 1.8 Below goal, increase  2.5 5 2.5 5 2.5 5 2.5 25     5/3 1.8 Below goal, increase  2.5 2.5 2.5 2.5 2.5 5 2.5 20   4/17 1.7 Below goal (held doses) 2.5 2.5 2.5 2.5 2.5 2.5 2.5 17.5  3/29 2.6 At goal, no change  2.5 2.5 2.5 2.5 2.5 2.5 2.5 17.5  3/14 2.7 At goal, no change  2.5 2.5 2.5 2.5 2.5 2.5 2.5 17.5  3/7 2.5 At goal, no change  2.5 2.5 2.5 2.5 2.5 2.5 2.5 17.5  3/2 2.02 On discharge from [facility] 2.5 2.5 2.5 2.5 2.5 2.5 2.5 17.5  2/4 2.4 At goal, no change  5 2.5 5 2.5 5 2.5 5 27.5  1/25 3.1 Above goal, decrease  5 2.5 5 2.5 5 2.5 5 27.5  1/11 2.8 At goal, no change  5 2.5 5 5 2.5 5 5 30  1/2 3.0 At goal, decrease  5 2.5 5 5 2.5 5 5          30          Last CBC:  Lab Results   Component Value Date    RBC 3.65 (L) 01/14/2020    HGB 9.6 (L) 01/14/2020    HCT 28.9 (L) 01/14/2020    MCV 79.2 (L) 01/14/2020    MCH 26.3 01/14/2020    MPV 8.0 01/14/2020    RDW 17.4 (H) 01/14/2020     01/14/2020       Patient History:  Recent hospitalizations/HC visits -1/10-1/14 Admit [facility] for low Hgb and anemia workup: patient underwent EGD + colonoscopy; colonoscopy revealed several polyps and minor diverticulosis; warfarin was held during admission; on discharge, Dr. Ira Issa (GI) recommended holding warfarin for 1 week and resuming it on 1/21   -9/17  ED for weakness, SOB: stopped Cymbalta due to suspected adverse effects; INR=2.2  -8/7  ED for sprained ankle    -7/10 [facility] ED for abdominal pain   -6/27 Epidural injection per Dr[facility] Ian Horne at [facility]  -4/11 Epidural injection at [address] [facility]    Recent medication changes 2/24 start iron for anemia per PCP  12/4 start Lexapro per cardio   Medications taken regularly that may interact with warfarin or alter INR ASA 81 mg, amiodarone 200 mg/d   Warfarin dose taken as prescribed Yes, does not use pillbox, but has own system of taking medications that works very well    Signs/symptoms of bleeding No h/o bleeding reported   Vitamin K intake -Started drinking Ensure (25% vit K) once daily in late September  -Normally has ~0 servings of green, leafy vegetables per week   Recent vomiting/diarrhea/fever, changes in weight or activity level Overall decreased appetite recently   Tobacco or alcohol use Patient quit smoking in 1955  Patient quit drinking alcohol at least 5 years ago   Upcoming surgeries or procedures Third epidural injection possible in future, but nothing scheduled currently     Assessment/Plan:  Patient's INR was drawn by [facility]: [phone]) and was therapeutic today (2.8). Elevated INR on 2/13 was thought to be due to patient not drinking his daily Ensure drinks, which contain vitamin K. Spoke with daughter Andrei Hollins), who denies any warfarin dosing errors, diet changes, antibiotics/steroids, illness, bleeding, etc since last check. He did start iron for anemia, along with docusate PRN constipation, but neither interacts with warfarin. Patient has resumed the Ensure drinks according to his daughter. RN and daughter were instructed to continue warfarin 2.5 mg daily, except 5 mg on Fridays only. Repeat INR in 1 week. Patient was reminded to maintain consistent vitamin K intake and call with any bleeding, medication changes, or fever/vomiting/diarrhea. At clinic visit on 1/9:  POC INR was 4.6, but the CoaguChek meter indicated an error with the test.  According to the , the \"c\" error on the CoaguChek machine may be due to low hematocrit. Therefore, it is recommended to obtain venipuncture INR instead. STAT INR was ordered along with CBC to assess H&H. Although INR was therapeutic (3.03), hemoglobin and hematocrit were both low (8.1g/dL and 24.6%).   Given that patient's baseline hemoglobin is ~11-13g/dL, and the most recent level I could find was 11.4g/dL in September, I contacted patient's PCP (Dr. Lucila Michel) for advice. Due to patient's drop in Hgb, advanced age, and taking warfarin, she recommended that he go to ED for evaluation. Although patient denied any active bleeding in clinic, he did not have a GI physician that he could see urgently for eval.  Patient's daughter Angelina Escobar) was called to recommend that patient go to ED for evaluation. She was informed that significant drop in Hgb could indicate internal bleeding and she was agreeable to seeking emergency medical care. No warfarin dosing instructions were given at that time. Patient was then admitted to [place] [address] and underwent EGD and colonoscopy. The colonoscopy revealed several polyps and minor diverticulosis. Warfarin was held during patient's hospitalization and Dr. Eligio Gong (GI) recommended holding warfarin for 1 week and resuming it on 1/21. Patient understands dosing directions and information discussed. Dosing schedule and follow up appointment given to patient. Progress note routed to referring physician's office. Patient acknowledges working in consult agreement with pharmacist as referred by his/her physician. Next INR Check:  3/12 per [facility]    Please call [place] [address] [facility] at [phone] with any questions. Thanks!   Gerry Huang, PharmD, [facility]  [place] [address] [facility]  [place]: [phone]  [place]: [phone]  3/5/2020 2:11 PM

## 2020-03-06 ENCOUNTER — TELEPHONE (OUTPATIENT)
Dept: PRIMARY CARE CLINIC | Age: 85
End: 2020-03-06

## 2020-03-09 NOTE — TELEPHONE ENCOUNTER
ALICIA message reviewed on 3/6/20. Spoke with both patient's daughters Eb Díaz and Elena Miller. Pt is at [facility] and will go to [facility] for 2 weeks.

## 2020-03-12 ENCOUNTER — TELEPHONE (OUTPATIENT)
Dept: PHARMACY | Age: 85
End: 2020-03-12

## 2020-04-13 ENCOUNTER — TELEPHONE (OUTPATIENT)
Dept: PRIMARY CARE CLINIC | Age: 85
End: 2020-04-13

## 2020-04-22 NOTE — TELEPHONE ENCOUNTER
[facility] ([phone]) and spoke with Calvin Moore, who confirmed that patient remains admitted there on the rehab unit. Evelyne Shultz.  Madi Ruff, PharmD, [facility]  [facility]  Ph: [phone]  4/22/2020 11:27 AM

## 2020-05-12 NOTE — TELEPHONE ENCOUNTER
[employer] called requesting insurance information about patient for home INR monitor. Called patient's daughter to find out updated insurance information for patient. Daughter Liam Parker stated patient passed away on 4/29/20. Made [employer] aware.

## (undated) DEVICE — ERBE NESSY®PLATE 170 SPLIT; 168CM²; CABLE 3M: Brand: ERBE

## (undated) DEVICE — SINGLE-USE POLYPECTOMY SNARE: Brand: CAPTIFLEX

## (undated) DEVICE — TRAP SPEC RETRV CLR PLAS POLYP IN LN SUCT QUIK CTCH

## (undated) DEVICE — FORCEPS BX L240CM JAW DIA2.4MM ORNG L CAP W/ NDL DISP RAD